# Patient Record
Sex: MALE | Race: WHITE | NOT HISPANIC OR LATINO | Employment: OTHER | URBAN - NONMETROPOLITAN AREA
[De-identification: names, ages, dates, MRNs, and addresses within clinical notes are randomized per-mention and may not be internally consistent; named-entity substitution may affect disease eponyms.]

---

## 2017-01-04 DIAGNOSIS — R69 DIAGNOSIS UNKNOWN: Primary | ICD-10-CM

## 2017-01-23 DIAGNOSIS — I10 ESSENTIAL HYPERTENSION WITH GOAL BLOOD PRESSURE LESS THAN 140/90: Primary | ICD-10-CM

## 2017-01-25 RX ORDER — LISINOPRIL AND HYDROCHLOROTHIAZIDE 20; 25 MG/1; MG/1
TABLET ORAL
Qty: 90 TABLET | Refills: 2 | Status: SHIPPED | OUTPATIENT
Start: 2017-01-25 | End: 2017-09-11

## 2017-01-25 NOTE — TELEPHONE ENCOUNTER
LISINOPRIL/HCTZ TABS 20/25MG  lisinopril-hydrochlorothiazide (PRINZIDE/ZESTORETIC) 20-25 MG per tablet  Last Written Prescription Date: 10/21/2016  Last Fill Quantity: 90, # refills: 0  Last Office Visit with [facility], [facility] or [facility] prescribing provider: 09/15/2016       POTASSIUM   Date Value Ref Range Status   09/15/2016 3.7 3.4 - 5.3 mmol/L Final     CREATININE   Date Value Ref Range Status   09/15/2016 0.97 0.66 - 1.25 mg/dL Final     BP Readings from Last 3 Encounters:   09/21/16 110/72   09/15/16 118/72   12/08/15 120/71

## 2017-03-26 DIAGNOSIS — F32.A DEPRESSION: ICD-10-CM

## 2017-03-27 RX ORDER — DULOXETIN HYDROCHLORIDE 60 MG/1
CAPSULE, DELAYED RELEASE ORAL
Qty: 90 CAPSULE | Refills: 0 | Status: SHIPPED | OUTPATIENT
Start: 2017-03-27 | End: 2017-06-01

## 2017-04-27 DIAGNOSIS — F31.11 BIPOLAR I DISORDER, MOST RECENT EPISODE (OR CURRENT) MANIC, MILD (H): ICD-10-CM

## 2017-04-28 RX ORDER — LITHIUM CARBONATE 300 MG/1
CAPSULE ORAL
Qty: 180 CAPSULE | Refills: 0 | Status: SHIPPED | OUTPATIENT
Start: 2017-04-28 | End: 2017-07-03

## 2017-06-01 DIAGNOSIS — F32.A DEPRESSION: ICD-10-CM

## 2017-06-01 DIAGNOSIS — F43.21 ADJUSTMENT DISORDER WITH DEPRESSED MOOD: Primary | ICD-10-CM

## 2017-06-05 RX ORDER — DULOXETIN HYDROCHLORIDE 60 MG/1
CAPSULE, DELAYED RELEASE ORAL
Qty: 90 CAPSULE | Refills: 0 | Status: SHIPPED | OUTPATIENT
Start: 2017-06-05 | End: 2017-09-03

## 2017-06-05 NOTE — TELEPHONE ENCOUNTER
Last visit 9/15/16.  Last PHQ 9/15/16 score of 2.  Due for depression follow up visit.  Cymbalta last signed 3/27/17 #90, 0 R.  Medication pended.  Please advise.  Thanks

## 2017-07-03 DIAGNOSIS — F31.11 BIPOLAR I DISORDER, MOST RECENT EPISODE (OR CURRENT) MANIC, MILD (H): ICD-10-CM

## 2017-07-05 RX ORDER — LITHIUM CARBONATE 300 MG/1
CAPSULE ORAL
Qty: 180 CAPSULE | Refills: 0 | Status: SHIPPED | OUTPATIENT
Start: 2017-07-05 | End: 2017-09-11

## 2017-07-17 ENCOUNTER — TELEPHONE (OUTPATIENT)
Dept: FAMILY MEDICINE | Facility: OTHER | Age: 67
End: 2017-07-17

## 2017-07-17 NOTE — TELEPHONE ENCOUNTER
4:40 PM    Reason for Call: OVERBOOK    Patient is having the following symptoms: persistant cough for 1 months.    The patient is requesting an appointment for sooner than next available with Dr Segovia.    Was an appointment offered for this call? Yes    Preferred method for responding to this message: Telephone Call [phone]    If we cannot reach you directly, may we leave a detailed response at the number you provided? Yes    Can this message wait until your PCP/provider returns, if unavailable today? YES    Priyanka Graham

## 2017-07-19 ENCOUNTER — OFFICE VISIT (OUTPATIENT)
Dept: FAMILY MEDICINE | Facility: OTHER | Age: 67
End: 2017-07-19
Attending: FAMILY MEDICINE
Payer: MEDICARE

## 2017-07-19 VITALS
WEIGHT: 239 LBS | DIASTOLIC BLOOD PRESSURE: 78 MMHG | BODY MASS INDEX: 38.41 KG/M2 | HEART RATE: 64 BPM | HEIGHT: 66 IN | SYSTOLIC BLOOD PRESSURE: 118 MMHG | TEMPERATURE: 97.2 F

## 2017-07-19 DIAGNOSIS — R05.9 COUGH: ICD-10-CM

## 2017-07-19 DIAGNOSIS — F30.9 BIPOLAR I DISORDER, SINGLE MANIC EPISODE (H): Primary | ICD-10-CM

## 2017-07-19 DIAGNOSIS — J98.01 ACUTE BRONCHOSPASM: ICD-10-CM

## 2017-07-19 DIAGNOSIS — R60.9 FLUID RETENTION: ICD-10-CM

## 2017-07-19 DIAGNOSIS — R79.89 ELEVATED LFTS: ICD-10-CM

## 2017-07-19 DIAGNOSIS — R63.5 WEIGHT GAIN: ICD-10-CM

## 2017-07-19 LAB
ALBUMIN SERPL-MCNC: 3.6 G/DL (ref 3.4–5)
ALP SERPL-CCNC: 62 U/L (ref 40–150)
ALT SERPL W P-5'-P-CCNC: 175 U/L (ref 0–70)
ANION GAP SERPL CALCULATED.3IONS-SCNC: 7 MMOL/L (ref 3–14)
AST SERPL W P-5'-P-CCNC: 57 U/L (ref 0–45)
BASOPHILS # BLD AUTO: 0 10E9/L (ref 0–0.2)
BASOPHILS NFR BLD AUTO: 0.6 %
BILIRUB SERPL-MCNC: 0.5 MG/DL (ref 0.2–1.3)
BUN SERPL-MCNC: 23 MG/DL (ref 7–30)
CALCIUM SERPL-MCNC: 9.2 MG/DL (ref 8.5–10.1)
CHLORIDE SERPL-SCNC: 104 MMOL/L (ref 94–109)
CO2 SERPL-SCNC: 29 MMOL/L (ref 20–32)
CREAT SERPL-MCNC: 1.05 MG/DL (ref 0.66–1.25)
DIFFERENTIAL METHOD BLD: NORMAL
EOSINOPHIL # BLD AUTO: 0.4 10E9/L (ref 0–0.7)
EOSINOPHIL NFR BLD AUTO: 5.6 %
ERYTHROCYTE [DISTWIDTH] IN BLOOD BY AUTOMATED COUNT: 13.6 % (ref 10–15)
GFR SERPL CREATININE-BSD FRML MDRD: 70 ML/MIN/1.7M2
GLUCOSE SERPL-MCNC: 109 MG/DL (ref 70–99)
HCT VFR BLD AUTO: 45.8 % (ref 40–53)
HGB BLD-MCNC: 15.3 G/DL (ref 13.3–17.7)
IMM GRANULOCYTES # BLD: 0 10E9/L (ref 0–0.4)
IMM GRANULOCYTES NFR BLD: 0.3 %
LYMPHOCYTES # BLD AUTO: 1.9 10E9/L (ref 0.8–5.3)
LYMPHOCYTES NFR BLD AUTO: 27.5 %
MCH RBC QN AUTO: 31.6 PG (ref 26.5–33)
MCHC RBC AUTO-ENTMCNC: 33.4 G/DL (ref 31.5–36.5)
MCV RBC AUTO: 95 FL (ref 78–100)
MONOCYTES # BLD AUTO: 0.7 10E9/L (ref 0–1.3)
MONOCYTES NFR BLD AUTO: 9.5 %
NEUTROPHILS # BLD AUTO: 3.9 10E9/L (ref 1.6–8.3)
NEUTROPHILS NFR BLD AUTO: 56.5 %
NRBC # BLD AUTO: 0 10*3/UL
NRBC BLD AUTO-RTO: 0 /100
NT-PROBNP SERPL-MCNC: 11 PG/ML (ref 0–125)
PLATELET # BLD AUTO: 242 10E9/L (ref 150–450)
POTASSIUM SERPL-SCNC: 4.4 MMOL/L (ref 3.4–5.3)
PROT SERPL-MCNC: 7.5 G/DL (ref 6.8–8.8)
RBC # BLD AUTO: 4.84 10E12/L (ref 4.4–5.9)
SODIUM SERPL-SCNC: 140 MMOL/L (ref 133–144)
TSH SERPL DL<=0.05 MIU/L-ACNC: 2.58 MU/L (ref 0.4–4)
WBC # BLD AUTO: 6.8 10E9/L (ref 4–11)

## 2017-07-19 PROCEDURE — 99214 OFFICE O/P EST MOD 30 MIN: CPT | Performed by: FAMILY MEDICINE

## 2017-07-19 PROCEDURE — 71020 ZZHC CHEST TWO VIEWS, FRONT/LAT: CPT | Mod: TC

## 2017-07-19 PROCEDURE — 85025 COMPLETE CBC W/AUTO DIFF WBC: CPT | Mod: ZL | Performed by: FAMILY MEDICINE

## 2017-07-19 PROCEDURE — 86704 HEP B CORE ANTIBODY TOTAL: CPT | Mod: ZL | Performed by: FAMILY MEDICINE

## 2017-07-19 PROCEDURE — 83880 ASSAY OF NATRIURETIC PEPTIDE: CPT | Mod: ZL | Performed by: FAMILY MEDICINE

## 2017-07-19 PROCEDURE — 86709 HEPATITIS A IGM ANTIBODY: CPT | Mod: ZL | Performed by: FAMILY MEDICINE

## 2017-07-19 PROCEDURE — 99212 OFFICE O/P EST SF 10 MIN: CPT

## 2017-07-19 PROCEDURE — 80053 COMPREHEN METABOLIC PANEL: CPT | Mod: ZL | Performed by: FAMILY MEDICINE

## 2017-07-19 PROCEDURE — 84443 ASSAY THYROID STIM HORMONE: CPT | Mod: ZL | Performed by: FAMILY MEDICINE

## 2017-07-19 PROCEDURE — G0472 HEP C SCREEN HIGH RISK/OTHER: HCPCS | Mod: ZL | Performed by: FAMILY MEDICINE

## 2017-07-19 PROCEDURE — 36415 COLL VENOUS BLD VENIPUNCTURE: CPT | Mod: ZL | Performed by: FAMILY MEDICINE

## 2017-07-19 RX ORDER — METHYLPREDNISOLONE 4 MG
TABLET, DOSE PACK ORAL
Qty: 21 TABLET | Refills: 0 | Status: SHIPPED | OUTPATIENT
Start: 2017-07-19 | End: 2017-09-11

## 2017-07-19 ASSESSMENT — PAIN SCALES - GENERAL: PAINLEVEL: NO PAIN (0)

## 2017-07-19 ASSESSMENT — ANXIETY QUESTIONNAIRES
3. WORRYING TOO MUCH ABOUT DIFFERENT THINGS: NOT AT ALL
1. FEELING NERVOUS, ANXIOUS, OR ON EDGE: NOT AT ALL
5. BEING SO RESTLESS THAT IT IS HARD TO SIT STILL: NOT AT ALL
GAD7 TOTAL SCORE: 0
7. FEELING AFRAID AS IF SOMETHING AWFUL MIGHT HAPPEN: NOT AT ALL
IF YOU CHECKED OFF ANY PROBLEMS ON THIS QUESTIONNAIRE, HOW DIFFICULT HAVE THESE PROBLEMS MADE IT FOR YOU TO DO YOUR WORK, TAKE CARE OF THINGS AT HOME, OR GET ALONG WITH OTHER PEOPLE: NOT DIFFICULT AT ALL
2. NOT BEING ABLE TO STOP OR CONTROL WORRYING: NOT AT ALL
6. BECOMING EASILY ANNOYED OR IRRITABLE: NOT AT ALL
4. TROUBLE RELAXING: NOT AT ALL

## 2017-07-19 NOTE — PATIENT INSTRUCTIONS
Will call with labs and US results    Call me in 2 weeks if cough not resolved    Watch diet - need to loose wt.

## 2017-07-19 NOTE — MR AVS SNAPSHOT
After Visit Summary   7/19/2017    Umer Archuleta    MRN: [id number]           Patient Information     Date Of Birth          1950        Visit Information        Provider Department      7/19/2017 8:45 AM Glenn Segovia MD [facility] [place]        Today's Diagnoses     Bipolar I disorder, single manic episode (H)    -  1    Weight gain        Fluid retention        Cough        Acute bronchospasm         Elevated LFTs          Care Instructions    Will call with labs and US results    Call me in 2 weeks if cough not resolved    Watch diet - need to loose wt.            Follow-ups after your visit        Your next 10 appointments already scheduled     Sep 11, 2017 10:00 AM CDT   (Arrive by 9:45 AM)   PHYSICAL with Glenn Segovia MD   [facility] [place] ([facility] )    [address]  [place] [address]   [phone]              Who to contact     If you have questions or need follow up information about today's clinic visit or your schedule please contact [facility] directly at [phone].  Normal or non-critical lab and imaging results will be communicated to you by RealDhart, letter or phone within 4 business days after the clinic has received the results. If you do not hear from us within 7 days, please contact the clinic through Yappt or phone. If you have a critical or abnormal lab result, we will notify you by phone as soon as possible.  Submit refill requests through LinkCloud or call your pharmacy and they will forward the refill request to us. Please allow 3 business days for your refill to be completed.          Additional Information About Your Visit        RealDharAppSurfer Information     LinkCloud gives you secure access to your electronic health record. If you see a primary care provider, you can also send messages to your care team and make appointments. If you have questions, please call your primary care clinic.  If you do not have a  "primary care provider, please call [phone] and they will assist you.        Care EveryWhere ID     This is your Care EveryWhere ID. This could be used by other organizations to access your [place] medical records  [id number]        Your Vitals Were     Pulse Temperature Height BMI (Body Mass Index)          64 97.2  F (36.2  C) 5' 5.5\" (1.664 m) 39.17 kg/m2         Blood Pressure from Last 3 Encounters:   07/19/17 118/78   09/21/16 110/72   09/15/16 118/72    Weight from Last 3 Encounters:   07/19/17 239 lb (108.4 kg)   09/21/16 228 lb 3.2 oz (103.5 kg)   09/15/16 224 lb (101.6 kg)              We Performed the Following     CBC with platelets differential     Comprehensive metabolic panel     Hepatitis A antibody IgM     Hepatitis B core antibody     Hepatitis C antibody     N terminal pro BNP outpatient     TSH     US Abdomen Limited     XR Chest 2 Views          Today's Medication Changes          These changes are accurate as of: 7/19/17 10:25 AM.  If you have any questions, ask your nurse or doctor.               Start taking these medicines.        Dose/Directions    methylPREDNISolone 4 MG tablet   Commonly known as:  MEDROL DOSEPAK   Used for:  Cough   Started by:  Glenn Segovia MD        Follow package instructions   Quantity:  21 tablet   Refills:  0            Where to get your medicines      These medications were sent to [facility] [place] - [address]  [address] [place] [address]     Phone:  [phone]     methylPREDNISolone 4 MG tablet                Primary Care Provider Office Phone # Fax #    Glenn Segovia [phone]       [facility] [address] [place] [address]        Equal Access to Services     [facility] : Hadii olvin Norton, waaxda luqadaha, qaybta kaalmada kenneth, russel may. So [facility] [phone].    ATENCIÓN: Si habla español, tiene a avelar disposición servicios " shauna de asistencia lingüística. Moira gleason [phone].    We comply with applicable federal civil rights laws and [place] laws. We do not discriminate on the basis of race, color, national origin, age, disability sex, sexual orientation or gender identity.            Thank you!     Thank you for choosing [facility]  for your care. Our goal is always to provide you with excellent care. Hearing back from our patients is one way we can continue to improve our services. Please take a few minutes to complete the written survey that you may receive in the mail after your visit with us. Thank you!             Your Updated Medication List - Protect others around you: Learn how to safely use, store and throw away your medicines at www.disposemymeds.org.          This list is accurate as of: 7/19/17 10:25 AM.  Always use your most recent med list.                   Brand Name Dispense Instructions for use Diagnosis    aspirin EC 81 MG EC tablet      Take 81 mg by mouth daily        DULoxetine 60 MG EC capsule    CYMBALTA    90 capsule    TAKE 1 CAPSULE DAILY    Adjustment disorder with depressed mood       lisinopril-hydrochlorothiazide 20-25 MG per tablet    PRINZIDE/ZESTORETIC    90 tablet    TAKE 1 TABLET DAILY    Essential hypertension with goal blood pressure less than 140/90       lithium 300 MG capsule     180 capsule    TAKE 2 CAPSULES (600 MG) DAILY (NEED FOLLOW UP)    Bipolar I disorder, most recent episode (or current) manic, mild (H)       methylPREDNISolone 4 MG tablet    MEDROL DOSEPAK    21 tablet    Follow package instructions    Cough       omeprazole 20 MG CR capsule    priLOSEC    90 capsule    TAKE 1 CAPSULE DAILY BEFORE A MEAL    Diagnosis unknown       sildenafil 100 MG cap/tab    VIAGRA    18 tablet    Take 1 tablet (100 mg) by mouth daily as needed for erectile dysfunction    Erectile dysfunction       vitamin D3 5000 UNIT/ML Liqd      Take 1 drop by mouth    Bipolar I disorder,  single manic episode, unspecified, Depression, major, recurrent (H)

## 2017-07-19 NOTE — NURSING NOTE
"Chief Complaint   Patient presents with     Chronic Cough       Initial /78  Pulse 64  Temp 97.2  F (36.2  C)  Ht 5' 5.5\" (1.664 m)  Wt 239 lb (108.4 kg)  BMI 39.17 kg/m2 Estimated body mass index is 39.17 kg/(m^2) as calculated from the following:    Height as of this encounter: 5' 5.5\" (1.664 m).    Weight as of this encounter: 239 lb (108.4 kg).  Medication Reconciliation: complete     Estuardo Graham      "

## 2017-07-19 NOTE — PROGRESS NOTES
"  SUBJECTIVE:                                                    Umer Archuleta is a 66 year old male who presents to clinic today for the following health issues:      cough      Duration: chronic for years, but worse in last few weeks    Description (location/character/radiation): deep loud cough    Intensity:  moderate    Accompanying signs and symptoms: none    History (similar episodes/previous evaluation): None    Precipitating or alleviating factors: None    Therapies tried and outcome: zyrtec    Harsh cough and s/s of bronchospasms    See VIJI              Depression and Anxiety Follow-Up    Status since last visit: No change    Other associated symptoms:None    Complicating factors:     Significant life event: No     Current substance abuse: None    PHQ-9 SCORE 12/8/2015 9/15/2016 7/19/2017   Total Score - - -   Total Score 18 2 4     KACIE-7 SCORE 7/19/2017   Total Score 0         No flowsheet data found.    PHQ-9  English  PHQ-9   Any Language  GAD7        Problem list and histories reviewed & adjusted, as indicated.  Additional history: as documented        Reviewed and updated as needed this visit by clinical staffTobacco  Allergies  Meds  Med Hx  Surg Hx  Fam Hx  Soc Hx      Reviewed and updated as needed this visit by Provider         ROS:  C: NEGATIVE for fever, chills, change in weight  E/M: NEGATIVE for ear, mouth and throat problems  CV: NEGATIVE for chest pain, palpitations    C/o some wt gain /fluid retention at times  No new meds or change in meds  Increase appetite and poor eating habits  No new allergens  Taking antihist.     Rare etoh use  No tylenol use  No h/o high lipids       OBJECTIVE:                                                    /78  Pulse 64  Temp 97.2  F (36.2  C)  Ht 5' 5.5\" (1.664 m)  Wt 239 lb (108.4 kg)  BMI 39.17 kg/m2  Body mass index is 39.17 kg/(m^2).   GENERAL: healthy, alert, well nourished, well hydrated, no distress- harsh dry cough  HENT: ear " canals- normal; TMs- normal; Nose- normal; Mouth- no ulcers, no lesions  NECK: no tenderness, no adenopathy, no asymmetry, no masses, no stiffness; thyroid- normal to palpation  RESP: lungs clear to auscultation - no rales, no rhonchi, no wheezes  CV: regular rates and rhythm, normal S1 S2, no S3 or S4 and no murmur, no click or rub -  Psych- doing well. noraml affect but some eating habit issues       Results for orders placed or performed in visit on 07/19/17 (from the past 24 hour(s))   CBC with platelets differential   Result Value Ref Range    WBC 6.8 4.0 - 11.0 10e9/L    RBC Count 4.84 4.4 - 5.9 10e12/L    Hemoglobin 15.3 13.3 - 17.7 g/dL    Hematocrit 45.8 40.0 - 53.0 %    MCV 95 78 - 100 fl    MCH 31.6 26.5 - 33.0 pg    MCHC 33.4 31.5 - 36.5 g/dL    RDW 13.6 10.0 - 15.0 %    Platelet Count 242 150 - 450 10e9/L    Diff Method Automated Method     % Neutrophils 56.5 %    % Lymphocytes 27.5 %    % Monocytes 9.5 %    % Eosinophils 5.6 %    % Basophils 0.6 %    % Immature Granulocytes 0.3 %    Nucleated RBCs 0 0 /100    Absolute Neutrophil 3.9 1.6 - 8.3 10e9/L    Absolute Lymphocytes 1.9 0.8 - 5.3 10e9/L    Absolute Monocytes 0.7 0.0 - 1.3 10e9/L    Absolute Eosinophils 0.4 0.0 - 0.7 10e9/L    Absolute Basophils 0.0 0.0 - 0.2 10e9/L    Abs Immature Granulocytes 0.0 0 - 0.4 10e9/L    Absolute Nucleated RBC 0.0    Comprehensive metabolic panel   Result Value Ref Range    Sodium 140 133 - 144 mmol/L    Potassium 4.4 3.4 - 5.3 mmol/L    Chloride 104 94 - 109 mmol/L    Carbon Dioxide 29 20 - 32 mmol/L    Anion Gap 7 3 - 14 mmol/L    Glucose 109 (H) 70 - 99 mg/dL    Urea Nitrogen 23 7 - 30 mg/dL    Creatinine 1.05 0.66 - 1.25 mg/dL    GFR Estimate 70 >60 mL/min/1.7m2    GFR Estimate If Black 85 >60 mL/min/1.7m2    Calcium 9.2 8.5 - 10.1 mg/dL    Bilirubin Total 0.5 0.2 - 1.3 mg/dL    Albumin 3.6 3.4 - 5.0 g/dL    Protein Total 7.5 6.8 - 8.8 g/dL    Alkaline Phosphatase 62 40 - 150 U/L     (H) 0 - 70 U/L    AST  57 (H) 0 - 45 U/L   TSH   Result Value Ref Range    TSH 2.58 0.40 - 4.00 mU/L     CXR ok/stable   ASSESSMENT/PLAN:                                                    (F30.9) Bipolar I disorder, single manic episode (H)  (primary encounter diagnosis)  Comment: overall stable - some heavier eating and night eating. Will need to watch.   Plan: CBC with platelets differential, Comprehensive         metabolic panel, TSH        Continue current medications and behavioral changes.     (R63.5) Weight gain  Comment: Discussed. May be reason for elevated LFT- has h/o fatty liver  Plan: CBC with platelets differential, Comprehensive         metabolic panel, TSH, XR Chest 2 Views, N         terminal pro BNP outpatient        Discussed behavioral changes to improve obesity including increase diet, decreasing carbs along with other changes in diet and consider seeing dietician or enroll in a watch watcher's type program.       (E87.70) Fluid retention  Comment: salt and fluid intake related- discussed. Labs ok - few labs pending  Plan: CBC with platelets differential, Comprehensive         metabolic panel, TSH, XR Chest 2 Views, N         terminal pro BNP outpatient            (R05) Cough  Comment: etiology unclear.  ??post viral Not GERD  ? Fluid retention related - doubt.  ACEI - doubt but possible   Plan: N terminal pro BNP outpatient,         methylPREDNISolone (MEDROL DOSEPAK) 4 MG tablet        Try steroids pack and if fails try advair type inhaler vs trial of off ACEI    (J98.01) Acute bronchospasm   Comment: as above BNP also pending   Plan: N terminal pro BNP outpatient        US done due to this and below. ?? diaphragm irritation     (R79.89) Elevated LFTs  Comment: No ETOH- probable fatty liver - discussed.   Check below labs and US .   Plan: Hepatitis C antibody, Hepatitis B core         antibody, Hepatitis A antibody IgM, US Abdomen         Limited            Will see for Px in sept.        See Patient  Instructions    Glenn Segovia MD  [facility]

## 2017-07-20 LAB
HAV IGM SERPL QL IA: NORMAL
HBV CORE AB SERPL QL IA: NONREACTIVE
HCV AB SERPL QL IA: NORMAL

## 2017-07-20 ASSESSMENT — PATIENT HEALTH QUESTIONNAIRE - PHQ9: SUM OF ALL RESPONSES TO PHQ QUESTIONS 1-9: 4

## 2017-07-20 ASSESSMENT — ANXIETY QUESTIONNAIRES: GAD7 TOTAL SCORE: 0

## 2017-07-24 ENCOUNTER — HOSPITAL ENCOUNTER (OUTPATIENT)
Dept: ULTRASOUND IMAGING | Facility: HOSPITAL | Age: 67
Discharge: HOME | End: 2017-07-24
Attending: FAMILY MEDICINE | Admitting: FAMILY MEDICINE
Payer: MEDICARE

## 2017-07-24 PROCEDURE — 76705 ECHO EXAM OF ABDOMEN: CPT | Mod: TC

## 2017-07-27 ENCOUNTER — TELEPHONE (OUTPATIENT)
Dept: FAMILY MEDICINE | Facility: OTHER | Age: 67
End: 2017-07-27

## 2017-07-27 NOTE — TELEPHONE ENCOUNTER
8:44 AM    Reason for Call: Phone Call    Description: PT called to receive the results from an Ultra sound performed on Monday 07/24/2017.  Nurse please advise.    Was an appointment offered for this call? No    Preferred method for responding to this message: Telephone Call: [phone]    If we cannot reach you directly, may we leave a detailed response at the number you provided? Yes    Can this message wait until your PCP/provider returns, if available today? Not applicable, PCP is in today     Lorraine Bach

## 2017-09-03 DIAGNOSIS — F43.21 ADJUSTMENT DISORDER WITH DEPRESSED MOOD: ICD-10-CM

## 2017-09-05 NOTE — TELEPHONE ENCOUNTER
Cymbalta    Last Written Prescription Date: 6/5/17  Last Fill Quantity: 90, # refills: 0  Last Office Visit with [facility], [facility] or  Health prescribing provider: 7/19/17   Next 5 appointments (look out 90 days)     Sep 11, 2017 10:00 AM CDT   (Arrive by 9:45 AM)   PHYSICAL with Glenn Segovia MD   [facility] [place] ([facility] )    [address] [place]  [place] [address]   [phone]                   BP Readings from Last 3 Encounters:   07/19/17 118/78   09/21/16 110/72   09/15/16 118/72     Pulse: (for Fetzima)  Creatinine   Date Value Ref Range Status   07/19/2017 1.05 0.66 - 1.25 mg/dL Final   ]    Last PHQ-9 score on record=   PHQ-9 SCORE 7/19/2017   Total Score -   Total Score 4

## 2017-09-06 RX ORDER — DULOXETIN HYDROCHLORIDE 60 MG/1
CAPSULE, DELAYED RELEASE ORAL
Qty: 90 CAPSULE | Refills: 3 | Status: SHIPPED | OUTPATIENT
Start: 2017-09-06 | End: 2018-09-02

## 2017-09-06 NOTE — TELEPHONE ENCOUNTER
Cymbalta  7/19/17 labs high:  AST 57    Last office note stated see patient in Sept.  Pharmacy requesting 3 month supply.  Medication pended.  Please advise.  Thank you.

## 2017-09-11 ENCOUNTER — OFFICE VISIT (OUTPATIENT)
Dept: FAMILY MEDICINE | Facility: OTHER | Age: 67
End: 2017-09-11
Attending: FAMILY MEDICINE
Payer: MEDICARE

## 2017-09-11 VITALS
BODY MASS INDEX: 35.84 KG/M2 | OXYGEN SATURATION: 94 % | HEIGHT: 69 IN | SYSTOLIC BLOOD PRESSURE: 108 MMHG | WEIGHT: 242 LBS | TEMPERATURE: 97.2 F | HEART RATE: 67 BPM | DIASTOLIC BLOOD PRESSURE: 72 MMHG

## 2017-09-11 DIAGNOSIS — F31.11 BIPOLAR I DISORDER, MOST RECENT EPISODE (OR CURRENT) MANIC, MILD (H): ICD-10-CM

## 2017-09-11 DIAGNOSIS — F33.42 RECURRENT MAJOR DEPRESSIVE DISORDER, IN FULL REMISSION (H): ICD-10-CM

## 2017-09-11 DIAGNOSIS — F30.9 BIPOLAR I DISORDER, SINGLE MANIC EPISODE (H): ICD-10-CM

## 2017-09-11 DIAGNOSIS — I10 ESSENTIAL HYPERTENSION WITH GOAL BLOOD PRESSURE LESS THAN 140/90: ICD-10-CM

## 2017-09-11 DIAGNOSIS — Z00.00 ROUTINE GENERAL MEDICAL EXAMINATION AT A HEALTH CARE FACILITY: Primary | ICD-10-CM

## 2017-09-11 DIAGNOSIS — E66.01 MORBID OBESITY DUE TO EXCESS CALORIES (H): ICD-10-CM

## 2017-09-11 DIAGNOSIS — R35.1 NOCTURIA: ICD-10-CM

## 2017-09-11 DIAGNOSIS — R63.5 WEIGHT GAIN: ICD-10-CM

## 2017-09-11 LAB
LITHIUM SERPL-SCNC: 0.5 MMOL/L (ref 0.6–1.2)
PSA SERPL-MCNC: 0.64 UG/L (ref 0–4)

## 2017-09-11 PROCEDURE — 99213 OFFICE O/P EST LOW 20 MIN: CPT

## 2017-09-11 PROCEDURE — 80178 ASSAY OF LITHIUM: CPT | Mod: ZL | Performed by: FAMILY MEDICINE

## 2017-09-11 PROCEDURE — 84153 ASSAY OF PSA TOTAL: CPT | Mod: ZL | Performed by: FAMILY MEDICINE

## 2017-09-11 PROCEDURE — 99397 PER PM REEVAL EST PAT 65+ YR: CPT | Performed by: FAMILY MEDICINE

## 2017-09-11 PROCEDURE — 36415 COLL VENOUS BLD VENIPUNCTURE: CPT | Mod: ZL | Performed by: FAMILY MEDICINE

## 2017-09-11 RX ORDER — LISINOPRIL AND HYDROCHLOROTHIAZIDE 20; 25 MG/1; MG/1
1 TABLET ORAL DAILY
Qty: 90 TABLET | Refills: 3 | Status: SHIPPED | OUTPATIENT
Start: 2017-09-11 | End: 2018-09-09

## 2017-09-11 RX ORDER — LITHIUM CARBONATE 300 MG/1
CAPSULE ORAL
Qty: 180 CAPSULE | Refills: 3 | Status: SHIPPED | OUTPATIENT
Start: 2017-09-11 | End: 2018-09-24

## 2017-09-11 ASSESSMENT — ANXIETY QUESTIONNAIRES
1. FEELING NERVOUS, ANXIOUS, OR ON EDGE: NOT AT ALL
GAD7 TOTAL SCORE: 0
3. WORRYING TOO MUCH ABOUT DIFFERENT THINGS: NOT AT ALL
5. BEING SO RESTLESS THAT IT IS HARD TO SIT STILL: NOT AT ALL
6. BECOMING EASILY ANNOYED OR IRRITABLE: NOT AT ALL
7. FEELING AFRAID AS IF SOMETHING AWFUL MIGHT HAPPEN: NOT AT ALL
4. TROUBLE RELAXING: NOT AT ALL
2. NOT BEING ABLE TO STOP OR CONTROL WORRYING: NOT AT ALL

## 2017-09-11 ASSESSMENT — PATIENT HEALTH QUESTIONNAIRE - PHQ9: SUM OF ALL RESPONSES TO PHQ QUESTIONS 1-9: 5

## 2017-09-11 ASSESSMENT — PAIN SCALES - GENERAL: PAINLEVEL: NO PAIN (0)

## 2017-09-11 NOTE — PROGRESS NOTES
SUBJECTIVE:   CC: Umer Archuleta is an 67 year old male who presents for preventative health visit.     Healthy Habits:    Do you get at least three servings of calcium containing foods daily (dairy, green leafy vegetables, etc.)? No    Amount of exercise or daily activities, outside of work: 5-6 day(s) per week - walk dogs, but not very far    Problems taking medications regularly No    Medication side effects: No    Have you had an eye exam in the past two years? yes    Do you see a dentist twice per year? no    Do you have sleep apnea, excessive snoring or daytime drowsiness?Sleep apnea - mask at night          Hypertension Follow-up      Outpatient blood pressures are not being checked.    Low Salt Diet: no added salt    Depression Followup    Status since last visit: Stable  On meds     See PHQ-9 for current symptoms.  Other associated symptoms: None    Complicating factors:   Significant life event:  No   Current substance abuse:  None  Anxiety or Panic symptoms:  No      Wt is still going up and eating poorly and up part of the night eating and on computer.       Not sleep well 5/7 nights - eats and on ipad.  On CPAP  No pain or anything waking him      Lots on the ipad during day  Not much exercise    No change in meds       PHQ-9  English  PHQ-9   Any Language          Today's PHQ-2 Score:   PHQ-2 ( 1999 [employer]) 8/14/2013 3/21/2013   Q1: Little interest or pleasure in doing things 0 1   Q2: Feeling down, depressed or hopeless 0 0   PHQ-2 Score 0 1     PHQ-9 SCORE 9/15/2016 7/19/2017 9/11/2017   Total Score - - -   Total Score 2 4 5     KACIE-7 SCORE 7/19/2017 9/11/2017   Total Score 0 0         Abuse: Current or Past(Physical, Sexual or Emotional)- No  Do you feel safe in your environment - Yes    Social History   Substance Use Topics     Smoking status: Former Smoker     Packs/day: 0.50     Years: 10.00     Types: Cigarettes     Smokeless tobacco: Never Used      Comment: Tried to Quit (YES)     Alcohol  "use 0.0 oz/week      Comment: rarely     The patient does not drink >3 drinks per day nor >7 drinks per week.    Last PSA:   PSA   Date Value Ref Range Status   09/15/2016 0.86 0 - 4 ug/L Final     Comment:     Assay Method:  Chemiluminescence using Siemens Vista analyzer       Reviewed orders with patient. Reviewed health maintenance and updated orders accordingly - Yes      Reviewed and updated as needed this visit by clinical staff  Tobacco  Allergies  Meds  Problems  Med Hx  Surg Hx  Fam Hx  Soc Hx          Reviewed and updated as needed this visit by Provider        Past Medical History:   Diagnosis Date     Bipolar II Disorder 10/23/2012     Depression, major, recurrent (H)      Depression, major, recurrent (H)      ED (erectile dysfunction)      Fine Tremor 10/23/2012     H/o manic episode 10/23/2012     Hypertension 10/23/2012     Sleep disorder 11/23/2012     Tobacco abuse, in remission       Past Surgical History:   Procedure Laterality Date     COLONOSCOPY  8/28/2013    repeat in 2023//Procedure: COLONOSCOPY;  COLONOSCOPY;  Surgeon: Agustin Llamas MD;  Location: [facility]     TONSILLECTOMY         ROS:  C: NEGATIVE for fever, chills, change in weight  I: NEGATIVE for worrisome rashes, moles or lesions  E: NEGATIVE for vision changes or irritation  ENT: NEGATIVE for ear, mouth and throat problems  R: NEGATIVE for significant cough or SOB  CV: NEGATIVE for chest pain, palpitations or peripheral edema  GI: NEGATIVE for nausea, abdominal pain, heartburn, or change in bowel habits   male: negative for dysuria, hematuria, decreased urinary stream, erectile dysfunction, urethral discharge  M: NEGATIVE for significant arthralgias or myalgia  N: NEGATIVE for weakness, dizziness or paresthesias  P: NEGATIVE for changes in mood or affect    OBJECTIVE:   /72 (BP Location: Left arm, Patient Position: Chair, Cuff Size: Adult Regular)  Pulse 67  Temp 97.2  F (36.2  C) (Tympanic)  Ht 5' 8.5\" (1.74 m)  " Wt 242 lb (109.8 kg)  SpO2 94%  BMI 36.26 kg/m2  EXAM:  GENERAL: healthy, alert and no distress  EYES: Eyes grossly normal to inspection, PERRL and conjunctivae and sclerae normal  HENT: ear canals and TM's normal, nose and mouth without ulcers or lesions  NECK: no adenopathy, no asymmetry, masses, or scars and thyroid normal to palpation  RESP: lungs clear to auscultation - no rales, rhonchi or wheezes  CV: regular rate and rhythm, normal S1 S2, no S3 or S4, no murmur, click or rub, no peripheral edema and peripheral pulses strong  ABDOMEN: soft, nontender, no hepatosplenomegaly, no masses and bowel sounds normal small sliding umbilical hernia    (male): normal male genitalia without lesions or urethral discharge,  Small right hernia - very small   MS: no gross musculoskeletal defects noted, no edema  SKIN: no suspicious lesions or rashes  NEURO: Normal strength and tone, mentation intact and speech normal  PSYCH: mentation appears normal, affect normal/bright  Results for orders placed or performed in visit on 09/11/17 (from the past 24 hour(s))   PSA tumor marker   Result Value Ref Range    PSA 0.64 0 - 4 ug/L       ASSESSMENT/PLAN:   1. Routine general medical examination at a health care facility  Doing fair -needs to be more active and needs better habits-- long discussion on this     2. Bipolar I disorder, single manic episode (H)  Stable   - Lithium level; Future  - Lithium level    3. Recurrent major depressive disorder, in full remission (H)  Stable   - Lithium level; Future  - Lithium level    4. Weight gain  Long discussion on all bad habits.  Pt is to work on this     5. Essential hypertension with goal blood pressure less than 140/90  Stable on meds   - lisinopril-hydrochlorothiazide (PRINZIDE/ZESTORETIC) 20-25 MG per tablet; Take 1 tablet by mouth daily  Dispense: 90 tablet; Refill: 3    6. Nocturia  Mild - will watch   - PSA tumor marker; Future  - PSA tumor marker    7. Morbid obesity due to  "excess calories (H)  Long discussion     8. Bipolar I disorder, most recent episode (or current) manic, mild (H)  As above - no s/s of recurrence but does have some different thinking at times.   - lithium 300 MG capsule; TAKE 2 CAPSULES (600 MG) DAILY (NEED FOLLOW UP)  Dispense: 180 capsule; Refill: 3    COUNSELING:  Reviewed preventive health counseling, as reflected in patient instructions       Regular exercise       Healthy diet/nutrition       Prostate cancer screening           reports that he has quit smoking. His smoking use included Cigarettes. He has a 5.00 pack-year smoking history. He has never used smokeless tobacco.      Estimated body mass index is 36.26 kg/(m^2) as calculated from the following:    Height as of this encounter: 5' 8.5\" (1.74 m).    Weight as of this encounter: 242 lb (109.8 kg).   Weight management plan: Patient was referred to their PCP to discuss a diet and exercise plan.    Counseling Resources:  ATP IV Guidelines  Pooled Cohorts Equation Calculator  FRAX Risk Assessment  ICSI Preventive Guidelines  Dietary Guidelines for Americans, 2010  USDA's MyPlate  ASA Prophylaxis  Lung CA Screening    Glenn Segovia MD  [facility] [place]  "

## 2017-09-11 NOTE — NURSING NOTE
"Chief Complaint   Patient presents with     Physical       Initial /72 (BP Location: Left arm, Patient Position: Chair, Cuff Size: Adult Regular)  Pulse 67  Temp 97.2  F (36.2  C) (Tympanic)  Ht 5' 8.5\" (1.74 m)  Wt 242 lb (109.8 kg)  SpO2 94%  BMI 36.26 kg/m2 Estimated body mass index is 36.26 kg/(m^2) as calculated from the following:    Height as of this encounter: 5' 8.5\" (1.74 m).    Weight as of this encounter: 242 lb (109.8 kg).  Medication Reconciliation: complete     Maribell Vail   "

## 2017-09-11 NOTE — MR AVS SNAPSHOT
After Visit Summary   9/11/2017    Umer Archuleta    MRN: [id number]           Patient Information     Date Of Birth          1950        Visit Information        Provider Department      9/11/2017 10:00 AM Glenn Segovia MD [facility]        Today's Diagnoses     Routine general medical examination at a health care facility    -  1    Bipolar I disorder, single manic episode (H)        Recurrent major depressive disorder, in full remission (H)        Weight gain        Essential hypertension with goal blood pressure less than 140/90        Nocturia        Morbid obesity due to excess calories (H)        Bipolar I disorder, most recent episode (or current) manic, mild (H)          Care Instructions      Preventive Health Recommendations:   Male Ages 65 and over    Yearly exam:             See your health care provider every year in order to  o   Review health changes.   o   Discuss preventive care.    o   Review your medicines if your doctor has prescribed any.    Talk with your health care provider about whether you should have a test to screen for prostate cancer (PSA).    Every 3 years, have a diabetes test (fasting glucose). If you are at risk for diabetes, you should have this test more often.    Every 5 years, have a cholesterol test. Have this test more often if you are at risk for high cholesterol or heart disease.     Every 10 years, have a colonoscopy. Or, have a yearly FIT test (stool test). These exams will check for colon cancer.    Talk to with your health care provider about screening for Abdominal Aortic Aneurysm if you have a family history of AAA or have a history of smoking.    Shots:     Get a flu shot each year.     Get a tetanus shot every 10 years.     Talk to your doctor about your pneumonia vaccines. There are now two you should receive - Pneumovax (PPSV 23) and Prevnar (PCV 13).     Talk to your doctor about a shingles vaccine.     Talk to your doctor  about the hepatitis B vaccine.  Nutrition:     Eat at least 5 servings of fruits and vegetables each day.     Eat whole-grain bread, whole-wheat pasta and brown rice instead of white grains and rice.     Talk to your provider about Calcium and Vitamin D.   Lifestyle    Exercise for at least 150 minutes a week (30 minutes a day, 5 days a week). This will help you control your weight and prevent disease.     Limit alcohol to one drink per day.     No smoking.     Wear sunscreen to prevent skin cancer.     See your dentist every six months for an exam and cleaning.   See your eye doctor every 1 to 2 years to screen for conditions such as glaucoma, macular degeneration, cataracts, etc   Your Heart is at Risk    Plaque and blood clots in the coronary arteries reduce blood flow to the heart:  When a coronary artery narrows, less blood and oxygen flow to the heart muscle. The decreased blood flow can cause symptoms of angina. This is often felt as temporary pain or pressure in or near the chest. This can also feel like pain in the jaw, neck, and shoulder.  When a coronary artery narrows too much, very little blood and oxygen reach the heart muscle beyond the site of narrowing. If a clot forms, blood flow in the artery may stop. This can result in a heart attack. If the muscle goes without oxygen for too long, that part of the heart muscle dies.  Your whole body is at risk  Plaque buildup can lead to problems throughout the body. Common sites of artery problems include:  Brain. Arteries in the brain or leading to the brain can become blocked. When this happens, blood flow to that part of the brain is reduced and that part of the brain can t get the oxygen it needs. That portion of the brain is damaged. This is a stroke.  Kidneys. If an artery that carries blood to the kidneys is narrowed, the kidneys have a hard time filtering blood. This can lead to kidney damage.  Aorta. This is the body s main artery. It connects  directly to the heart. If this artery is damaged, the affected section can weaken and balloon out. This is called an aortic aneurysm.  Legs. If arteries in the legs are clogged with plaque, cramping, or aching in the buttocks, thighs, or calves can occur when walking. This is called claudication.  Date Last Reviewed: 6/1/2016 2000-2017 [employer]. [address] [place] [address], [place] [address]. All rights reserved. This information is not intended as a substitute for professional medical care. Always follow your healthcare professional's instructions.        Weight Management: Take It Off and Keep It Off    It s easy to be motivated when you first start. The key is to stay motivated all along the way and to have realistic and achievable goals. There are things you can do to keep yourself on the path to success.  Don t focus on daily weight gains and losses. Instead, weigh yourself no more than once a week at the same time of day. Weighing yourself each day will probably only frustrate you.    Stay motivated  Here are suggestions to keep you motivated:   Remind yourself of your goals. Post them near the refrigerator or desk where you work.  Make daily entries in your diary or journal about your activity and eating. A visual reminder of success, like a gold star, can help keep you going.  Every week, take time to look back on how much you ve accomplished, and the changes you may have made.  Try taking a nutrition class. It can help you learn new shopping, cooking, and eating skills, and also meet new people. You might try a low-fat cooking or yoga class.  Don t be hard on yourself or give up if you slip. Be patient. Learn from your mistakes and adjust your plan if you need to. Then get right back to it.  Be realistic about your goals. Talk with a dietitian or your healthcare provider about what goals are reasonable for you.   Believe that you can do it  How you think about yourself is just as important as  what you do. If you don t think you can succeed, chances are you won t. Believe that you can stick to your plan and meet your goals:  If you don t meet a goal, don t use it as an excuse to give up on your whole plan. Adjust your goal and try again.  Try to understand your own attitude about food.  Are you subject to emotional eating?  Learn how to accept compliments. Even if you get embarrassed, just say  thank you.   Make a list of the things that others like about you and that you like about yourself. Add something new from time to time. Keep this list to look at when you need a lift.  Resources  The President s [place] on Fitness, Sports & Nutritionwww.fitness.gov  Academy of Nutrition and Dieteticswww.eatright.org  Healthfinderwww.healthfinder.gov  Date Last Reviewed: 2/4/2016 2000-2017 [employer]. [address] [place] [address]. All rights reserved. This information is not intended as a substitute for professional medical care. Always follow your healthcare professional's instructions.        Weight Management: Overcoming Your Barriers    You may have many reasons why you re not ready to lose weight. You may not feel you have the time or the skills. You may be afraid of losing weight and gaining it back again. Well, you can lose weight. And you can keep the weight off, if you make changes slowly and stick with them. Remember that you may never find the perfect time to lose weight. Decide that the right time to be healthier is now.  Common barriers  Barrier 1: I don t want to deny myself.  Barrier Buster: You don t have to! Moderation is the key:  Watch portion sizes and know when you're eating more than one serving.  Plan to ask for a doggy bag when you eat out.  Have just one.  Choose lower-fat and lower-calorie versions of your favorites.  Use a small plate instead of a normal-sized plate.   Barrier 2: I lost weight before but I gained it right back.  Barrier Buster: Make this  time different:  List what worked and didn t work last time and what you can try this time.  Choose changes that you are willing to stick with.  Work exercise into your weight-loss plan.  Be realistic about what is possible. Your plan has to fit into your life in a balanced way that works for you.   Barrier 3: I don t have the time to be active.  Barrier Buster: It takes just a few minutes a day!  Be active with a pet or the kids.  Block off activity time in your schedule.  Borrow some time that you usually spend watching TV.  You are too important not to take time to exercise--it is your life!   Feel good about yourself  Do you eat more because you feel bad about yourself, then feel even worse as you gain weight? This is a  vicious cycle.  Breaking this cycle is not easy. You may need group support or counseling. Always remember that you are a valuable person, no matter what size or shape you are.  Do you have a health problem? If so, don t use it as an excuse for not losing weight. Ask your healthcare provider or dietitian about methods to lose weight that are safe for you. For example, even if you have severe arthritis, it may be easier for you to exercise in a pool. Get advice from a .    Date Last Reviewed: 2/2/2016 2000-2017 [employer]. [address] [place] [address], [place] [address]. All rights reserved. This information is not intended as a substitute for professional medical care. Always follow your healthcare professional's instructions.        Weight Management: Healthy Eating  Food is your body s fuel. You can t live without it. The key is to give your body enough nutrients and energy without eating too much. Reading food labels can help you make healthy choices. Also, learn new eating habits to manage your weight.     All the values on the label are based on one serving. The serving size is the average portion. Remember to multiply the values on the label by the number of  servings you eat.   Eat less fat  A gram of fat has almost 2.5 times the calories of a gram of protein or carbohydrates. Try to balance your food choices so that only 20% to 35% of your calories comes from total fat. This means an average of 2  to 3  grams of fat for each 100 calories you eat.  Eat more fiber  High-fiber foods are digested more slowly than low-fiber foods, so you feel full longer. Try to get at least 25 grams of fiber each day for a 2000 calorie diet. Foods high in fiber include:  Vegetables and fruits  Whole-grain or bran breads, pastas, and cereals  Legumes (beans) and peas  As you begin to eat more fiber, be sure to drink plenty of water to keep your digestive system working smoothly.  Tips  Do's and don'ts include:   Don t skip meals. This often leads to overeating later on. It s best to spread your eating throughout the day.  Eat a variety of foods, not just a few favorites.  If you find yourself eating when you re not hungry, ask yourself why. Many of us eat when we re bored, stressed, or just to be polite. Listen to your body. If you re not hungry, get busy doing something else instead of eating.  Eat slower, shooting for 20 to 30 minutes for each meal. It takes 20 minutes for your stomach to tell your brain that it s full. Slow eaters tend to eat less and are still satisfied, while fast eaters may tend to be overeaters.   Pay attention to what you eat. Don t read or watch TV during your meal.  Date Last Reviewed: 1/31/2016 2000-2017 [employer]. [address] [place] [address], [place] [address]. All rights reserved. This information is not intended as a substitute for professional medical care. Always follow your healthcare professional's instructions.        Weight Management: Fact and Fiction    Knowing the truth about losing weight can help you separate what works from what doesn t. Don t be taken in by expensive weight-loss fads like pills, herbs, and special foods that promise  unbelievable results. There s no magic way to lose weight. If you have questions about weight loss, ask your healthcare provider.  Fiction:  The faster I lose weight, the better.   Fact: Rapid weight loss is usually due to loss of water or muscle mass. What you re trying to get rid of is extra fat. Aim to lose a 1/2 pound to 2 pounds a week. Then you re more likely to lose fat rather than water or muscle.  Fiction:  Skipping meals will help me lose weight.   Fact: When you skip meals, you don t give your body the energy it needs to work. Hunger makes you more likely to overeat later on. It s best to spread your meals throughout the day. Eat at least three meals a day.  Fiction:  I can t start exercising until I lose weight.   Fact: The sooner you start exercising the better. Exercise helps burn more calories, tone your muscles, and keep your appetite in check. People who continue to exercise after they lose weight are more likely to keep the weight off.  Fiction:  The fewer calories I eat, the better.   Fact: This seems like it should be true, but it s not. When you eat too few calories, your body acts as if it s on a desert island. It thinks food is scarce, so it slows down your metabolism (how fast you burn calories) to save energy. By eating too few calories, you make it harder to lose weight.  Fiction:  Once I lose weight, I can go back to living the way I did before.   Fact: Going back to your old eating habits and giving up exercise is a sure way to regain any weight you ve lost. The lifestyle changes that help you lose extra weight can also help keep it off. This is why you need to make realistic changes you can stick with.  Fiction:  Low-fat and fat-free mean low-calorie.   Fact: All foods, even fat-free ones, have calories. Eat too many calories and you ll gain weight. It s OK to treat yourself to a fat-free cookie or two. Just don t eat the whole box! A dietitian will help you figure this out, and will  "likely recommend that you eat three meals a day, with protein with each meal.   Date Last Reviewed: 2/4/2016 2000-2017 [employer]. [address] [place] [address], [place] [address]. All rights reserved. This information is not intended as a substitute for professional medical care. Always follow your healthcare professional's instructions.                  Follow-ups after your visit        Who to contact     If you have questions or need follow up information about today's clinic visit or your schedule please contact [facility] directly at [phone].  Normal or non-critical lab and imaging results will be communicated to you by I and love and youhart, letter or phone within 4 business days after the clinic has received the results. If you do not hear from us within 7 days, please contact the clinic through [employer] or phone. If you have a critical or abnormal lab result, we will notify you by phone as soon as possible.  Submit refill requests through [employer] or call your pharmacy and they will forward the refill request to us. Please allow 3 business days for your refill to be completed.          Additional Information About Your Visit        MyChart Information     [employer] gives you secure access to your electronic health record. If you see a primary care provider, you can also send messages to your care team and make appointments. If you have questions, please call your primary care clinic.  If you do not have a primary care provider, please call [phone] and they will assist you.        Care EveryWhere ID     This is your Care EveryWhere ID. This could be used by other organizations to access your [place] medical records  [id number]        Your Vitals Were     Pulse Temperature Height Pulse Oximetry BMI (Body Mass Index)       67 97.2  F (36.2  C) (Tympanic) 5' 8.5\" (1.74 m) 94% 36.26 kg/m2        Blood Pressure from Last 3 Encounters:   09/11/17 108/72   07/19/17 118/78   09/21/16 110/72    " Weight from Last 3 Encounters:   09/11/17 242 lb (109.8 kg)   07/19/17 239 lb (108.4 kg)   09/21/16 228 lb 3.2 oz (103.5 kg)              We Performed the Following     Lithium level     OFFICE/OUTPT VISIT,EST,LEVL III     PSA tumor marker          Today's Medication Changes          These changes are accurate as of: 9/11/17 12:26 PM.  If you have any questions, ask your nurse or doctor.               These medicines have changed or have updated prescriptions.        Dose/Directions    lisinopril-hydrochlorothiazide 20-25 MG per tablet   Commonly known as:  PRINZIDE/ZESTORETIC   This may have changed:  See the new instructions.   Used for:  Essential hypertension with goal blood pressure less than 140/90   Changed by:  Glenn Segovia MD        Dose:  1 tablet   Take 1 tablet by mouth daily   Quantity:  90 tablet   Refills:  3       lithium 300 MG capsule   This may have changed:  See the new instructions.   Used for:  Bipolar I disorder, most recent episode (or current) manic, mild (H)   Changed by:  Glenn Segovia MD        TAKE 2 CAPSULES (600 MG) DAILY (NEED FOLLOW UP)   Quantity:  180 capsule   Refills:  3         Stop taking these medicines if you haven't already. Please contact your care team if you have questions.     methylPREDNISolone 4 MG tablet   Commonly known as:  MEDROL DOSEPAK   Stopped by:  Glenn Segovia MD                Where to get your medicines      These medications were sent to [facility] - [address] [address]     Phone:  [phone]     lisinopril-hydrochlorothiazide 20-25 MG per tablet    lithium 300 MG capsule                Primary Care Provider Office Phone # Fax #    Glenn Segovia [phone]       [facility] [address] [facility] [address]        Equal Access to Services     FAY HOLMAN AH: nahomy Acevedo, ty cooper,  russel bloodbarry ceja'aan ah. So [facility] [phone].    ATENCIÓN: Si jamella bessy, tiene a avelar disposición servicios gratuitos de asistencia lingüística. Moira gleason [phone].    We comply with applicable federal civil rights laws and [place] laws. We do not discriminate on the basis of race, color, national origin, age, disability sex, sexual orientation or gender identity.            Thank you!     Thank you for choosing [facility]3] [facility]  [facility] your [facility]. Our goal is always to provide you with excellent care. Hearing back from our patients is one way we can continue to improve our services. Please take a few minutes to complete the written survey that you may receive in the mail after your visit with us. Thank you!             Your Updated Medication List - Protect others around you: Learn how to safely use, store and throw away your medicines at www.disposemymeds.org.          This list is accurate as of: 9/11/17 12:26 PM.  Always use your most recent med list.                   Brand Name Dispense Instructions for use Diagnosis    aspirin EC 81 MG EC tablet      Take 81 mg by mouth daily        DULoxetine 60 MG EC capsule    CYMBALTA    90 capsule    TAKE 1 CAPSULE DAILY    Adjustment disorder with depressed mood       lisinopril-hydrochlorothiazide 20-25 MG per tablet    PRINZIDE/ZESTORETIC    90 tablet    Take 1 tablet by mouth daily    Essential hypertension with goal blood pressure less than 140/90       lithium 300 MG capsule     180 capsule    TAKE 2 CAPSULES (600 MG) DAILY (NEED FOLLOW UP)    Bipolar I disorder, most recent episode (or current) manic, mild (H)       omeprazole 20 MG CR capsule    priLOSEC    90 capsule    TAKE 1 CAPSULE DAILY BEFORE A MEAL    Diagnosis unknown       sildenafil 100 MG cap/tab    VIAGRA    18 tablet    Take 1 tablet (100 mg) by mouth daily as needed for erectile dysfunction    Erectile dysfunction       vitamin D3 5000 UNIT/ML Liqd      Take 1 drop by  mouth    Bipolar I disorder, single manic episode, unspecified, Depression, major, recurrent (H)

## 2017-09-12 ASSESSMENT — ANXIETY QUESTIONNAIRES: GAD7 TOTAL SCORE: 0

## 2017-09-15 DIAGNOSIS — R69 DIAGNOSIS UNKNOWN: ICD-10-CM

## 2017-09-17 NOTE — PATIENT INSTRUCTIONS
Preventive Health Recommendations:   Male Ages 65 and over    Yearly exam:             See your health care provider every year in order to  o   Review health changes.   o   Discuss preventive care.    o   Review your medicines if your doctor has prescribed any.    Talk with your health care provider about whether you should have a test to screen for prostate cancer (PSA).    Every 3 years, have a diabetes test (fasting glucose). If you are at risk for diabetes, you should have this test more often.    Every 5 years, have a cholesterol test. Have this test more often if you are at risk for high cholesterol or heart disease.     Every 10 years, have a colonoscopy. Or, have a yearly FIT test (stool test). These exams will check for colon cancer.    Talk to with your health care provider about screening for Abdominal Aortic Aneurysm if you have a family history of AAA or have a history of smoking.    Shots:     Get a flu shot each year.     Get a tetanus shot every 10 years.     Talk to your doctor about your pneumonia vaccines. There are now two you should receive - Pneumovax (PPSV 23) and Prevnar (PCV 13).     Talk to your doctor about a shingles vaccine.     Talk to your doctor about the hepatitis B vaccine.  Nutrition:     Eat at least 5 servings of fruits and vegetables each day.     Eat whole-grain bread, whole-wheat pasta and brown rice instead of white grains and rice.     Talk to your provider about Calcium and Vitamin D.   Lifestyle    Exercise for at least 150 minutes a week (30 minutes a day, 5 days a week). This will help you control your weight and prevent disease.     Limit alcohol to one drink per day.     No smoking.     Wear sunscreen to prevent skin cancer.     See your dentist every six months for an exam and cleaning.   See your eye doctor every 1 to 2 years to screen for conditions such as glaucoma, macular degeneration, cataracts, etc   Your Heart is at Risk    Plaque and blood clots in the  coronary arteries reduce blood flow to the heart:  When a coronary artery narrows, less blood and oxygen flow to the heart muscle. The decreased blood flow can cause symptoms of angina. This is often felt as temporary pain or pressure in or near the chest. This can also feel like pain in the jaw, neck, and shoulder.  When a coronary artery narrows too much, very little blood and oxygen reach the heart muscle beyond the site of narrowing. If a clot forms, blood flow in the artery may stop. This can result in a heart attack. If the muscle goes without oxygen for too long, that part of the heart muscle dies.  Your whole body is at risk  Plaque buildup can lead to problems throughout the body. Common sites of artery problems include:  Brain. Arteries in the brain or leading to the brain can become blocked. When this happens, blood flow to that part of the brain is reduced and that part of the brain can t get the oxygen it needs. That portion of the brain is damaged. This is a stroke.  Kidneys. If an artery that carries blood to the kidneys is narrowed, the kidneys have a hard time filtering blood. This can lead to kidney damage.  Aorta. This is the body s main artery. It connects directly to the heart. If this artery is damaged, the affected section can weaken and balloon out. This is called an aortic aneurysm.  Legs. If arteries in the legs are clogged with plaque, cramping, or aching in the buttocks, thighs, or calves can occur when walking. This is called claudication.  Date Last Reviewed: 6/1/2016 2000-2017 [employer]. [address] [place] [address], [place] [address]. All rights reserved. This information is not intended as a substitute for professional medical care. Always follow your healthcare professional's instructions.        Weight Management: Take It Off and Keep It Off    It s easy to be motivated when you first start. The key is to stay motivated all along the way and to have realistic and  achievable goals. There are things you can do to keep yourself on the path to success.  Don t focus on daily weight gains and losses. Instead, weigh yourself no more than once a week at the same time of day. Weighing yourself each day will probably only frustrate you.    Stay motivated  Here are suggestions to keep you motivated:   Remind yourself of your goals. Post them near the refrigerator or desk where you work.  Make daily entries in your diary or journal about your activity and eating. A visual reminder of success, like a gold star, can help keep you going.  Every week, take time to look back on how much you ve accomplished, and the changes you may have made.  Try taking a nutrition class. It can help you learn new shopping, cooking, and eating skills, and also meet new people. You might try a low-fat cooking or yoga class.  Don t be hard on yourself or give up if you slip. Be patient. Learn from your mistakes and adjust your plan if you need to. Then get right back to it.  Be realistic about your goals. Talk with a dietitian or your healthcare provider about what goals are reasonable for you.   Believe that you can do it  How you think about yourself is just as important as what you do. If you don t think you can succeed, chances are you won t. Believe that you can stick to your plan and meet your goals:  If you don t meet a goal, don t use it as an excuse to give up on your whole plan. Adjust your goal and try again.  Try to understand your own attitude about food.  Are you subject to emotional eating?  Learn how to accept compliments. Even if you get embarrassed, just say  thank you.   Make a list of the things that others like about you and that you like about yourself. Add something new from time to time. Keep this list to look at when you need a lift.  Resources  The [employer] s [place] on Fitness, Sports & Nutritionwww.fitness.gov  Academy of Nutrition and  Dieteticswww.eatright.org  Healthfinderwww.healthfinder.gov  Date Last Reviewed: 2/4/2016 2000-2017 [employer]. [address] [place] [address], [place] [address]. All rights reserved. This information is not intended as a substitute for professional medical care. Always follow your healthcare professional's instructions.        Weight Management: Overcoming Your Barriers    You may have many reasons why you re not ready to lose weight. You may not feel you have the time or the skills. You may be afraid of losing weight and gaining it back again. Well, you can lose weight. And you can keep the weight off, if you make changes slowly and stick with them. Remember that you may never find the perfect time to lose weight. Decide that the right time to be healthier is now.  Common barriers  Barrier 1: I don t want to deny myself.  Barrier Buster: You don t have to! Moderation is the key:  Watch portion sizes and know when you're eating more than one serving.  Plan to ask for a doggy bag when you eat out.  Have just one.  Choose lower-fat and lower-calorie versions of your favorites.  Use a small plate instead of a normal-sized plate.   Barrier 2: I lost weight before but I gained it right back.  Barrier Buster: Make this time different:  List what worked and didn t work last time and what you can try this time.  Choose changes that you are willing to stick with.  Work exercise into your weight-loss plan.  Be realistic about what is possible. Your plan has to fit into your life in a balanced way that works for you.   Barrier 3: I don t have the time to be active.  Barrier Buster: It takes just a few minutes a day!  Be active with a pet or the kids.  Block off activity time in your schedule.  Borrow some time that you usually spend watching TV.  You are too important not to take time to exercise--it is your life!   Feel good about yourself  Do you eat more because you feel bad about yourself, then feel even worse  as you gain weight? This is a  vicious cycle.  Breaking this cycle is not easy. You may need group support or counseling. Always remember that you are a valuable person, no matter what size or shape you are.  Do you have a health problem? If so, don t use it as an excuse for not losing weight. Ask your healthcare provider or dietitian about methods to lose weight that are safe for you. For example, even if you have severe arthritis, it may be easier for you to exercise in a pool. Get advice from a .    Date Last Reviewed: 2/2/2016 2000-2017 [employer]. [address] [place] [address], [place] [address]. All rights reserved. This information is not intended as a substitute for professional medical care. Always follow your healthcare professional's instructions.        Weight Management: Healthy Eating  Food is your body s fuel. You can t live without it. The key is to give your body enough nutrients and energy without eating too much. Reading food labels can help you make healthy choices. Also, learn new eating habits to manage your weight.     All the values on the label are based on one serving. The serving size is the average portion. Remember to multiply the values on the label by the number of servings you eat.   Eat less fat  A gram of fat has almost 2.5 times the calories of a gram of protein or carbohydrates. Try to balance your food choices so that only 20% to 35% of your calories comes from total fat. This means an average of 2  to 3  grams of fat for each 100 calories you eat.  Eat more fiber  High-fiber foods are digested more slowly than low-fiber foods, so you feel full longer. Try to get at least 25 grams of fiber each day for a 2000 calorie diet. Foods high in fiber include:  Vegetables and fruits  Whole-grain or bran breads, pastas, and cereals  Legumes (beans) and peas  As you begin to eat more fiber, be sure to drink plenty of water to keep your digestive system working  smoothly.  Tips  Do's and don'ts include:   Don t skip meals. This often leads to overeating later on. It s best to spread your eating throughout the day.  Eat a variety of foods, not just a few favorites.  If you find yourself eating when you re not hungry, ask yourself why. Many of us eat when we re bored, stressed, or just to be polite. Listen to your body. If you re not hungry, get busy doing something else instead of eating.  Eat slower, shooting for 20 to 30 minutes for each meal. It takes 20 minutes for your stomach to tell your brain that it s full. Slow eaters tend to eat less and are still satisfied, while fast eaters may tend to be overeaters.   Pay attention to what you eat. Don t read or watch TV during your meal.  Date Last Reviewed: 1/31/2016 2000-2017 [employer]. [address] [place] [address]. All rights reserved. This information is not intended as a substitute for professional medical care. Always follow your healthcare professional's instructions.        Weight Management: Fact and Fiction    Knowing the truth about losing weight can help you separate what works from what doesn t. Don t be taken in by expensive weight-loss fads like pills, herbs, and special foods that promise unbelievable results. There s no magic way to lose weight. If you have questions about weight loss, ask your healthcare provider.  Fiction:  The faster I lose weight, the better.   Fact: Rapid weight loss is usually due to loss of water or muscle mass. What you re trying to get rid of is extra fat. Aim to lose a 1/2 pound to 2 pounds a week. Then you re more likely to lose fat rather than water or muscle.  Fiction:  Skipping meals will help me lose weight.   Fact: When you skip meals, you don t give your body the energy it needs to work. Hunger makes you more likely to overeat later on. It s best to spread your meals throughout the day. Eat at least three meals a day.  Fiction:  I can t start  exercising until I lose weight.   Fact: The sooner you start exercising the better. Exercise helps burn more calories, tone your muscles, and keep your appetite in check. People who continue to exercise after they lose weight are more likely to keep the weight off.  Fiction:  The fewer calories I eat, the better.   Fact: This seems like it should be true, but it s not. When you eat too few calories, your body acts as if it s on a desert island. It thinks food is scarce, so it slows down your metabolism (how fast you burn calories) to save energy. By eating too few calories, you make it harder to lose weight.  Fiction:  Once I lose weight, I can go back to living the way I did before.   Fact: Going back to your old eating habits and giving up exercise is a sure way to regain any weight you ve lost. The lifestyle changes that help you lose extra weight can also help keep it off. This is why you need to make realistic changes you can stick with.  Fiction:  Low-fat and fat-free mean low-calorie.   Fact: All foods, even fat-free ones, have calories. Eat too many calories and you ll gain weight. It s OK to treat yourself to a fat-free cookie or two. Just don t eat the whole box! A dietitian will help you figure this out, and will likely recommend that you eat three meals a day, with protein with each meal.   Date Last Reviewed: 2/4/2016 2000-2017 [employer]. [address] [place] [address], [place] [address]. All rights reserved. This information is not intended as a substitute for professional medical care. Always follow your healthcare professional's instructions.           1.94

## 2017-10-08 ENCOUNTER — HISTORY (OUTPATIENT)
Dept: EMERGENCY MEDICINE | Facility: OTHER | Age: 67
End: 2017-10-08

## 2018-03-12 ENCOUNTER — DOCUMENTATION ONLY (OUTPATIENT)
Dept: FAMILY MEDICINE | Facility: OTHER | Age: 68
End: 2018-03-12

## 2018-03-12 RX ORDER — DULOXETIN HYDROCHLORIDE 30 MG/1
30 CAPSULE, DELAYED RELEASE ORAL DAILY
COMMUNITY
End: 2018-11-26

## 2018-09-09 DIAGNOSIS — I10 ESSENTIAL HYPERTENSION WITH GOAL BLOOD PRESSURE LESS THAN 140/90: ICD-10-CM

## 2018-09-10 RX ORDER — LISINOPRIL AND HYDROCHLOROTHIAZIDE 20; 25 MG/1; MG/1
TABLET ORAL
Qty: 90 TABLET | Refills: 0 | Status: SHIPPED | OUTPATIENT
Start: 2018-09-10 | End: 2018-11-26

## 2018-09-24 DIAGNOSIS — R69 DIAGNOSIS UNKNOWN: ICD-10-CM

## 2018-09-24 DIAGNOSIS — F31.11 BIPOLAR I DISORDER, MOST RECENT EPISODE (OR CURRENT) MANIC, MILD (H): ICD-10-CM

## 2018-09-24 NOTE — LETTER
September 26, 2018      Umer Archuleta  [address] [facility] [address]        Dear Umer,     APPOINTMENT REMINDER:   Our records indicates that it is time for you to be seen for your yearly follow-up: blood pressure check, medication review, and lab work.      Your current medication request will be approved for one refill but you will need to be seen before any additional refills can be approved. Taking care of your health is important to us, and ongoing visits with your provider are vital to your care.    We look forward to seeing you in the near future.  You may call our office at [phone] to schedule a visit.     Please disregard this notice if you have already made an appointment.        Sincerely,  Glenn Segovia MD

## 2018-09-26 RX ORDER — LITHIUM CARBONATE 300 MG/1
CAPSULE ORAL
Qty: 60 CAPSULE | Refills: 0 | Status: SHIPPED | OUTPATIENT
Start: 2018-09-26 | End: 2018-11-26

## 2018-09-26 NOTE — TELEPHONE ENCOUNTER
omeprazole      Last Written Prescription Date:  9/15/17  Last Fill Quantity: 90,   # refills: 3  Last Office Visit: 9/11/17  Future Office visit:       Routing refill request to provider for review/approval because:  pcp out will route to Dr. Ramirez to advise, due for office visit - letter sent to pt today    Lithium 300 mg      Last Written Prescription Date:  9/11/17  Last Fill Quantity: 180,   # refills: 3  Last Office Visit: 9/11/17  Future Office visit:

## 2018-11-19 NOTE — PROGRESS NOTES
"  SUBJECTIVE:   Umer Archuleta is a 68 year old male who presents for Preventive Visit.  {PVP to remind patient that this is not necessarily a physical exam; physical exam may or may not be done:398261::\"click delete button to remove this line now\"}  {PVP to inform patient that additional E&M charge may apply, if additional problems addressed:326924::\"click delete button to remove this line now\"}  Are you in the first 12 months of your Medicare Part B coverage?  {No Yes:753314::\"No\"}    Physical Health:    In general, how would you rate your overall physical health? { :725641}    Outside of work, how many days during the week do you exercise? { :053676}    Outside of work, approximately how many minutes a day do you exercise?{ :089404}    If you drink alcohol do you typically have >3 drinks per day or >7 drinks per week? {ETOH :430286}    Do you usually eat at least 4 servings of fruit and vegetables a day, include whole grains & fiber and avoid regularly eating high fat or \"junk\" foods? { :001190::\"Yes\"}    Do you have any problems taking medications regularly?  { :918129::\"No\"}    Do you have any side effects from medications? { :026379}    Needs assistance for the following daily activities: { :298196}    Which of the following safety concerns are present in your home?:  { :944583::\"none identified\"}     Hearing impairment: {NO/YES:584387}    In the past 6 months, have you been bothered by leaking of urine? {YESNOBLANK:917941}    Mental Health:    In general, how would you rate your overall mental or emotional health? { :291990}  PHQ-2 Score:      Additional concerns to address?  {If YES, notify patient they may be responsible for a copay, coinsurance or deductible if the visit today includes services such as checking on a sore throat, having an x-ray or lab test, or treating and evaluating a new or existing condition :942774::\"No\"}    Fall risk:      {Document Fall Risk in the Assessments Section of the " "Navigator:349040}    {If any of the above assessments are answered yes, consider ordering appropriate referrals (Optional):979361::\"click delete button to remove this line now\"}    {AWV Cognitive Screenin}    {Outside tests to abstract? :263304}    Hypertension Follow-up      Outpatient blood pressures {ISCHECKIN}    Low Salt Diet: { :387658::\"no added salt\"}    {Depression and Anxiety Followup:930508}    Reviewed and updated as needed this visit by clinical staff         Reviewed and updated as needed this visit by Provider        Social History   Substance Use Topics     Smoking status: Former Smoker     Smokeless tobacco: Never Used     Alcohol use Not on file                             Do you feel safe in your environment - {YES/NO/NA:110760}    Do you have a Health Care Directive?: {HEALTHCARE DIRECTIVE STATUS:016204}    Current providers sharing in care for this patient include:   Patient Care Team:  Glenn Segovia MD as PCP - General    The following health maintenance items are reviewed in Epic and correct as of today:  Health Maintenance   Topic Date Due     DEPRESSION ACTION PLAN Q1 YR  1968     PERMANENT COMMENTS  04/15/2013     AORTIC ANEURYSM SCREENING (SYSTEM ASSIGNED)  2015     PHQ-9 Q6 MONTHS  2018     PNEUMOCOCCAL (2 of 2 - PPSV23) 2018     INFLUENZA VACCINE (1) 2018     FALL RISK ASSESSMENT  2018     LIPID SCREEN Q5 YR MALE (SYSTEM ASSIGNED)  09/15/2021     ADVANCE DIRECTIVE PLANNING Q5 YRS  2021     TETANUS IMMUNIZATION (SYSTEM ASSIGNED)  2023     COLON CANCER SCREEN (SYSTEM ASSIGNED)  2023     HEPATITIS C SCREENING  Completed     {Chronicprobdata (Optional):210033}    {Decision Support (Optional):073229}    ROS:  {ROS COMP:993775}    OBJECTIVE:   There were no vitals taken for this visit. Estimated body mass index is 36.26 kg/(m^2) as calculated from the following:    Height as of 17: 5' 8.5\" (1.74 m).    Weight as of " "17: 242 lb (109.8 kg).  EXAM:   {Exam :501083}    {Diagnostic Test Results (Optional):378281::\"Diagnostic Test Results:\",\"none \"}    ASSESSMENT / PLAN:   {Diag Picklist:382924}    End of Life Planning:  Patient currently has an advanced directive: { :300265}    COUNSELING:  {Medicare Counselin}    BP Readings from Last 1 Encounters:   17 108/72     Estimated body mass index is 36.26 kg/(m^2) as calculated from the following:    Height as of 17: 5' 8.5\" (1.74 m).    Weight as of 17: 242 lb (109.8 kg).    {BP Counseling- Complete if BP >= 120/80  (Optional):045889}  {Weight Management Plan (ACO) Complete if BMI is abnormal-  Ages 18-64  BMI >24.9.  Age 65+ with BMI <23 or >30 (Optional):401330}     reports that he has quit smoking. He has never used smokeless tobacco.  {Tobacco Cessation -- Complete if patient is a smoker (Optional):470504}    Appropriate preventive services were discussed with this patient, including applicable screening as appropriate for cardiovascular disease, diabetes, osteopenia/osteoporosis, and glaucoma.  As appropriate for age/gender, discussed screening for colorectal cancer, prostate cancer, breast cancer, and cervical cancer. Checklist reviewing preventive services available has been given to the patient.    Reviewed patients plan of care and provided an AVS. The {CarePlan:483635} for Umer meets the Care Plan requirement. This Care Plan has been established and reviewed with the {PATIENT, FAMILY MEMBER, CAREGIVER:989862}.    Counseling Resources:  ATP IV Guidelines  Pooled Cohorts Equation Calculator  Breast Cancer Risk Calculator  FRAX Risk Assessment  ICSI Preventive Guidelines  Dietary Guidelines for Americans, 2010  USDA's MyPlate  ASA Prophylaxis  Lung CA Screening    Glenn Segovia MD  [facility] [place]  "

## 2018-11-19 NOTE — PATIENT INSTRUCTIONS
Preventive Health Recommendations:     See your health care provider every year to    Review health changes.     Discuss preventive care.      Review your medicines if your doctor has prescribed any.      Talk with your health care provider about whether you should have a test to screen for prostate cancer (PSA).    Every 3 years, have a diabetes test (fasting glucose). If you are at risk for diabetes, you should have this test more often.    Every 5 years, have a cholesterol test. Have this test more often if you are at risk for high cholesterol or heart disease.     Every 10 years, have a colonoscopy. Or, have a yearly FIT test (stool test). These exams will check for colon cancer.    Talk to with your health care provider about screening for Abdominal Aortic Aneurysm if you have a family history of AAA or have a history of smoking.    Shots:     Get a flu shot each year.     Get a tetanus shot every 10 years.     Talk to your doctor about your pneumonia vaccines. There are now two you should receive - Pneumovax (PPSV 23) and Prevnar (PCV 13).     Talk to your pharmacist about a shingles vaccine.     Talk to your doctor about the hepatitis B vaccine.  Nutrition:     Eat at least 5 servings of fruits and vegetables each day.     Eat whole-grain bread, whole-wheat pasta and brown rice instead of white grains and rice.     Get adequate Calcium and Vitamin D.   Lifestyle    Exercise for at least 150 minutes a week (30 minutes a day, 5 days a week). This will help you control your weight and prevent disease.     Limit alcohol to one drink per day.     No smoking.     Wear sunscreen to prevent skin cancer.    See your dentist every six months for an exam and cleaning.    See your eye doctor every 1 to 2 years to screen for conditions such as glaucoma, macular degeneration, cataracts, etc.    Personalized Prevention Plan  You are due for the preventive services outlined below.  Your care team is available to assist you  in scheduling these services.  If you have already completed any of these items, please share that information with your care team to update in your medical record.  Health Maintenance Due   Topic Date Due     Depression Action Plan Review - yearly  08/25/1968     PERMANENT COMMENTS  04/15/2013     AORTIC ANEURYSM SCREENING (SYSTEM ASSIGNED)  08/25/2015     Depression Assessment - every 6 months  03/11/2018     Pneumococcal Vaccine (2 of 2 - PPSV23) 08/14/2018     Flu Vaccine (1) 09/01/2018     FALL RISK ASSESSMENT  09/11/2018           Ref Range & Units 10:17 AM   1yr ago   2yr ago        TSH 0.40 - 4.00 mU/L 2.29 2.58 2.79     Resulting Agency  [facility] [place]          Specimen Collected: 11/26/18 10:17 AM Last Resulted: 11/26/18 10:48 AM                                 Lipid Profile (Chol, Trig, HDL, LDL calc)   Status:  Final result   Visible to patient:  No (Not Released) Dx:  Medicare annual wellness visit, initi... Order: [id number]             Ref Range & Units 10:17 AM   2yr ago   5yr ago        Cholesterol <200 mg/dL 162 174 168R, CM      Triglycerides <150 mg/dL 103 93CM 72R, CM     Comments: Fasting specimen      HDL Cholesterol >39 mg/dL 44 46 37 (L)R      LDL Cholesterol Calculated <100 mg/dL 97 109 (H)CM 117R, CM     Comments: Desirable:       <100 mg/dl      Non HDL Cholesterol <130 mg/dL 118 128      Resulting Agency  [facility] [place]          Specimen Collected: 11/26/18 10:17 AM Last Resulted: 11/26/18 10:48 AM                CM=Additional comments  R=Reference range differs from displayed range                     Comprehensive metabolic panel   Status:  Final result   Visible to patient:  No (Not Released) Dx:  Medicare annual wellness visit, initi... Order: [id number]             Ref Range & Units 10:17 AM   1yr ago   2yr ago        Sodium 133 - 144 mmol/L 142 140 139      Potassium 3.4 - 5.3 mmol/L 4.0 4.4 3.7      Chloride 94 - 109 mmol/L 109 104 103      Carbon Dioxide 20 - 32  mmol/L 30 29 31      Anion Gap 3 - 14 mmol/L 3 7 5      Glucose 70 - 99 mg/dL 106 (H) 109 (H) (H)     Comments: Fasting specimen      Urea Nitrogen 7 - 30 mg/dL 23 23 15      Creatinine 0.66 - 1.25 mg/dL 1.02 1.05 0.97      GFR Estimate >60 mL/min/1.7m2 73 70CM 77CM     Comments: Non  GFR Calc      GFR Estimate If Black >60 mL/min/1.7m2 88 85CM >90   GFR ...     Comments:  GFR Calc      Calcium 8.5 - 10.1 mg/dL 9.2 9.2 8.7      Bilirubin Total 0.2 - 1.3 mg/dL 0.3 0.5 0.6      Albumin 3.4 - 5.0 g/dL 3.9 3.6 3.7      Protein Total 6.8 - 8.8 g/dL 7.6 7.5 7.6      Alkaline Phosphatase 40 - 150 U/L 58 62 65      ALT 0 - 70 U/L 100 (H) 175 (H) 80 (H)      AST 0 - 45 U/L 31 57 (H) 32     Resulting Agency  [facility] [place]          Specimen Collected: 11/26/18 10:17 AM Last Resulted: 11/26/18 10:48 AM                CM=Additional comments                      PSA Diagnostic   Status:  Final result   Visible to patient:  No (Not Released) Dx:  Medicare annual wellness visit, initi... Order: [id number]             Ref Range & Units 10:17 AM   1yr ago   2yr ago        PSA 0 - 4 ug/L 0.77 0.64CM 0.86CM     Comments: Assay Method:  Chemiluminescence using Siemens Vista analyzer     Resulting [facility] [place]          Specimen Collected: 11/26/18 10:17 AM Last Resulted: 11/26/18 10:48 AM                CM=Additional comments                      CBC with platelets and differential   Status:  Final result   Visible to patient:  No (Not Released) Dx:  Medicare annual wellness visit, initi... Order: [id number]             Ref Range & Units 10:17 AM   1yr ago   2yr ago        WBC 4.0 - 11.0 10e9/L 7.9 6.8 8.6      RBC Count 4.4 - 5.9 10e12/L 5.02 4.84 4.92      Hemoglobin 13.3 - 17.7 g/dL 15.5 15.3 15.4      Hematocrit 40.0 - 53.0 % 46.6 45.8 46.5      MCV 78 - 100 fl 93 95 95      MCH 26.5 - 33.0 pg 30.9 31.6 31.3      MCHC 31.5 - 36.5 g/dL 33.3 33.4 33.1       RDW 10.0 - 15.0 % 13.3 13.6 13.7      Platelet Count 150 - 450 10e9/L 268 242 292      Diff Method  Automated Method Automated Method Automated Method      % Neutrophils % 62.0 56.5 66.6      % Lymphocytes % 24.5 27.5 20.1      % Monocytes % 7.6 9.5 8.2      % Eosinophils % 4.7 5.6 4.3      % Basophils % 0.8 0.6 0.5      % Immature Granulocytes % 0.4 0.3 0.3      Nucleated RBCs 0 /100 0 0 0      Absolute Neutrophil 1.6 - 8.3 10e9/L 4.9 3.9 5.7      Absolute Lymphocytes 0.8 - 5.3 10e9/L 1.9 1.9 1.7      Absolute Monocytes 0.0 - 1.3 10e9/L 0.6 0.7 0.7      Absolute Eosinophils 0.0 - 0.7 10e9/L 0.4 0.4 0.4      Absolute Basophils 0.0 - 0.2 10e9/L 0.1 0.0 0.0      Abs Immature Granulocytes 0 - 0.4 10e9/L 0.0 0.0 0.0      Absolute Nucleated RBC  0.0 0.0 0.0     [facility] [place]          Specimen Collected: 11/26/18 10:17 AM Last Resulted: 11/26/18 10:23 AM                                 Status of Other Orders        Order    Lab Status Result Date Provider Status       Lithium level In process 11/26/2018 Ordered       HC FLU VACCINE, INCREASED ANTIGEN, PRESV FREE No Result  Ordered       Pneumococcal vaccine 23 valent PPSV23 (Pneumovax) [[id number]] No Result  Ordered       ADMIN MEDICARE: Pneumococcal Vaccine () No Result  Ordered       Vaccine Administration, Initial [[id number]] No Result  Ordered

## 2018-11-21 ASSESSMENT — ACTIVITIES OF DAILY LIVING (ADL): CURRENT_FUNCTION: NO ASSISTANCE NEEDED

## 2018-11-26 ENCOUNTER — OFFICE VISIT (OUTPATIENT)
Dept: FAMILY MEDICINE | Facility: OTHER | Age: 68
End: 2018-11-26
Attending: FAMILY MEDICINE
Payer: MEDICARE

## 2018-11-26 VITALS
WEIGHT: 227 LBS | TEMPERATURE: 97.8 F | SYSTOLIC BLOOD PRESSURE: 106 MMHG | HEIGHT: 67 IN | OXYGEN SATURATION: 98 % | BODY MASS INDEX: 35.63 KG/M2 | HEART RATE: 66 BPM | DIASTOLIC BLOOD PRESSURE: 66 MMHG

## 2018-11-26 DIAGNOSIS — Z23 NEED FOR PROPHYLACTIC VACCINATION AND INOCULATION AGAINST INFLUENZA: ICD-10-CM

## 2018-11-26 DIAGNOSIS — K76.0 FATTY LIVER DISEASE, NONALCOHOLIC: ICD-10-CM

## 2018-11-26 DIAGNOSIS — I10 ESSENTIAL HYPERTENSION WITH GOAL BLOOD PRESSURE LESS THAN 140/90: ICD-10-CM

## 2018-11-26 DIAGNOSIS — Z00.00 MEDICARE ANNUAL WELLNESS VISIT, INITIAL: Primary | ICD-10-CM

## 2018-11-26 DIAGNOSIS — F43.21 ADJUSTMENT DISORDER WITH DEPRESSED MOOD: ICD-10-CM

## 2018-11-26 DIAGNOSIS — F31.11 BIPOLAR I DISORDER, MOST RECENT EPISODE (OR CURRENT) MANIC, MILD (H): ICD-10-CM

## 2018-11-26 DIAGNOSIS — F33.40 RECURRENT MAJOR DEPRESSIVE DISORDER, IN REMISSION (H): ICD-10-CM

## 2018-11-26 DIAGNOSIS — R35.1 NOCTURIA: ICD-10-CM

## 2018-11-26 DIAGNOSIS — E66.01 MORBID OBESITY DUE TO EXCESS CALORIES (H): ICD-10-CM

## 2018-11-26 LAB
ALBUMIN SERPL-MCNC: 3.9 G/DL (ref 3.4–5)
ALP SERPL-CCNC: 58 U/L (ref 40–150)
ALT SERPL W P-5'-P-CCNC: 100 U/L (ref 0–70)
ANION GAP SERPL CALCULATED.3IONS-SCNC: 3 MMOL/L (ref 3–14)
AST SERPL W P-5'-P-CCNC: 31 U/L (ref 0–45)
BASOPHILS # BLD AUTO: 0.1 10E9/L (ref 0–0.2)
BASOPHILS NFR BLD AUTO: 0.8 %
BILIRUB SERPL-MCNC: 0.3 MG/DL (ref 0.2–1.3)
BUN SERPL-MCNC: 23 MG/DL (ref 7–30)
CALCIUM SERPL-MCNC: 9.2 MG/DL (ref 8.5–10.1)
CHLORIDE SERPL-SCNC: 109 MMOL/L (ref 94–109)
CHOLEST SERPL-MCNC: 162 MG/DL
CO2 SERPL-SCNC: 30 MMOL/L (ref 20–32)
CREAT SERPL-MCNC: 1.02 MG/DL (ref 0.66–1.25)
DIFFERENTIAL METHOD BLD: NORMAL
EOSINOPHIL # BLD AUTO: 0.4 10E9/L (ref 0–0.7)
EOSINOPHIL NFR BLD AUTO: 4.7 %
ERYTHROCYTE [DISTWIDTH] IN BLOOD BY AUTOMATED COUNT: 13.3 % (ref 10–15)
GFR SERPL CREATININE-BSD FRML MDRD: 73 ML/MIN/1.7M2
GLUCOSE SERPL-MCNC: 106 MG/DL (ref 70–99)
HCT VFR BLD AUTO: 46.6 % (ref 40–53)
HDLC SERPL-MCNC: 44 MG/DL
HGB BLD-MCNC: 15.5 G/DL (ref 13.3–17.7)
IMM GRANULOCYTES # BLD: 0 10E9/L (ref 0–0.4)
IMM GRANULOCYTES NFR BLD: 0.4 %
LDLC SERPL CALC-MCNC: 97 MG/DL
LITHIUM SERPL-SCNC: 0.41 MMOL/L (ref 0.6–1.2)
LYMPHOCYTES # BLD AUTO: 1.9 10E9/L (ref 0.8–5.3)
LYMPHOCYTES NFR BLD AUTO: 24.5 %
MCH RBC QN AUTO: 30.9 PG (ref 26.5–33)
MCHC RBC AUTO-ENTMCNC: 33.3 G/DL (ref 31.5–36.5)
MCV RBC AUTO: 93 FL (ref 78–100)
MONOCYTES # BLD AUTO: 0.6 10E9/L (ref 0–1.3)
MONOCYTES NFR BLD AUTO: 7.6 %
NEUTROPHILS # BLD AUTO: 4.9 10E9/L (ref 1.6–8.3)
NEUTROPHILS NFR BLD AUTO: 62 %
NONHDLC SERPL-MCNC: 118 MG/DL
NRBC # BLD AUTO: 0 10*3/UL
NRBC BLD AUTO-RTO: 0 /100
PLATELET # BLD AUTO: 268 10E9/L (ref 150–450)
POTASSIUM SERPL-SCNC: 4 MMOL/L (ref 3.4–5.3)
PROT SERPL-MCNC: 7.6 G/DL (ref 6.8–8.8)
PSA SERPL-MCNC: 0.77 UG/L (ref 0–4)
RBC # BLD AUTO: 5.02 10E12/L (ref 4.4–5.9)
SODIUM SERPL-SCNC: 142 MMOL/L (ref 133–144)
TRIGL SERPL-MCNC: 103 MG/DL
TSH SERPL DL<=0.005 MIU/L-ACNC: 2.29 MU/L (ref 0.4–4)
WBC # BLD AUTO: 7.9 10E9/L (ref 4–11)

## 2018-11-26 PROCEDURE — 85025 COMPLETE CBC W/AUTO DIFF WBC: CPT | Mod: ZL | Performed by: FAMILY MEDICINE

## 2018-11-26 PROCEDURE — G0009 ADMIN PNEUMOCOCCAL VACCINE: HCPCS | Performed by: FAMILY MEDICINE

## 2018-11-26 PROCEDURE — 99397 PER PM REEVAL EST PAT 65+ YR: CPT | Performed by: FAMILY MEDICINE

## 2018-11-26 PROCEDURE — 84153 ASSAY OF PSA TOTAL: CPT | Mod: ZL | Performed by: FAMILY MEDICINE

## 2018-11-26 PROCEDURE — 84443 ASSAY THYROID STIM HORMONE: CPT | Mod: ZL | Performed by: FAMILY MEDICINE

## 2018-11-26 PROCEDURE — 80053 COMPREHEN METABOLIC PANEL: CPT | Mod: ZL | Performed by: FAMILY MEDICINE

## 2018-11-26 PROCEDURE — 90471 IMMUNIZATION ADMIN: CPT | Performed by: FAMILY MEDICINE

## 2018-11-26 PROCEDURE — 90732 PPSV23 VACC 2 YRS+ SUBQ/IM: CPT | Performed by: FAMILY MEDICINE

## 2018-11-26 PROCEDURE — 80178 ASSAY OF LITHIUM: CPT | Mod: ZL | Performed by: FAMILY MEDICINE

## 2018-11-26 PROCEDURE — 80061 LIPID PANEL: CPT | Mod: ZL | Performed by: FAMILY MEDICINE

## 2018-11-26 PROCEDURE — 36415 COLL VENOUS BLD VENIPUNCTURE: CPT | Mod: ZL | Performed by: FAMILY MEDICINE

## 2018-11-26 PROCEDURE — 90662 IIV NO PRSV INCREASED AG IM: CPT | Performed by: FAMILY MEDICINE

## 2018-11-26 RX ORDER — LISINOPRIL AND HYDROCHLOROTHIAZIDE 20; 25 MG/1; MG/1
1 TABLET ORAL DAILY
Qty: 90 TABLET | Refills: 3 | Status: SHIPPED | OUTPATIENT
Start: 2018-11-26 | End: 2019-11-24

## 2018-11-26 RX ORDER — LITHIUM CARBONATE 300 MG/1
CAPSULE ORAL
Qty: 180 CAPSULE | Refills: 3 | Status: SHIPPED | OUTPATIENT
Start: 2018-11-26 | End: 2019-11-24

## 2018-11-26 RX ORDER — DULOXETIN HYDROCHLORIDE 60 MG/1
60 CAPSULE, DELAYED RELEASE ORAL DAILY
Qty: 90 CAPSULE | Refills: 3 | Status: SHIPPED | OUTPATIENT
Start: 2018-11-26 | End: 2019-11-24

## 2018-11-26 ASSESSMENT — ANXIETY QUESTIONNAIRES
7. FEELING AFRAID AS IF SOMETHING AWFUL MIGHT HAPPEN: NOT AT ALL
3. WORRYING TOO MUCH ABOUT DIFFERENT THINGS: NOT AT ALL
1. FEELING NERVOUS, ANXIOUS, OR ON EDGE: NOT AT ALL
4. TROUBLE RELAXING: NOT AT ALL
IF YOU CHECKED OFF ANY PROBLEMS ON THIS QUESTIONNAIRE, HOW DIFFICULT HAVE THESE PROBLEMS MADE IT FOR YOU TO DO YOUR WORK, TAKE CARE OF THINGS AT HOME, OR GET ALONG WITH OTHER PEOPLE: NOT DIFFICULT AT ALL
6. BECOMING EASILY ANNOYED OR IRRITABLE: SEVERAL DAYS
5. BEING SO RESTLESS THAT IT IS HARD TO SIT STILL: NOT AT ALL
2. NOT BEING ABLE TO STOP OR CONTROL WORRYING: NOT AT ALL
GAD7 TOTAL SCORE: 1

## 2018-11-26 ASSESSMENT — PATIENT HEALTH QUESTIONNAIRE - PHQ9: SUM OF ALL RESPONSES TO PHQ QUESTIONS 1-9: 1

## 2018-11-26 ASSESSMENT — PAIN SCALES - GENERAL: PAINLEVEL: NO PAIN (0)

## 2018-11-26 ASSESSMENT — ACTIVITIES OF DAILY LIVING (ADL): CURRENT_FUNCTION: NO ASSISTANCE NEEDED

## 2018-11-26 NOTE — PROGRESS NOTES
"SUBJECTIVE:   Umer Archuleta is a 68 year old male who presents for Preventive Visit.      Are you in the first 12 months of your Medicare coverage?  No    Annual Wellness Visit     In general, how would you rate your overall health?  Good    Frequency of exercise:  None    Do you usually eat at least 4 servings of fruit and vegetables a day, include whole grains    & fiber and avoid regularly eating high fat or \"junk\" foods?  No    Taking medications regularly:  Yes    Medication side effects:  None    Ability to successfully perform activities of daily living:  No assistance needed    Home Safety:  No safety concerns identified    Hearing Impairment:  Difficulty following a conversation in a noisy restaurant or crowded room and need to ask people to speak up or repeat themselves    In the past 6 months, have you been bothered by leaking of urine?  No    In general, how would you rate your overall mental or emotional health?  Good    PHQ-2 Total Score: 0    Additional concerns today:  No        Fall risk:     click delete button to remove this line now  COGNITIVE SCREEN  1) Repeat 3 items (Leader, Season, Table)    2) Clock draw: NORMAL  3) 3 item recall: Recalls 3 objects  Results: 3 items recalled: COGNITIVE IMPAIRMENT LESS LIKELY    Mini-CogTM Copyright S Meliza. Licensed by the author for use in [facility]; reprinted with permission ([email or web address]). All rights reserved.      PHQ-9 SCORE 7/19/2017 9/11/2017 11/26/2018   Total Score - - -   Total Score 4 5 1     KACIE-7 SCORE 7/19/2017 9/11/2017 11/26/2018   Total Score 0 0 1         Reviewed and updated as needed this visit by clinical staff  Tobacco  Allergies  Meds  Med Hx  Surg Hx  Fam Hx  Soc Hx        Reviewed and updated as needed this visit by Provider        Social History   Substance Use Topics     Smoking status: Former Smoker     Smokeless tobacco: Never Used     Alcohol use Not on file       Alcohol Use 11/21/2018   If you drink " alcohol do you typically have greater than 3 drinks per day OR greater than 7 drinks per week? No           Hypertension Follow-up      Outpatient blood pressures are not being checked.    Low Salt Diet: low salt    Depression Followup    Status since last visit: Stable     See PHQ-9 for current symptoms.  Other associated symptoms: None    Complicating factors:   Significant life event:  No   Current substance abuse:  None  Anxiety or Panic symptoms:  No    PHQ 9/15/2016 7/19/2017 9/11/2017   PHQ-9 Total Score 2 4 5   Q9: Suicide Ideation Not at all Not at all Not at all       PHQ-9  English  PHQ-9   Any Language  Suicide Assessment Five-step Evaluation and Treatment (SAFE-T)    Do you feel safe in your environment - Yes    Do you have a Health Care Directive?: No: Advance care planning was reviewed with patient; patient declined at this time.    Current providers sharing in care for this patient include:   Patient Care Team:  Glenn Segovia MD as PCP - General    The following health maintenance items are reviewed in Epic and correct as of today:  Health Maintenance   Topic Date Due     DEPRESSION ACTION PLAN Q1 YR  08/25/1968     PERMANENT COMMENTS  04/15/2013     AORTIC ANEURYSM SCREENING (SYSTEM ASSIGNED)  08/25/2015     PHQ-9 Q6 MONTHS  03/11/2018     PNEUMOCOCCAL (2 of 2 - PPSV23) 08/14/2018     INFLUENZA VACCINE (1) 09/01/2018     FALL RISK ASSESSMENT  09/11/2018     LIPID SCREEN Q5 YR MALE (SYSTEM ASSIGNED)  09/15/2021     ADVANCE DIRECTIVE PLANNING Q5 YRS  09/21/2021     TETANUS IMMUNIZATION (SYSTEM ASSIGNED)  08/14/2023     COLON CANCER SCREEN (SYSTEM ASSIGNED)  08/29/2023     HEPATITIS C SCREENING  Completed     Labs reviewed in EPIC        Review of Systems  Constitutional, HEENT, cardiovascular, pulmonary, GI, , musculoskeletal, neuro, skin, endocrine and psych systems are negative, except as otherwise noted.  WIFE CONCERNS OF NOT ACTIVE AND SITS AROUND A LOT.   OBJECTIVE:   /66  Pulse 66   "Temp 97.8  F (36.6  C)  Ht 5' 7.25\" (1.708 m)  Wt 227 lb (103 kg)  SpO2 98%  BMI 35.29 kg/m2 Estimated body mass index is 35.29 kg/(m^2) as calculated from the following:    Height as of this encounter: 5' 7.25\" (1.708 m).    Weight as of this encounter: 227 lb (103 kg).  Physical Exam  GENERAL: healthy, alert and no distress  EYES: Eyes grossly normal to inspection, PERRL and conjunctivae and sclerae normal  HENT: ear canals and TM's normal, nose and mouth without ulcers or lesions  NECK: no adenopathy, no asymmetry, masses, or scars and thyroid normal to palpation  RESP: lungs clear to auscultation - no rales, rhonchi or wheezes  CV: regular rate and rhythm, normal S1 S2, no S3 or S4, no murmur, click or rub, no peripheral edema and peripheral pulses strong  ABDOMEN: soft, nontender, no hepatosplenomegaly, no masses and bowel sounds normal   (male): normal male genitalia without lesions or urethral discharge, no hernia  RECTAL: normal sphincter tone, no rectal masses, prostate normal size, smooth, nontender without nodules or masses  MS: no gross musculoskeletal defects noted, no edema  SKIN: no suspicious lesions or rashes  NEURO: Normal strength and tone, mentation intact and speech normal  PSYCH: mentation appears normal, affect normal/bright    Results for orders placed or performed in visit on 11/26/18   Lipid Profile (Chol, Trig, HDL, LDL calc)   Result Value Ref Range    Cholesterol 162 <200 mg/dL    Triglycerides 103 <150 mg/dL    HDL Cholesterol 44 >39 mg/dL    LDL Cholesterol Calculated 97 <100 mg/dL    Non HDL Cholesterol 118 <130 mg/dL   Comprehensive metabolic panel   Result Value Ref Range    Sodium 142 133 - 144 mmol/L    Potassium 4.0 3.4 - 5.3 mmol/L    Chloride 109 94 - 109 mmol/L    Carbon Dioxide 30 20 - 32 mmol/L    Anion Gap 3 3 - 14 mmol/L    Glucose 106 (H) 70 - 99 mg/dL    Urea Nitrogen 23 7 - 30 mg/dL    Creatinine 1.02 0.66 - 1.25 mg/dL    GFR Estimate 73 >60 mL/min/1.7m2    GFR " Estimate If Black 88 >60 mL/min/1.7m2    Calcium 9.2 8.5 - 10.1 mg/dL    Bilirubin Total 0.3 0.2 - 1.3 mg/dL    Albumin 3.9 3.4 - 5.0 g/dL    Protein Total 7.6 6.8 - 8.8 g/dL    Alkaline Phosphatase 58 40 - 150 U/L     (H) 0 - 70 U/L    AST 31 0 - 45 U/L   CBC with platelets and differential   Result Value Ref Range    WBC 7.9 4.0 - 11.0 10e9/L    RBC Count 5.02 4.4 - 5.9 10e12/L    Hemoglobin 15.5 13.3 - 17.7 g/dL    Hematocrit 46.6 40.0 - 53.0 %    MCV 93 78 - 100 fl    MCH 30.9 26.5 - 33.0 pg    MCHC 33.3 31.5 - 36.5 g/dL    RDW 13.3 10.0 - 15.0 %    Platelet Count 268 150 - 450 10e9/L    Diff Method Automated Method     % Neutrophils 62.0 %    % Lymphocytes 24.5 %    % Monocytes 7.6 %    % Eosinophils 4.7 %    % Basophils 0.8 %    % Immature Granulocytes 0.4 %    Nucleated RBCs 0 0 /100    Absolute Neutrophil 4.9 1.6 - 8.3 10e9/L    Absolute Lymphocytes 1.9 0.8 - 5.3 10e9/L    Absolute Monocytes 0.6 0.0 - 1.3 10e9/L    Absolute Eosinophils 0.4 0.0 - 0.7 10e9/L    Absolute Basophils 0.1 0.0 - 0.2 10e9/L    Abs Immature Granulocytes 0.0 0 - 0.4 10e9/L    Absolute Nucleated RBC 0.0    PSA Diagnostic   Result Value Ref Range    PSA 0.77 0 - 4 ug/L   TSH   Result Value Ref Range    TSH 2.29 0.40 - 4.00 mU/L         ASSESSMENT / PLAN:   1. Medicare annual wellness visit, initial  Overall doing ok BUT sedetary lifestyle- discussed needs to exercise body and brain See in a year   - Lipid Profile (Chol, Trig, HDL, LDL calc); Future  - Comprehensive metabolic panel; Future  - CBC with platelets and differential; Future  - Lithium level; Future  - PSA Diagnostic; Future  - Lipid Profile (Chol, Trig, HDL, LDL calc)  - Comprehensive metabolic panel  - CBC with platelets and differential  - Lithium level  - PSA Diagnostic    2. Nocturia  Mild - psa   - PSA Diagnostic; Future  - PSA Diagnostic    3. Recurrent major depressive disorder, in remission (H)  Stable - needs long term meds. Continue current medications and  behavioral changes.   - Comprehensive metabolic panel; Future  - CBC with platelets and differential; Future  - Lithium level; Future  - TSH; Future  - Comprehensive metabolic panel  - CBC with platelets and differential  - Lithium level  - TSH    4. Essential hypertension with goal blood pressure less than 140/90  Stable - Continue current medications and behavioral changes.   - Comprehensive metabolic panel; Future  - CBC with platelets and differential; Future  - Comprehensive metabolic panel  - CBC with platelets and differential  - lisinopril-hydrochlorothiazide (PRINZIDE/ZESTORETIC) 20-25 MG per tablet; Take 1 tablet by mouth daily  Dispense: 90 tablet; Refill: 3    5. Morbid obesity due to excess calories (H)  Wt down but still too high - needs to move more.   - Lipid Profile (Chol, Trig, HDL, LDL calc); Future  - Comprehensive metabolic panel; Future  - CBC with platelets and differential; Future  - Lipid Profile (Chol, Trig, HDL, LDL calc)  - Comprehensive metabolic panel  - CBC with platelets and differential    6. Fatty liver disease, nonalcoholic  Better.   - Lipid Profile (Chol, Trig, HDL, LDL calc); Future  - Lipid Profile (Chol, Trig, HDL, LDL calc)    7. Need for prophylactic vaccination and inoculation against influenza  Shots given   -  FLU VACCINE, INCREASED ANTIGEN, PRESV FREE  - Pneumococcal vaccine 23 valent PPSV23  (Pneumovax) [[id number]]  - ADMIN MEDICARE: Pneumococcal Vaccine ()  - Vaccine Administration, Initial [[id number]]    8. Adjustment disorder with depressed mood  Stable - needs long term meds   - DULoxetine (CYMBALTA) 60 MG capsule; Take 1 capsule (60 mg) by mouth daily  Dispense: 90 capsule; Refill: 3    9. Bipolar I disorder, most recent episode (or current) manic, mild (H)  Stable on lithium   - lithium 300 MG capsule; TAKE 2 CAPSULES DAILY (NEED FOLLOW UP)  Dispense: 180 capsule; Refill: 3    10. Morbid obesity (H)  As above. Discussed behavioral changes to improve obesity  "including increase diet, decreasing carbs along with other changes in diet and consider seeing dietician or enroll in a watch watcher's type program.         End of Life Planning:  Patient currently has an advanced directive: No.  I have verified the patient's ablity to prepare an advanced directive/make health care decisions.  Literature was provided to assist patient in preparing an advanced directive.    COUNSELING:  Reviewed preventive health counseling, as reflected in patient instructions       Regular exercise       Healthy diet/nutrition       Colon cancer screening       Prostate cancer screening    BP Readings from Last 1 Encounters:   11/26/18 106/66     Estimated body mass index is 35.29 kg/(m^2) as calculated from the following:    Height as of this encounter: 5' 7.25\" (1.708 m).    Weight as of this encounter: 227 lb (103 kg).      Weight management plan: Discussed healthy diet and exercise guidelines and patient will follow up in 12 months in clinic to re-evaluate.     reports that he has quit smoking. He has never used smokeless tobacco.      Appropriate preventive services were discussed with this patient, including applicable screening as appropriate for cardiovascular disease, diabetes, osteopenia/osteoporosis, and glaucoma.  As appropriate for age/gender, discussed screening for colorectal cancer, prostate cancer, breast cancer, and cervical cancer. Checklist reviewing preventive services available has been given to the patient.    Reviewed patients plan of care and provided an AVS. The Basic Care Plan (routine screening as documented in Health Maintenance) for Umer meets the Care Plan requirement. This Care Plan has been established and reviewed with the Patient.    Counseling Resources:  ATP IV Guidelines  Pooled Cohorts Equation Calculator  Breast Cancer Risk Calculator  FRAX Risk Assessment  ICSI Preventive Guidelines  Dietary Guidelines for Americans, 2010  USDA's MyPlate  ASA " Prophylaxis  Lung CA Screening    Glenn Segovia MD  [facility] [place]    Injectable Influenza Immunization Documentation    1.  Is the person to be vaccinated sick today?   No    2. Does the person to be vaccinated have an allergy to a component   of the vaccine?   No  Egg Allergy Algorithm Link    3. Has the person to be vaccinated ever had a serious reaction   to influenza vaccine in the past?   No    4. Has the person to be vaccinated ever had Guillain-Barré syndrome?   No    Form completed by Estuardo Graham  Prior to injection verified patient identity using patient's name and date of birth.

## 2018-11-26 NOTE — MR AVS SNAPSHOT
After Visit Summary   11/26/2018    Umer Archuleta    MRN: [id number]           Patient Information     Date Of Birth          1950        Visit Information        Provider Department      11/26/2018 10:00 AM Glenn Segovia MD [facility]        Today's Diagnoses     Medicare annual wellness visit, initial    -  1    Nocturia        Recurrent major depressive disorder, in remission (H)        Essential hypertension with goal blood pressure less than 140/90        Morbid obesity due to excess calories (H)        Fatty liver disease, nonalcoholic        Need for prophylactic vaccination and inoculation against influenza        Adjustment disorder with depressed mood        Bipolar I disorder, most recent episode (or current) manic, mild (H)          Care Instructions      Preventive Health Recommendations:     See your health care provider every year to    Review health changes.     Discuss preventive care.      Review your medicines if your doctor has prescribed any.      Talk with your health care provider about whether you should have a test to screen for prostate cancer (PSA).    Every 3 years, have a diabetes test (fasting glucose). If you are at risk for diabetes, you should have this test more often.    Every 5 years, have a cholesterol test. Have this test more often if you are at risk for high cholesterol or heart disease.     Every 10 years, have a colonoscopy. Or, have a yearly FIT test (stool test). These exams will check for colon cancer.    Talk to with your health care provider about screening for Abdominal Aortic Aneurysm if you have a family history of AAA or have a history of smoking.    Shots:     Get a flu shot each year.     Get a tetanus shot every 10 years.     Talk to your doctor about your pneumonia vaccines. There are now two you should receive - Pneumovax (PPSV 23) and Prevnar (PCV 13).     Talk to your pharmacist about a shingles vaccine.      Talk to your doctor about the hepatitis B vaccine.  Nutrition:     Eat at least 5 servings of fruits and vegetables each day.     Eat whole-grain bread, whole-wheat pasta and brown rice instead of white grains and rice.     Get adequate Calcium and Vitamin D.   Lifestyle    Exercise for at least 150 minutes a week (30 minutes a day, 5 days a week). This will help you control your weight and prevent disease.     Limit alcohol to one drink per day.     No smoking.     Wear sunscreen to prevent skin cancer.    See your dentist every six months for an exam and cleaning.    See your eye doctor every 1 to 2 years to screen for conditions such as glaucoma, macular degeneration, cataracts, etc.    Personalized Prevention Plan  You are due for the preventive services outlined below.  Your care team is available to assist you in scheduling these services.  If you have already completed any of these items, please share that information with your care team to update in your medical record.  Health Maintenance Due   Topic Date Due     Depression Action Plan Review - yearly  08/25/1968     PERMANENT COMMENTS  04/15/2013     AORTIC ANEURYSM SCREENING (SYSTEM ASSIGNED)  08/25/2015     Depression Assessment - every 6 months  03/11/2018     Pneumococcal Vaccine (2 of 2 - PPSV23) 08/14/2018     Flu Vaccine (1) 09/01/2018     FALL RISK ASSESSMENT  09/11/2018           Ref Range & Units 10:17 AM   1yr ago   2yr ago        TSH 0.40 - 4.00 mU/L 2.29 2.58 2.79     Resulting Agency  [facility]          Specimen Collected: 11/26/18 10:17 AM Last Resulted: 11/26/18 10:48 AM                                 Lipid Profile (Chol, Trig, HDL, LDL calc)   Status:  Final result   Visible to patient:  No (Not Released) Dx:  Medicare annual wellness visit, initi... Order: [id number]             Ref Range & Units 10:17 AM   2yr ago   5yr ago        Cholesterol <200 mg/dL 162 174 168R, CM      Triglycerides <150 mg/dL 103 93CM  72R, CM     Comments: Fasting specimen      HDL Cholesterol >39 mg/dL 44 46 37 (L)R      LDL Cholesterol Calculated <100 mg/dL 97 109 (H)CM 117R, CM     Comments: Desirable:       <100 mg/dl      Non HDL Cholesterol <130 mg/dL 118 128      Resulting Agency  [facility]          Specimen Collected: 11/26/18 10:17 AM Last Resulted: 11/26/18 10:48 AM                CM=Additional comments  R=Reference range differs from displayed range                     Comprehensive metabolic panel   Status:  Final result   Visible to patient:  No (Not Released) Dx:  Medicare annual wellness visit, initi... Order: [id number]             Ref Range & Units 10:17 AM   1yr ago   2yr ago        Sodium 133 - 144 mmol/L 142 140 139      Potassium 3.4 - 5.3 mmol/L 4.0 4.4 3.7      Chloride 94 - 109 mmol/L 109 104 103      Carbon Dioxide 20 - 32 mmol/L 30 29 31      Anion Gap 3 - 14 mmol/L 3 7 5      Glucose 70 - 99 mg/dL 106 (H) 109 (H) (H)     Comments: Fasting specimen      Urea Nitrogen 7 - 30 mg/dL 23 23 15      Creatinine 0.66 - 1.25 mg/dL 1.02 1.05 0.97      GFR Estimate >60 mL/min/1.7m2 73 70CM 77CM     Comments: Non  GFR Calc      GFR Estimate If Black >60 mL/min/1.7m2 88 85CM >90   GFR ...     Comments:  GFR Calc      Calcium 8.5 - 10.1 mg/dL 9.2 9.2 8.7      Bilirubin Total 0.2 - 1.3 mg/dL 0.3 0.5 0.6      Albumin 3.4 - 5.0 g/dL 3.9 3.6 3.7      Protein Total 6.8 - 8.8 g/dL 7.6 7.5 7.6      Alkaline Phosphatase 40 - 150 U/L 58 62 65      ALT 0 - 70 U/L 100 (H) 175 (H) 80 (H)      AST 0 - 45 U/L 31 57 (H) 32     Resulting Agency  [facility]          Specimen Collected: 11/26/18 10:17 AM Last Resulted: 11/26/18 10:48 AM                CM=Additional comments                      PSA Diagnostic   Status:  Final result   Visible to patient:  No (Not Released) Dx:  Medicare annual wellness visit, initi... Order: [id number]             Ref Range & Units 10:17 AM    1yr ago   2yr ago        PSA 0 - 4 ug/L 0.77 0.64CM 0.86CM     Comments: Assay Method:  Chemiluminescence using Siemens Vista analyzer     [facility]          Specimen Collected: 11/26/18 10:17 AM Last Resulted: 11/26/18 10:48 AM                CM=Additional comments                      CBC with platelets and differential   Status:  Final result   Visible to patient:  No (Not Released) Dx:  Medicare annual wellness visit, initi... Order: [id number]             Ref Range & Units 10:17 AM   1yr ago   2yr ago        WBC 4.0 - 11.0 10e9/L 7.9 6.8 8.6      RBC Count 4.4 - 5.9 10e12/L 5.02 4.84 4.92      Hemoglobin 13.3 - 17.7 g/dL 15.5 15.3 15.4      Hematocrit 40.0 - 53.0 % 46.6 45.8 46.5      MCV 78 - 100 fl 93 95 95      MCH 26.5 - 33.0 pg 30.9 31.6 31.3      MCHC 31.5 - 36.5 g/dL 33.3 33.4 33.1      RDW 10.0 - 15.0 % 13.3 13.6 13.7      Platelet Count 150 - 450 10e9/L 268 242 292      Diff Method  Automated Method Automated Method Automated Method      % Neutrophils % 62.0 56.5 66.6      % Lymphocytes % 24.5 27.5 20.1      % Monocytes % 7.6 9.5 8.2      % Eosinophils % 4.7 5.6 4.3      % Basophils % 0.8 0.6 0.5      % Immature Granulocytes % 0.4 0.3 0.3      Nucleated RBCs 0 /100 0 0 0      Absolute Neutrophil 1.6 - 8.3 10e9/L 4.9 3.9 5.7      Absolute Lymphocytes 0.8 - 5.3 10e9/L 1.9 1.9 1.7      Absolute Monocytes 0.0 - 1.3 10e9/L 0.6 0.7 0.7      Absolute Eosinophils 0.0 - 0.7 10e9/L 0.4 0.4 0.4      Absolute Basophils 0.0 - 0.2 10e9/L 0.1 0.0 0.0      Abs Immature Granulocytes 0 - 0.4 10e9/L 0.0 0.0 0.0      Absolute Nucleated RBC  0.0 0.0 0.0     [facility]          Specimen Collected: 11/26/18 10:17 AM Last Resulted: 11/26/18 10:23 AM                                 Status of Other Orders        Order    Lab Status Result Date Provider Status       Lithium level In process 11/26/2018 Ordered       HC FLU VACCINE, INCREASED ANTIGEN, PRESV FREE No Result  Ordered       Pneumococcal  "vaccine 23 valent PPSV23 (Pneumovax) [[id number]] No Result  Ordered       ADMIN MEDICARE: Pneumococcal Vaccine () No Result  Ordered       Vaccine Administration, Initial [[id number]] No Result  Ordered                Follow-ups after your visit        Who to contact     If you have questions or need follow up information about today's clinic visit or your schedule please contact [facility] directly at [phone].  Normal or non-critical lab and imaging results will be communicated to you by [employer], letter or phone within 4 business days after the clinic has received the results. If you do not hear from us within 7 days, please contact the clinic through [employer] or phone. If you have a critical or abnormal lab result, we will notify you by phone as soon as possible.  Submit refill requests through [employer] or call your pharmacy and they will forward the refill request to us. Please allow 3 business days for your refill to be completed.          Additional Information About Your Visit        [email or web address] Information     [employer] gives you secure access to your electronic health record. If you see a primary care provider, you can also send messages to your care team and make appointments. If you have questions, please call your primary care clinic.  If you do not have a primary care provider, please call [phone] and they will assist you.        Care EveryWhere ID     This is your Care EveryWhere ID. This could be used by other organizations to access your [place] medical records  [id number]        Your Vitals Were     Pulse Temperature Height Pulse Oximetry BMI (Body Mass Index)       66 97.8  F (36.6  C) 5' 7.25\" (1.708 m) 98% 35.29 kg/m2        Blood Pressure from Last 3 Encounters:   11/26/18 106/66   09/11/17 108/72   07/19/17 118/78    Weight from Last 3 Encounters:   11/26/18 227 lb (103 kg)   09/11/17 242 lb (109.8 kg)   07/19/17 239 lb (108.4 kg)              We Performed the Following  "    ADMIN MEDICARE: Pneumococcal Vaccine ()     CBC with platelets and differential     Comprehensive metabolic panel     HC FLU VACCINE, INCREASED ANTIGEN, PRESV FREE     Lipid Profile (Chol, Trig, HDL, LDL calc)     Lithium level     OFFICE/OUTPT VISIT,EST,LEVL III     Pneumococcal vaccine 23 valent PPSV23  (Pneumovax) [[id number]]     PSA Diagnostic     TSH     Vaccine Administration, Initial [[id number]]          Today's Medication Changes          These changes are accurate as of 11/26/18 11:02 AM.  If you have any questions, ask your nurse or doctor.               These medicines have changed or have updated prescriptions.        Dose/Directions    DULoxetine 60 MG capsule   Commonly known as:  CYMBALTA   This may have changed:    - See the new instructions.  - Another medication with the same name was removed. Continue taking this medication, and follow the directions you see here.   Used for:  Adjustment disorder with depressed mood   Changed by:  Glenn Segovia MD        Dose:  60 mg   Take 1 capsule (60 mg) by mouth daily   Quantity:  90 capsule   Refills:  3       lisinopril-hydrochlorothiazide 20-25 MG per tablet   Commonly known as:  PRINZIDE/ZESTORETIC   This may have changed:  See the new instructions.   Used for:  Essential hypertension with goal blood pressure less than 140/90   Changed by:  Glenn Segovia MD        Dose:  1 tablet   Take 1 tablet by mouth daily   Quantity:  90 tablet   Refills:  3            Where to get your medicines      These medications were sent to [facility]  [place] [address] [facility]  [address] [address]     Phone:  [phone]     DULoxetine 60 MG capsule    lisinopril-hydrochlorothiazide 20-25 MG per tablet    lithium 300 MG capsule    omeprazole 20 MG CR capsule                Primary Care Provider Office Phone # Fax #    Glenn Segovia [phone]       [address] [place], [place] [id number]         Equal Access to Services     [facility] : Hadii aad ku hadjoseklever [place], warobertda luvumabelha, qalayhawk ramoslobitorussel lewis. So [facility] [phone].    ATENCIÓN: Si habla español, tiene a avelar disposición servicios gratuitos de asistencia lingüística. Lianaame al [phone].    We comply with applicable federal civil rights laws and [place] laws. We do not discriminate on the basis of race, color, national origin, age, disability, sex, sexual orientation, or gender identity.            Thank you!     Thank you for choosing [facility]  for your care. Our goal is always to provide you with excellent care. Hearing back from our patients is one way we can continue to improve our services. Please take a few minutes to complete the written survey that you may receive in the mail after your visit with us. Thank you!             Your Updated Medication List - Protect others around you: Learn how to safely use, store and throw away your medicines at www.disposemymeds.org.          This list is accurate as of 11/26/18 11:02 AM.  Always use your most recent med list.                   Brand Name Dispense Instructions for use Diagnosis    aspirin 81 MG EC tablet      Take 81 mg by mouth daily        DULoxetine 60 MG capsule    CYMBALTA    90 capsule    Take 1 capsule (60 mg) by mouth daily    Adjustment disorder with depressed mood       lisinopril-hydrochlorothiazide 20-25 MG per tablet    PRINZIDE/ZESTORETIC    90 tablet    Take 1 tablet by mouth daily    Essential hypertension with goal blood pressure less than 140/90       lithium 300 MG capsule     180 capsule    TAKE 2 CAPSULES DAILY (NEED FOLLOW UP)    Bipolar I disorder, most recent episode (or current) manic, mild (H)       omeprazole 20 MG CR capsule    priLOSEC    90 capsule    TAKE 1 CAPSULE DAILY BEFORE A MEAL        sildenafil 100 MG tablet    VIAGRA    18 tablet    Take 1 tablet (100 mg) by mouth daily as  needed for erectile dysfunction    Erectile dysfunction       vitamin D3 5000 UNIT/ML Liqd      Take 1 drop by mouth    Bipolar I disorder, single manic episode, unspecified, Depression, major, recurrent (H)

## 2018-11-26 NOTE — LETTER
My Depression Action Plan  Name: Umer Archuleta   Date of Birth 1950  Date: 11/26/2018    My doctor: Glenn Segovia   My clinic: [facility]  [address]  [place] [address]  [phone]          GREEN    ZONE   Good Control    What it looks like:     Things are going generally well. You have normal up s and down s. You may even feel depressed from time to time, but bad moods usually last less than a day.   What you need to do:  1. Continue to care for yourself (see self care plan)  2. Check your depression survival kit and update it as needed  3. Follow your physician s recommendations including any medication.  4. Do not stop taking medication unless you consult with your physician first.           YELLOW         ZONE Getting Worse    What it looks like:     Depression is starting to interfere with your life.     It may be hard to get out of bed; you may be starting to isolate yourself from others.    Symptoms of depression are starting to last most all day and this has happened for several days.     You may have suicidal thoughts but they are not constant.   What you need to do:     1. Call your care team, your response to treatment will improve if you keep your care team informed of your progress. Yellow periods are signs an adjustment may need to be made.     2. Continue your self-care, even if you have to fake it!    3. Talk to someone in your support network    4. Open up your depression survival kit           RED    ZONE Medical Alert - Get Help    What it looks like:     Depression is seriously interfering with your life.     You may experience these or other symptoms: You can t get out of bed most days, can t work or engage in other necessary activities, you have trouble taking care of basic hygiene, or basic responsibilities, thoughts of suicide or death that will not go away, self-injurious behavior.     What you need to do:  1. Call your care team and  request a same-day appointment. If they are not available (weekends or after hours) call your local crisis line, emergency room or 911.            Depression Self Care Plan / Survival Kit    Self-Care for Depression  Here s the deal. Your body and mind are really not as separate as most people think.  What you do and think affects how you feel and how you feel influences what you do and think. This means if you do things that people who feel good do, it will help you feel better.  Sometimes this is all it takes.  There is also a place for medication and therapy depending on how severe your depression is, so be sure to consult with your medical provider and/ or Behavioral Health Consultant if your symptoms are worsening or not improving.     In order to better manage my stress, I will:    Exercise  Get some form of exercise, every day. This will help reduce pain and release endorphins, the  feel good  chemicals in your brain. This is almost as good as taking antidepressants!  This is not the same as joining a gym and then never going! (they count on that by the way ) It can be as simple as just going for a walk or doing some gardening, anything that will get you moving.      Hygiene   Maintain good hygiene (Get out of bed in the morning, Make your bed, Brush your teeth, Take a shower, and Get dressed like you were going to work, even if you are unemployed).  If your clothes don't fit try to get ones that do.    Diet  I will strive to eat foods that are good for me, drink plenty of water, and avoid excessive sugar, caffeine, alcohol, and other mood-altering substances.  Some foods that are helpful in depression are: complex carbohydrates, B vitamins, flaxseed, fish or fish oil, fresh fruits and vegetables.    Psychotherapy  I agree to participate in Individual Therapy (if recommended).    Medication  If prescribed medications, I agree to take them.  Missing doses can result in serious side effects.  I understand that  drinking alcohol, or other illicit drug use, may cause potential side effects.  I will not stop my medication abruptly without first discussing it with my provider.    Staying Connected With Others  I will stay in touch with my friends, family members, and my primary care provider/team.    Use your imagination  Be creative.  We all have a creative side; it doesn t matter if it s oil painting, sand castles, or mud pies! This will also kick up the endorphins.    Witness Beauty  (AKA stop and smell the roses) Take a look outside, even in mid-winter. Notice colors, textures. Watch the squirrels and birds.     Service to others  Be of service to others.  There is always someone else in need.  By helping others we can  get out of ourselves  and remember the really important things.  This also provides opportunities for practicing all the other parts of the program.    Humor  Laugh and be silly!  Adjust your TV habits for less news and crime-drama and more comedy.    Control your stress  Try breathing deep, massage therapy, biofeedback, and meditation. Find time to relax each day.     My support system    Clinic Contact:  Phone number:    Contact 1:  Phone number:    Contact 2:  Phone number:    [facility]/:  Phone number:    Therapist:  Phone number:    Local crisis center:    Phone number:    Other community support:  Phone number:

## 2018-11-26 NOTE — NURSING NOTE
"Chief Complaint   Patient presents with     Medicare Visit       Initial /66  Pulse 66  Temp 97.8  F (36.6  C)  Ht 5' 7.25\" (1.708 m)  Wt 227 lb (103 kg)  SpO2 98%  BMI 35.29 kg/m2 Estimated body mass index is 35.29 kg/(m^2) as calculated from the following:    Height as of this encounter: 5' 7.25\" (1.708 m).    Weight as of this encounter: 227 lb (103 kg).  Medication Reconciliation: complete    Estuardo Graham LPN  "

## 2018-11-27 ASSESSMENT — ANXIETY QUESTIONNAIRES: GAD7 TOTAL SCORE: 1

## 2019-06-05 ENCOUNTER — MEDICAL CORRESPONDENCE (OUTPATIENT)
Dept: HEALTH INFORMATION MANAGEMENT | Facility: CLINIC | Age: 69
End: 2019-06-05

## 2019-11-04 ENCOUNTER — HEALTH MAINTENANCE LETTER (OUTPATIENT)
Age: 69
End: 2019-11-04

## 2020-02-16 ENCOUNTER — HEALTH MAINTENANCE LETTER (OUTPATIENT)
Age: 70
End: 2020-02-16

## 2020-02-24 DIAGNOSIS — F31.11 BIPOLAR I DISORDER, MOST RECENT EPISODE (OR CURRENT) MANIC, MILD (H): ICD-10-CM

## 2020-02-25 NOTE — TELEPHONE ENCOUNTER
Lithium      Last Written Prescription Date:  11/26/19  Last Fill Quantity: 180,   # refills: 0  Last Office Visit: 11/26/19  Future Office visit:       Routing refill request to provider for review/approval because:

## 2020-02-26 RX ORDER — LITHIUM CARBONATE 300 MG/1
CAPSULE ORAL
Qty: 180 CAPSULE | Refills: 3 | Status: SHIPPED | OUTPATIENT
Start: 2020-02-26 | End: 2021-02-18

## 2020-04-29 DIAGNOSIS — F43.21 ADJUSTMENT DISORDER WITH DEPRESSED MOOD: ICD-10-CM

## 2020-04-29 RX ORDER — DULOXETIN HYDROCHLORIDE 60 MG/1
CAPSULE, DELAYED RELEASE ORAL
Qty: 90 CAPSULE | Refills: 0 | Status: SHIPPED | OUTPATIENT
Start: 2020-04-29 | End: 2020-07-17

## 2020-04-29 NOTE — TELEPHONE ENCOUNTER
duloxetine      Last Written Prescription Date:  11/26/2019  Last Fill Quantity: 30,   # refills: 0  Last Office Visit: 11/26/2018  Future Office visit:       Routing refill request to provider for review/approval because:   Blood pressure under 140/90 in past 12 months Protocol Details    PHQ-9 score of less than 5 in past 6 months     Recent (6 mo) or future (30 days) visit within the authorizing provider's specialty

## 2020-07-17 DIAGNOSIS — N52.8 OTHER MALE ERECTILE DYSFUNCTION: ICD-10-CM

## 2020-07-17 DIAGNOSIS — F43.21 ADJUSTMENT DISORDER WITH DEPRESSED MOOD: ICD-10-CM

## 2020-07-17 DIAGNOSIS — I10 ESSENTIAL HYPERTENSION WITH GOAL BLOOD PRESSURE LESS THAN 140/90: ICD-10-CM

## 2020-07-17 DIAGNOSIS — N52.9 ERECTILE DYSFUNCTION, UNSPECIFIED ERECTILE DYSFUNCTION TYPE: Primary | ICD-10-CM

## 2020-07-17 DIAGNOSIS — N52.9 ERECTILE DYSFUNCTION: ICD-10-CM

## 2020-07-17 RX ORDER — DULOXETIN HYDROCHLORIDE 60 MG/1
CAPSULE, DELAYED RELEASE ORAL
Qty: 90 CAPSULE | Refills: 0 | Status: SHIPPED | OUTPATIENT
Start: 2020-07-17 | End: 2020-10-15

## 2020-07-17 RX ORDER — SILDENAFIL 100 MG/1
100 TABLET, FILM COATED ORAL DAILY PRN
Qty: 10 TABLET | Refills: 0 | Status: SHIPPED | OUTPATIENT
Start: 2020-07-17

## 2020-07-17 RX ORDER — LISINOPRIL AND HYDROCHLOROTHIAZIDE 20; 25 MG/1; MG/1
1 TABLET ORAL DAILY
Qty: 30 TABLET | Refills: 0 | Status: SHIPPED | OUTPATIENT
Start: 2020-07-17 | End: 2021-03-04

## 2020-07-17 RX ORDER — LISINOPRIL AND HYDROCHLOROTHIAZIDE 20; 25 MG/1; MG/1
1 TABLET ORAL DAILY
Qty: 90 TABLET | Refills: 0 | Status: SHIPPED | OUTPATIENT
Start: 2020-07-17 | End: 2020-10-15

## 2020-10-14 DIAGNOSIS — I10 ESSENTIAL HYPERTENSION WITH GOAL BLOOD PRESSURE LESS THAN 140/90: ICD-10-CM

## 2020-10-14 DIAGNOSIS — F43.21 ADJUSTMENT DISORDER WITH DEPRESSED MOOD: ICD-10-CM

## 2020-10-15 RX ORDER — LISINOPRIL AND HYDROCHLOROTHIAZIDE 20; 25 MG/1; MG/1
TABLET ORAL
Qty: 90 TABLET | Refills: 0 | Status: SHIPPED | OUTPATIENT
Start: 2020-10-15 | End: 2021-01-13

## 2020-10-15 RX ORDER — DULOXETIN HYDROCHLORIDE 60 MG/1
CAPSULE, DELAYED RELEASE ORAL
Qty: 90 CAPSULE | Refills: 0 | Status: SHIPPED | OUTPATIENT
Start: 2020-10-15 | End: 2021-01-13

## 2020-10-15 NOTE — TELEPHONE ENCOUNTER
Cymbalta       Last Written Prescription Date:  7/17/2020  Last Fill Quantity: 90,   # refills: 0    Lisinopril       Last Written Prescription Date:  7/17/2020  Last Fill Quantity: 30,   # refills: 0    Prilosec       Last Written Prescription Date:  7/17/2020  Last Fill Quantity: 30,   # refills: 0  Last Office Visit: 11/26/2018  Future Office visit:

## 2020-11-22 ENCOUNTER — HEALTH MAINTENANCE LETTER (OUTPATIENT)
Age: 70
End: 2020-11-22

## 2021-01-12 DIAGNOSIS — F43.21 ADJUSTMENT DISORDER WITH DEPRESSED MOOD: ICD-10-CM

## 2021-01-12 DIAGNOSIS — I10 ESSENTIAL HYPERTENSION WITH GOAL BLOOD PRESSURE LESS THAN 140/90: ICD-10-CM

## 2021-01-13 RX ORDER — LISINOPRIL AND HYDROCHLOROTHIAZIDE 20; 25 MG/1; MG/1
TABLET ORAL
Qty: 90 TABLET | Refills: 0 | Status: SHIPPED | OUTPATIENT
Start: 2021-01-13 | End: 2021-03-04

## 2021-01-13 RX ORDER — DULOXETIN HYDROCHLORIDE 60 MG/1
CAPSULE, DELAYED RELEASE ORAL
Qty: 90 CAPSULE | Refills: 3 | Status: SHIPPED | OUTPATIENT
Start: 2021-01-13 | End: 2022-01-10

## 2021-01-13 NOTE — TELEPHONE ENCOUNTER
DULoxetine (CYMBALTA) 60 MG capsule      Last Written Prescription Date:  10/15/20  Last Fill Quantity: 90,   # refills: 0  Last Office Visit: 11/26/18  Future Office visit:       omeprazole (PRILOSEC) 20 MG DR capsule      Last Written Prescription Date:  10/15/20  Last Fill Quantity: 90,   # refills: 0  Last Office Visit: 11/26/18  Future Office visit:       lisinopril-hydrochlorothiazide (ZESTORETIC) 20-25 MG tablet    Last Written Prescription Date:  10/15/20  Last Fill Quantity: 90,   # refills: 0  Last Office Visit: 11/26/18  Future Office visit:       Routing refill request to provider for review/approval

## 2021-02-17 DIAGNOSIS — F31.11 BIPOLAR I DISORDER, MOST RECENT EPISODE (OR CURRENT) MANIC, MILD (H): ICD-10-CM

## 2021-02-18 RX ORDER — LITHIUM CARBONATE 300 MG/1
CAPSULE ORAL
Qty: 180 CAPSULE | Refills: 3 | Status: SHIPPED | OUTPATIENT
Start: 2021-02-18 | End: 2021-03-04

## 2021-02-18 NOTE — TELEPHONE ENCOUNTER
LITHIUM CARBONATE CAPS 300MG        Last Written Prescription Date:  2/26/20  Last Fill Quantity: 180,   # refills: 3  Last Office Visit: 11/26/18  Future Office visit:    Next 5 appointments (look out 90 days)    Mar 04, 2021  9:30 AM  (Arrive by 9:15 AM)  PHYSICAL with Glenn Segovia MD  [facility] ([facility] [address]  [place] [address]  [phone]           Routing refill request to provider for review/approval because:    No protocol provided

## 2021-03-01 NOTE — PATIENT INSTRUCTIONS
Preventive Health Recommendations:     See your health care provider every year to    Review health changes.     Discuss preventive care.      Review your medicines if your doctor has prescribed any.      Talk with your health care provider about whether you should have a test to screen for prostate cancer (PSA).    Every 3 years, have a diabetes test (fasting glucose). If you are at risk for diabetes, you should have this test more often.    Every 5 years, have a cholesterol test. Have this test more often if you are at risk for high cholesterol or heart disease.     Every 10 years, have a colonoscopy. Or, have a yearly FIT test (stool test). These exams will check for colon cancer.    Talk to with your health care provider about screening for Abdominal Aortic Aneurysm if you have a family history of AAA or have a history of smoking.    Shots:     Get a flu shot each year.     Get a tetanus shot every 10 years.     Talk to your doctor about your pneumonia vaccines. There are now two you should receive - Pneumovax (PPSV 23) and Prevnar (PCV 13).     Talk to your pharmacist about a shingles vaccine.     Talk to your doctor about the hepatitis B vaccine.  Nutrition:     Eat at least 5 servings of fruits and vegetables each day.     Eat whole-grain bread, whole-wheat pasta and brown rice instead of white grains and rice.     Get adequate Calcium and Vitamin D.   Lifestyle    Exercise for at least 150 minutes a week (30 minutes a day, 5 days a week). This will help you control your weight and prevent disease.     Limit alcohol to one drink per day.     No smoking.     Wear sunscreen to prevent skin cancer.    See your dentist every six months for an exam and cleaning.    See your eye doctor every 1 to 2 years to screen for conditions such as glaucoma, macular degeneration, cataracts, etc.    Personalized Prevention Plan  You are due for the preventive services outlined below.  Your care team is available to assist you  in scheduling these services.  If you have already completed any of these items, please share that information with your care team to update in your medical record.  Health Maintenance Due   Topic Date Due     Zoster (Shingles) Vaccine (1 of 2) 08/25/2000     AORTIC ANEURYSM SCREENING (SYSTEM ASSIGNED)  08/25/2015     Annual Wellness Visit  11/26/2019     FALL RISK ASSESSMENT  11/26/2019     Patient Education     Prediabetes  You have been diagnosed with prediabetes. This means that the level of sugar (glucose) in your blood is too high. If you have prediabetes, you are at risk for developing type 2 diabetes. Type 2 diabetes is diagnosed when the level of glucose in the blood reaches a certain high level. With prediabetes, it hasn t reached this point yet. But it's higher than normal. It is vital to make lifestyle changes to lower your blood sugar, improve your health, and prevent diabetes.       Why worry about prediabetes?  Prediabetes is a condition where the body s cells have trouble using glucose in the blood for energy. As a result, too much glucose stays in the blood. This can affect how your heart and blood vessels work. Without changes in diet and lifestyle, the problem can get worse. Once you have type 2 diabetes, it's ongoing (chronic). It needs to be managed for the rest of your life. Diabetes can harm the body and your health by damaging organs, such as your eyes and kidneys. It makes you more likely to have heart disease. And it can damage nerves and blood vessels.   Who is a risk for prediabetes?  The exact cause of prediabetes is not clear. But certain risk factors make a person more likely to have it. These include:     A family history of type 2 diabetes    Being overweight    Being older than age 45    Have high blood pressure or high cholesterol     Having had gestational diabetes    Not being physically active    Being ,  American, , , ,  or   Diagnosing prediabetes  Prediabetes may have no symptoms. Or you may have some of the symptoms of diabetes (see below). The diagnosis is made with a blood test. You may have 1 or more of these blood tests:      Fasting glucose test. Blood is taken and tested after you have fasted (not eaten) for at least 8 hours. A normal test result is 99 milligrams per deciliter (mg/dL) or lower. Prediabetes is 100 mg/dL to 125 mg/dL. Diabetes is 126 mg/dL or higher.    Glucose tolerance test. Your blood sugar is measured before and after you drink a very sugary liquid. A normal test result is 139 milligrams per deciliter (mg/dL) or lower. Prediabetes is 140 mg/dL to 199 mg/dL. Diabetes is 200 mg/dL or higher.    Hemoglobin A1c (HbA1c). Your HbA1c is normal if it is below 5.7%. Prediabetes is 5.7% to 6.4%. Diabetes is 6.5% or higher.   Treating prediabetes  The best way to treat prediabetes is to lose at least 5% to 7% of your current weight and be more physically active by getting at least 150 minutes a week of physical activity (at least 30 minutes daily.) When sitting for long periods of time, get up for short sessions of light activity every 30 minutes. These changes help the body s cells use blood sugar better. Even a small amount of weight loss can help. Work with your healthcare provider to make a plan to eat well and be more active. Keep in mind that small changes can add up. Other changes in your lifestyle (or even taking certain medicines, such as metformin) may make you less likely to develop diabetes. Your provider can talk with you about these. Stopping smoking will decrease your risk of developing diabetes. Don't use e-cigarettes or vaping products. But you may gain some weight if you are not careful.   Ask your healthcare provider for a referral to a lifestyle intervention program. This program will help you get to and stay at a 7% weight loss and increase physical activity.   Follow-up  If not  treated, prediabetes can turn into diabetes. This is a serious health condition. Take steps to stop this from happening. Follow the treatment plan you have been given. You may have your blood glucose tested again in about 12 to 18 months.   Diabetes symptoms   Let your healthcare provider know if you have any of the following:    Always feel very tired    Feel very thirsty or hungry much of the time    Have to urinate often    Lose weight for no reason    Feel numbness or tingling in your fingers or toes    Have cuts or bruises that don t seem to heal    Have blurry vision  [employer] last reviewed this educational content on 6/1/2019 2000-2020 [employer]. All rights reserved. This information is not intended as a substitute for professional medical care. Always follow your healthcare professional's instructions.           Patient Education     Weight Management: Overcoming Your Barriers    You may have many reasons why you re not ready to lose weight. You may not feel you have the time or the skills. You may be afraid of losing weight and gaining it back again. Well, you can lose weight. And you can keep the weight off, if you make changes slowly and stick with them. Remember that you may never find the perfect time to lose weight. Decide that the right time to be healthier is now.   Common barriers  Barrier 1: I don t want to deny myself.  Barrier Buster: You don t have to! Moderation is the key:    Watch portion sizes and know when you're eating more than one serving.    Plan to ask for a doggy bag when you eat out.    Have just one.    Read the labels on foods to know what foods may be hiding calories or salt.    Choose lower-fat and lower-calorie versions of your favorites.    Use a small plate instead of a normal-sized plate.   Barrier 2: I lost weight before but I gained it right back.  Barrier Buster: Make this time different:    List what worked and didn t work last time and what you can try  this time.    Choose changes that you are willing to stick with.    Work exercise into your weight-loss plan.    Be realistic about what is possible. Your plan has to fit into your life in a balanced way that works for you.   Barrier 3: I don t have the time to be active.  Barrier Buster: It takes just a few minutes a day!    Be active with a pet or the kids.    Block off activity time in your schedule.    Borrow some time that you usually spend watching TV.    You are too important not to take time to exercise--it is your life!   Feel good about yourself  Do you eat more because you feel bad about yourself, then feel even worse as you gain weight? This is a  vicious cycle.  Breaking this cycle is not easy. You may need group support or counseling. Always remember that you are a valuable person, no matter what size or shape you are.   Do you have a health problem? If so, don t use it as an excuse for not losing weight. Ask your healthcare provider or dietitian about methods to lose weight that are safe for you. For example, even if you have severe arthritis, it may be easier for you to exercise in a pool. Get advice from a .    [employer] last reviewed this educational content on 4/1/2018 2000-2020 [employer]. All rights reserved. This information is not intended as a substitute for professional medical care. Always follow your healthcare professional's instructions.           Patient Education     Weight Management: Take It Off and Keep It Off    It s easy to be motivated when you first start. The key is to stay motivated all along the way and to have realistic and achievable goals. There are things you can do to keep yourself on the path to success.   Don t focus on daily weight gains and losses. Instead, weigh yourself no more than once a week at the same time of day. Weighing yourself each day will probably only frustrate you. Find a friend who also needs to lose some weight  and you can encourage each other.   Stay motivated  Here are suggestions to keep you motivated:     Remind yourself of your goals. Post them near the refrigerator or desk where you work.    Make daily entries in your diary or journal about your activity and eating. A visual reminder of success, like a gold star, can help keep you going.    Every week, take time to look back on how much you ve accomplished, and the changes you may have made.    Try taking a nutrition class. It can help you learn new shopping, cooking, and eating skills, and also meet new people. You might try a low-fat cooking or yoga class.    Don t be hard on yourself or give up if you slip. Be patient. Learn from your mistakes and adjust your plan if you need to. Then get right back to it.    Be realistic about your goals. Talk with a dietitian or your healthcare provider about what goals are reasonable for you.   Believe that you can do it  How you think about yourself is just as important as what you do. If you don t think you can succeed, chances are you won t. Believe that you can stick to your plan and meet your goals:     If you don t meet a goal, don t use it as an excuse to give up on your whole plan. Adjust your goal and try again.    Try to understand your own attitude about food.  Are you subject to emotional eating?    Learn how to accept compliments. Even if you get embarrassed, just say  thank you.     Make a list of the things that others like about you and that you like about yourself. Add something new from time to time. Keep this list to look at when you need a lift.  Resources    The [employer] [place] on Fitness, Sports & Nutritionwww.fitness.gov    Academy of Nutrition and Dieteticswww.eatright.org    Healthfinderwww.healthfinder.gov    [employer] last reviewed this educational content on 4/1/2018 2000-2020 [employer]. All rights reserved. This information is not intended as a substitute for professional  medical care. Always follow your healthcare professional's instructions.           Patient Education     Weight Management: Fact and Fiction    Knowing the truth about losing weight can help you separate what works from what doesn t. Don t be taken in by expensive weight-loss fads like pills, herbs, and special foods that promise unbelievable results. There s no magic way to lose weight. If you have questions about weight loss, ask your healthcare provider.  Fiction:  The faster I lose weight, the better.   Fact: Rapid weight loss is usually due to loss of water or muscle mass. What you re trying to get rid of is extra fat. Aim to lose a 1/2 pound to 2 pounds (0.25 kg to 0.9 kg).a week. Then you re more likely to lose fat rather than water or muscle.  Fiction:  Skipping meals will help me lose weight.   Fact: When you skip meals, you don t give your body the energy it needs to work. Hunger makes you more likely to overeat later on. It s best to spread your meals throughout the day. Eat at least three meals a day.  Fiction:  I can t start exercising until I lose weight.   Fact: The sooner you start exercising the better. Exercise helps burn more calories, tone your muscles, and keep your appetite in check. People who continue to exercise after they lose weight are more likely to keep the weight off.  Fiction:  The fewer calories I eat, the better.   Fact: This seems like it should be true, but it s not. When you eat too few calories, your body acts as if it s on a desert island. It thinks food is scarce, so it slows down your metabolism (how fast you burn calories) to save energy. By eating too few calories, you make it harder to lose weight.  Fiction:  Once I lose weight, I can go back to living the way I did before.   Fact: Going back to your old eating habits and giving up exercise is a sure way to regain any weight you ve lost. The lifestyle changes that help you lose extra weight can also help keep it off. This  is why you need to make realistic changes you can stick with.  Fiction:  Low-fat and fat-free mean low-calorie.   Fact: All foods, even fat-free ones, have calories. Eat too many calories and you ll gain weight. It s OK to treat yourself to a fat-free cookie or two. Just don t eat the whole box! A dietitian will help you figure this out, and will likely recommend that you eat three meals a day, with protein with each meal. Learn to read nutrition labels to see what you are really eating.  [employer] last reviewed this educational content on 4/1/2018 2000-2020 [employer]. All rights reserved. This information is not intended as a substitute for professional medical care. Always follow your healthcare professional's instructions.

## 2021-03-01 NOTE — PROGRESS NOTES
"SUBJECTIVE:   Umer Archuleta is a 70 year old male who presents for Preventive Visit.    Pt is here for comprehensive follow up on below issues     Patient has been advised of split billing requirements and indicates understanding: Yes  Are you in the first 12 months of your Medicare Part B coverage?  No    Physical Health:    In general, how would you rate your overall physical health? fair    Outside of work, how many days during the week do you exercise? 4-5 days/week    Outside of work, approximately how many minutes a day do you exercise?15-30 minutes    If you drink alcohol do you typically have >3 drinks per day or >7 drinks per week? No    Do you usually eat at least 4 servings of fruit and vegetables a day, include whole grains & fiber and avoid regularly eating high fat or \"junk\" foods? NO    Do you have any problems taking medications regularly?  No    Do you have any side effects from medications? none    Needs assistance for the following daily activities: no assistance needed    Which of the following safety concerns are present in your home?  none identified     Hearing impairment: No    In the past 6 months, have you been bothered by leaking of urine? no    Mental Health:    In general, how would you rate your overall mental or emotional health? good  PHQ-2 Score:      Do you feel safe in your environment? Yes    Have you ever done Advance Care Planning? (For example, a Health Directive, POLST, or a discussion with a medical provider or your loved ones about your wishes): No, advance care planning information given to patient to review.  Patient declined advance care planning discussion at this time.    Additional concerns to address?  No    Fall risk:  Fallen 2 or more times in the past year?: No  Any fall with injury in the past year?: No    Cognitive Screenin) Repeat 3 items (Leader, Season, Table)    2) Clock draw: NORMAL  3) 3 item recall: Recalls 2 objects   Results: NORMAL clock, 1-2 " items recalled: COGNITIVE IMPAIRMENT LESS LIKELY    Mini-CogTM Copyright AKHIL Haider. Licensed by the author for use in [facility]; reprinted with permission (cory@[facility]). All rights reserved.      Do you have sleep apnea, excessive snoring or daytime drowsiness?: yes        Hypertension Follow-up      Do you check your blood pressure regularly outside of the clinic? Yes     Are you following a low salt diet? Yes    Are your blood pressures ever more than 140 on the top number (systolic) OR more   than 90 on the bottom number (diastolic), for example 140/90? No    Depression and Anxiety Follow-Up    How are you doing with your depression since your last visit? No change    How are you doing with your anxiety since your last visit?  No change    Are you having other symptoms that might be associated with depression or anxiety? No    Have you had a significant life event? No     Do you have any concerns with your use of alcohol or other drugs? No    Social History     Tobacco Use     Smoking status: Former Smoker     Smokeless tobacco: Never Used   Substance Use Topics     Alcohol use: Not on file     Drug use: Not on file     PHQ 7/19/2017 9/11/2017 11/26/2018   PHQ-9 Total Score 4 5 1   Q9: Thoughts of better off dead/self-harm past 2 weeks Not at all Not at all Not at all     KACIE-7 SCORE 7/19/2017 9/11/2017 11/26/2018   Total Score 0 0 1     Last PHQ-9 3/4/2021   1.  Little interest or pleasure in doing things 0   2.  Feeling down, depressed, or hopeless 0   3.  Trouble falling or staying asleep, or sleeping too much 0   4.  Feeling tired or having little energy 0   5.  Poor appetite or overeating 0   6.  Feeling bad about yourself 0   7.  Trouble concentrating 0   8.  Moving slowly or restless 0   Q9: Thoughts of better off dead/self-harm past 2 weeks 0   PHQ-9 Total Score 0   Difficulty at work, home, or with people -     KACIE-7  3/4/2021   1. Feeling nervous, anxious, or on edge 0   2. Not being able  to stop or control worrying 0   3. Worrying too much about different things 0   4. Trouble relaxing 0   5. Being so restless that it is hard to sit still 0   6. Becoming easily annoyed or irritable 0   7. Feeling afraid, as if something awful might happen 0   KACIE-7 Total Score 0   If you checked any problems, how difficult have they made it for you to do your work, take care of things at home, or get along with other people? -       Suicide Assessment Five-step Evaluation and Treatment (SAFE-T)      Reviewed and updated as needed this visit by clinical staff                 Reviewed and updated as needed this visit by Provider                Social History     Tobacco Use     Smoking status: Former Smoker     Smokeless tobacco: Never Used   Substance Use Topics     Alcohol use: Not on file                           Current providers sharing in care for this patient include:   Patient Care Team:  Glenn Segovia MD as PCP - General  Glenn Segovia MD as Assigned PCP    The following health maintenance items are reviewed in Epic and correct as of today:  Health Maintenance   Topic Date Due     ZOSTER IMMUNIZATION (1 of 2) 08/25/2000     AORTIC ANEURYSM SCREENING (SYSTEM ASSIGNED)  08/25/2015     MEDICARE ANNUAL WELLNESS VISIT  11/26/2019     FALL RISK ASSESSMENT  11/26/2019     ADVANCE CARE PLANNING  09/21/2021     DTAP/TDAP/TD IMMUNIZATION (2 - Td) 08/14/2023     COLORECTAL CANCER SCREENING  08/29/2023     LIPID  11/26/2023     HEPATITIS C SCREENING  Completed     INFLUENZA VACCINE  Completed     Pneumococcal Vaccine: 65+ Years  Completed     Pneumococcal Vaccine: Pediatrics (0 to 5 Years) and At-Risk Patients (6 to 64 Years)  Aged Out     IPV IMMUNIZATION  Aged Out     MENINGITIS IMMUNIZATION  Aged Out     HEPATITIS B IMMUNIZATION  Aged Out     Labs reviewed in EPIC      ROS:  Constitutional, HEENT, cardiovascular, pulmonary, GI, , musculoskeletal, neuro, skin, endocrine and psych systems are negative, except  "as otherwise noted.    OBJECTIVE:   /82   Pulse 63   Temp 98.7  F (37.1  C)   Ht 1.727 m (5' 8\")   Wt 108 kg (238 lb)   SpO2 97%   BMI 36.19 kg/m   Estimated body mass index is 35.29 kg/m  as calculated from the following:    Height as of 11/26/18: 1.708 m (5' 7.25\").    Weight as of 11/26/18: 103 kg (227 lb).  EXAM:   GENERAL: healthy, alert and no distress  EYES: Eyes grossly normal to inspection, PERRL and conjunctivae and sclerae normal  HENT: ear canals and TM's normal, nose and mouth without ulcers or lesions  NECK: no adenopathy, no asymmetry, masses, or scars and thyroid normal to palpation  RESP: lungs clear to auscultation - no rales, rhonchi or wheezes  CV: regular rate and rhythm, normal S1 S2, no S3 or S4, no murmur, click or rub, no peripheral edema and peripheral pulses strong  ABDOMEN: soft, nontender, no hepatosplenomegaly, no masses and bowel sounds normal- sliding moderate umbilical hernia    (male): normal male genitalia without lesions or urethral discharge, no hernia-- weaker on right side - not full hernia yet   MS: no gross musculoskeletal defects noted, no edema  SKIN: no suspicious lesions or rashes  NEURO: Normal strength and tone, mentation intact and speech normal  PSYCH: mentation appears normal, affect normal/bright  LYMPH: no cervical, supraclavicular, axillary, or inguinal adenopathy    Results for orders placed or performed in visit on 03/04/21   Lithium level     Status: Abnormal   Result Value Ref Range    Lithium Level 0.37 (L) 0.60 - 1.20 mmol/L   TSH     Status: None   Result Value Ref Range    TSH 2.50 0.40 - 4.00 mU/L   PSA Diagnostic     Status: None   Result Value Ref Range    PSA 0.87 0 - 4 ug/L   Lipid Profile     Status: Abnormal   Result Value Ref Range    Cholesterol 179 <200 mg/dL    Triglycerides 90 <150 mg/dL    HDL Cholesterol 44 >39 mg/dL    LDL Cholesterol Calculated 117 (H) <100 mg/dL    Non HDL Cholesterol 135 (H) <130 mg/dL   Comprehensive " metabolic panel     Status: Abnormal   Result Value Ref Range    Sodium 139 133 - 144 mmol/L    Potassium 4.0 3.4 - 5.3 mmol/L    Chloride 108 94 - 109 mmol/L    Carbon Dioxide 30 20 - 32 mmol/L    Anion Gap 1 (L) 3 - 14 mmol/L    Glucose 120 (H) 70 - 99 mg/dL    Urea Nitrogen 16 7 - 30 mg/dL    Creatinine 1.18 0.66 - 1.25 mg/dL    GFR Estimate 62 >60 mL/min/[1.73_m2]    GFR Estimate If Black 72 >60 mL/min/[1.73_m2]    Calcium 8.9 8.5 - 10.1 mg/dL    Bilirubin Total 0.5 0.2 - 1.3 mg/dL    Albumin 3.7 3.4 - 5.0 g/dL    Protein Total 7.5 6.8 - 8.8 g/dL    Alkaline Phosphatase 65 40 - 150 U/L     (H) 0 - 70 U/L    AST 38 0 - 45 U/L   CBC with platelets and differential     Status: None   Result Value Ref Range    WBC 6.7 4.0 - 11.0 10e9/L    RBC Count 4.52 4.4 - 5.9 10e12/L    Hemoglobin 14.1 13.3 - 17.7 g/dL    Hematocrit 42.2 40.0 - 53.0 %    MCV 93 78 - 100 fl    MCH 31.2 26.5 - 33.0 pg    MCHC 33.4 31.5 - 36.5 g/dL    RDW 14.1 10.0 - 15.0 %    Platelet Count 289 150 - 450 10e9/L    Diff Method Automated Method     % Neutrophils 59.9 %    % Lymphocytes 23.5 %    % Monocytes 9.1 %    % Eosinophils 6.5 %    % Basophils 0.7 %    % Immature Granulocytes 0.3 %    Nucleated RBCs 0 0 /100    Absolute Neutrophil 4.0 1.6 - 8.3 10e9/L    Absolute Lymphocytes 1.6 0.8 - 5.3 10e9/L    Absolute Monocytes 0.6 0.0 - 1.3 10e9/L    Absolute Eosinophils 0.4 0.0 - 0.7 10e9/L    Absolute Basophils 0.1 0.0 - 0.2 10e9/L    Abs Immature Granulocytes 0.0 0 - 0.4 10e9/L    Absolute Nucleated RBC 0.0          ASSESSMENT / PLAN:   1. Essential hypertension with goal blood pressure less than 140/90  Stable on meds. Continue current medications and behavioral changes.   Work on diet and more aerobic exercise   - CBC with platelets differential; Future  - Comprehensive metabolic panel; Future  - Lipid Profile; Future  - US Aorta Medicare AAA Screening; Future  - lisinopril-hydrochlorothiazide (ZESTORETIC) 20-25 MG tablet; Take 1 tablet by  "mouth daily  Dispense: 90 tablet; Refill: 3  - Estimated Average Glucose    2. Nocturia  Stable - psa ok  - PSA tumor marker; Future    3. Bipolar I disorder, single manic episode (H)  Good control. Doing real well.   - TSH; Future  - Lithium level; Future    4. Morbid obesity due to excess calories (H)  Discussed. Needs to move more.  Also prediabetic?  - Lipid Profile; Future    5. Bipolar I disorder, most recent episode (or current) manic, mild (H)  As above. Continue current medications and behavioral changes.   Labs stable   - lithium 300 MG capsule; TAKE 2 CAPSULES DAILY  Dispense: 180 capsule; Refill: 3    6. Prediabetes  Discussed and handouts given   - Hemoglobin A1c    7. Umbilical hernia without obstruction and without gangrene  Asymptomatic. Seen surgeon in past- not ready to get it fixed. Discussed again today.      Patient has been advised of split billing requirements and indicates understanding:     COUNSELING:  Reviewed preventive health counseling, as reflected in patient instructions       Regular exercise       Healthy diet/nutrition       Colon cancer screening       Prostate cancer screening    Estimated body mass index is 35.29 kg/m  as calculated from the following:    Height as of 11/26/18: 1.708 m (5' 7.25\").    Weight as of 11/26/18: 103 kg (227 lb).    Weight management plan: Discussed healthy diet and exercise guidelines    He reports that he has quit smoking. He has never used smokeless tobacco.    Appropriate preventive services were discussed with this patient, including applicable screening as appropriate for cardiovascular disease, diabetes, osteopenia/osteoporosis, and glaucoma.  As appropriate for age/gender, discussed screening for colorectal cancer, prostate cancer, breast cancer, and cervical cancer. Checklist reviewing preventive services available has been given to the patient.    Reviewed patients plan of care and provided an AVS. The Basic Care Plan (routine screening as " documented in Health Maintenance) for Umer meets the Care Plan requirement. This Care Plan has been established and reviewed with the Patient.    Counseling Resources:  ATP IV Guidelines  Pooled Cohorts Equation Calculator  Breast Cancer Risk Calculator  BRCA-Related Cancer Risk Assessment: FHS-7 Tool  FRAX Risk Assessment  ICSI Preventive Guidelines  Dietary Guidelines for Americans, 2010  USDA's MyPlate  ASA Prophylaxis  Lung CA Screening    Glenn Segovia MD  [facility]

## 2021-03-04 ENCOUNTER — OFFICE VISIT (OUTPATIENT)
Dept: FAMILY MEDICINE | Facility: OTHER | Age: 71
End: 2021-03-04
Attending: FAMILY MEDICINE
Payer: MEDICARE

## 2021-03-04 VITALS
WEIGHT: 238 LBS | HEART RATE: 63 BPM | TEMPERATURE: 98.7 F | DIASTOLIC BLOOD PRESSURE: 82 MMHG | BODY MASS INDEX: 36.07 KG/M2 | OXYGEN SATURATION: 97 % | SYSTOLIC BLOOD PRESSURE: 122 MMHG | HEIGHT: 68 IN

## 2021-03-04 DIAGNOSIS — R35.1 NOCTURIA: ICD-10-CM

## 2021-03-04 DIAGNOSIS — F31.11 BIPOLAR I DISORDER, MOST RECENT EPISODE (OR CURRENT) MANIC, MILD (H): ICD-10-CM

## 2021-03-04 DIAGNOSIS — F30.9 BIPOLAR I DISORDER, SINGLE MANIC EPISODE (H): ICD-10-CM

## 2021-03-04 DIAGNOSIS — I10 ESSENTIAL HYPERTENSION WITH GOAL BLOOD PRESSURE LESS THAN 140/90: Primary | ICD-10-CM

## 2021-03-04 DIAGNOSIS — K42.9 UMBILICAL HERNIA WITHOUT OBSTRUCTION AND WITHOUT GANGRENE: ICD-10-CM

## 2021-03-04 DIAGNOSIS — I10 ESSENTIAL HYPERTENSION WITH GOAL BLOOD PRESSURE LESS THAN 140/90: ICD-10-CM

## 2021-03-04 DIAGNOSIS — R73.03 PREDIABETES: ICD-10-CM

## 2021-03-04 DIAGNOSIS — E66.01 MORBID OBESITY DUE TO EXCESS CALORIES (H): ICD-10-CM

## 2021-03-04 LAB
ALBUMIN SERPL-MCNC: 3.7 G/DL (ref 3.4–5)
ALP SERPL-CCNC: 65 U/L (ref 40–150)
ALT SERPL W P-5'-P-CCNC: 101 U/L (ref 0–70)
ANION GAP SERPL CALCULATED.3IONS-SCNC: 1 MMOL/L (ref 3–14)
AST SERPL W P-5'-P-CCNC: 38 U/L (ref 0–45)
BASOPHILS # BLD AUTO: 0.1 10E9/L (ref 0–0.2)
BASOPHILS NFR BLD AUTO: 0.7 %
BILIRUB SERPL-MCNC: 0.5 MG/DL (ref 0.2–1.3)
BUN SERPL-MCNC: 16 MG/DL (ref 7–30)
CALCIUM SERPL-MCNC: 8.9 MG/DL (ref 8.5–10.1)
CHLORIDE SERPL-SCNC: 108 MMOL/L (ref 94–109)
CHOLEST SERPL-MCNC: 179 MG/DL
CO2 SERPL-SCNC: 30 MMOL/L (ref 20–32)
CREAT SERPL-MCNC: 1.18 MG/DL (ref 0.66–1.25)
DIFFERENTIAL METHOD BLD: NORMAL
EOSINOPHIL # BLD AUTO: 0.4 10E9/L (ref 0–0.7)
EOSINOPHIL NFR BLD AUTO: 6.5 %
ERYTHROCYTE [DISTWIDTH] IN BLOOD BY AUTOMATED COUNT: 14.1 % (ref 10–15)
EST. AVERAGE GLUCOSE BLD GHB EST-MCNC: 111 MG/DL
GFR SERPL CREATININE-BSD FRML MDRD: 62 ML/MIN/{1.73_M2}
GLUCOSE SERPL-MCNC: 120 MG/DL (ref 70–99)
HBA1C MFR BLD: 5.5 % (ref 0–5.6)
HCT VFR BLD AUTO: 42.2 % (ref 40–53)
HDLC SERPL-MCNC: 44 MG/DL
HGB BLD-MCNC: 14.1 G/DL (ref 13.3–17.7)
IMM GRANULOCYTES # BLD: 0 10E9/L (ref 0–0.4)
IMM GRANULOCYTES NFR BLD: 0.3 %
LDLC SERPL CALC-MCNC: 117 MG/DL
LITHIUM SERPL-SCNC: 0.37 MMOL/L (ref 0.6–1.2)
LYMPHOCYTES # BLD AUTO: 1.6 10E9/L (ref 0.8–5.3)
LYMPHOCYTES NFR BLD AUTO: 23.5 %
MCH RBC QN AUTO: 31.2 PG (ref 26.5–33)
MCHC RBC AUTO-ENTMCNC: 33.4 G/DL (ref 31.5–36.5)
MCV RBC AUTO: 93 FL (ref 78–100)
MONOCYTES # BLD AUTO: 0.6 10E9/L (ref 0–1.3)
MONOCYTES NFR BLD AUTO: 9.1 %
NEUTROPHILS # BLD AUTO: 4 10E9/L (ref 1.6–8.3)
NEUTROPHILS NFR BLD AUTO: 59.9 %
NONHDLC SERPL-MCNC: 135 MG/DL
NRBC # BLD AUTO: 0 10*3/UL
NRBC BLD AUTO-RTO: 0 /100
PLATELET # BLD AUTO: 289 10E9/L (ref 150–450)
POTASSIUM SERPL-SCNC: 4 MMOL/L (ref 3.4–5.3)
PROT SERPL-MCNC: 7.5 G/DL (ref 6.8–8.8)
PSA SERPL-MCNC: 0.87 UG/L (ref 0–4)
RBC # BLD AUTO: 4.52 10E12/L (ref 4.4–5.9)
SODIUM SERPL-SCNC: 139 MMOL/L (ref 133–144)
TRIGL SERPL-MCNC: 90 MG/DL
TSH SERPL DL<=0.005 MIU/L-ACNC: 2.5 MU/L (ref 0.4–4)
WBC # BLD AUTO: 6.7 10E9/L (ref 4–11)

## 2021-03-04 PROCEDURE — 999N001182 HC STATISTIC ESTIMATED AVERAGE GLUCOSE: Mod: ZL | Performed by: FAMILY MEDICINE

## 2021-03-04 PROCEDURE — 84443 ASSAY THYROID STIM HORMONE: CPT | Mod: ZL | Performed by: FAMILY MEDICINE

## 2021-03-04 PROCEDURE — 80178 ASSAY OF LITHIUM: CPT | Mod: ZL | Performed by: FAMILY MEDICINE

## 2021-03-04 PROCEDURE — 83036 HEMOGLOBIN GLYCOSYLATED A1C: CPT | Mod: ZL | Performed by: FAMILY MEDICINE

## 2021-03-04 PROCEDURE — 36415 COLL VENOUS BLD VENIPUNCTURE: CPT | Mod: ZL | Performed by: FAMILY MEDICINE

## 2021-03-04 PROCEDURE — G0463 HOSPITAL OUTPT CLINIC VISIT: HCPCS

## 2021-03-04 PROCEDURE — 36416 COLLJ CAPILLARY BLOOD SPEC: CPT | Mod: ZL | Performed by: FAMILY MEDICINE

## 2021-03-04 PROCEDURE — 99214 OFFICE O/P EST MOD 30 MIN: CPT | Performed by: FAMILY MEDICINE

## 2021-03-04 PROCEDURE — 80061 LIPID PANEL: CPT | Mod: ZL | Performed by: FAMILY MEDICINE

## 2021-03-04 PROCEDURE — 80053 COMPREHEN METABOLIC PANEL: CPT | Mod: ZL | Performed by: FAMILY MEDICINE

## 2021-03-04 PROCEDURE — 84153 ASSAY OF PSA TOTAL: CPT | Mod: ZL | Performed by: FAMILY MEDICINE

## 2021-03-04 PROCEDURE — 85025 COMPLETE CBC W/AUTO DIFF WBC: CPT | Mod: ZL | Performed by: FAMILY MEDICINE

## 2021-03-04 RX ORDER — LISINOPRIL AND HYDROCHLOROTHIAZIDE 20; 25 MG/1; MG/1
1 TABLET ORAL DAILY
Qty: 90 TABLET | Refills: 3 | Status: SHIPPED | OUTPATIENT
Start: 2021-03-04 | End: 2022-03-16

## 2021-03-04 RX ORDER — LITHIUM CARBONATE 300 MG/1
CAPSULE ORAL
Qty: 180 CAPSULE | Refills: 3 | Status: SHIPPED | OUTPATIENT
Start: 2021-03-04 | End: 2022-04-22

## 2021-03-04 ASSESSMENT — ANXIETY QUESTIONNAIRES
5. BEING SO RESTLESS THAT IT IS HARD TO SIT STILL: NOT AT ALL
3. WORRYING TOO MUCH ABOUT DIFFERENT THINGS: NOT AT ALL
4. TROUBLE RELAXING: NOT AT ALL
2. NOT BEING ABLE TO STOP OR CONTROL WORRYING: NOT AT ALL
6. BECOMING EASILY ANNOYED OR IRRITABLE: NOT AT ALL
1. FEELING NERVOUS, ANXIOUS, OR ON EDGE: NOT AT ALL
GAD7 TOTAL SCORE: 0
7. FEELING AFRAID AS IF SOMETHING AWFUL MIGHT HAPPEN: NOT AT ALL

## 2021-03-04 ASSESSMENT — MIFFLIN-ST. JEOR: SCORE: 1814.06

## 2021-03-04 ASSESSMENT — PATIENT HEALTH QUESTIONNAIRE - PHQ9: SUM OF ALL RESPONSES TO PHQ QUESTIONS 1-9: 0

## 2021-03-04 ASSESSMENT — PAIN SCALES - GENERAL: PAINLEVEL: NO PAIN (0)

## 2021-03-04 NOTE — NURSING NOTE
"Chief Complaint   Patient presents with     Physical       Initial /82   Pulse 63   Temp 98.7  F (37.1  C)   Ht 1.727 m (5' 8\")   Wt 108 kg (238 lb)   SpO2 97%   BMI 36.19 kg/m   Estimated body mass index is 36.19 kg/m  as calculated from the following:    Height as of this encounter: 1.727 m (5' 8\").    Weight as of this encounter: 108 kg (238 lb).  Medication Reconciliation: complete  Estuardo Graham LPN  "

## 2021-03-05 ASSESSMENT — ANXIETY QUESTIONNAIRES: GAD7 TOTAL SCORE: 0

## 2021-03-10 ENCOUNTER — MEDICAL CORRESPONDENCE (OUTPATIENT)
Dept: HEALTH INFORMATION MANAGEMENT | Facility: CLINIC | Age: 71
End: 2021-03-10

## 2021-03-10 ENCOUNTER — HOSPITAL ENCOUNTER (OUTPATIENT)
Dept: ULTRASOUND IMAGING | Facility: HOSPITAL | Age: 71
Discharge: HOME | End: 2021-03-10
Attending: FAMILY MEDICINE | Admitting: FAMILY MEDICINE
Payer: MEDICARE

## 2021-03-10 DIAGNOSIS — I10 ESSENTIAL HYPERTENSION WITH GOAL BLOOD PRESSURE LESS THAN 140/90: ICD-10-CM

## 2021-03-10 PROCEDURE — 76706 US ABDL AORTA SCREEN AAA: CPT

## 2021-09-19 ENCOUNTER — HEALTH MAINTENANCE LETTER (OUTPATIENT)
Age: 71
End: 2021-09-19

## 2021-10-26 ENCOUNTER — TELEPHONE (OUTPATIENT)
Dept: FAMILY MEDICINE | Facility: OTHER | Age: 71
End: 2021-10-26

## 2021-10-26 NOTE — TELEPHONE ENCOUNTER
Next 5 appointments (look out 90 days)      Oct 29, 2021  1:30 PM  (Arrive by 1:15 PM)  SHORT with ASHWINI Jacobo CNP  [facility] [place] ([facility] [place] ) [address]  [place] [address]  [phone]          Patient notified

## 2021-10-26 NOTE — TELEPHONE ENCOUNTER
9:37 AM    Reason for Call: OVERBOOK    Patient is having the following symptoms: Gout in big toe for 2-3 days.    The patient is requesting an appointment for ASAP with Any Available provider    Was an appointment offered for this call? No  If yes : Appointment type              Date    Preferred method for responding to this message: Telephone Call  What is your phone number ? [phone]    If we cannot reach you directly, may we leave a detailed response at the number you provided? Yes    Can this message wait until your PCP/provider returns, if unavailable today? No, pt would like to see any available provider    Maribell Galaviz

## 2021-10-28 NOTE — PROGRESS NOTES
"  Assessment & Plan     Acute idiopathic gout involving toe of right foot  Resent prescription to local pharmacy  Uric acid level elevated.  He is borderline diabetes.  Last A1c normal, last used ibuprofen 800 mg last night with minimal relief.  Discussed options - prednisone prescribed discussed medication and treatment   - Uric acid; Future  - CBC with platelets and differential; Future  - Uric acid  - CBC with platelets and differential  - predniSONE (DELTASONE) 20 MG tablet; Take 1 tablet (20 mg) by mouth 2 times daily for 5 days  - predniSONE (DELTASONE) 20 MG tablet; Take 1 tablet (20 mg) by mouth 2 times daily for 5 days         BMI:   Estimated body mass index is 37.86 kg/m  as calculated from the following:    Height as of this encounter: 1.695 m (5' 6.73\").    Weight as of this encounter: 108.8 kg (239 lb 12.8 oz).       See Patient Instructions    No follow-ups on file.    ASHWINI Alvarez [facility] - [place] Colon is a 71 year old who presents for the following health issues     HPI     Gout/ Single Inflamed Joint  Onset/Duration: 10/24/2021  Description:   Location: big toe - right  Joint Swelling: no  Redness: no  Pain: YES- 7/10  Intensity: moderate, severe  Progression of Symptoms: worsening  Accompanying Signs & Symptoms:  Fevers: no  History:   Trauma to the area: no  Previous history of gout: no  Recent illness: no  Alcohol use: YES- Rare  Diuretic use: YES- Zestoric  Precipitating or alleviating factors: None  Therapies tried and outcome: Ibuprofen - improved short term worsen today         Review of Systems   CONSTITUTIONAL: NEGATIVE for fever, chills, change in weight  INTEGUMENTARY/SKIN: NEGATIVE for worrisome rashes, moles or lesions  RESP: NEGATIVE for significant cough or SOB  CV: NEGATIVE for chest pain, palpitations or peripheral edema  MUSCULOSKELETAL: right great to painful joint       Objective    /74   Pulse 72   Temp 98.3  F (36.8 " " C)   Resp 18   Ht 1.695 m (5' 6.73\")   Wt 108.8 kg (239 lb 12.8 oz)   SpO2 95%   BMI 37.86 kg/m    Body mass index is 37.86 kg/m .  Physical Exam   GENERAL: alert and no distress  RESP: lungs clear to auscultation - no rales, rhonchi or wheezes  CV: regular rate and rhythm, normal S1 S2, no S3 or S4, no murmur, click or rub, no peripheral edema and peripheral pulses strong  MS: tenderness right great toe at MT joint   SKIN: no suspicious lesions or rashes  PSYCH: mentation appears normal, affect normal/bright    Results for orders placed or performed in visit on 10/29/21   Uric acid     Status: Abnormal   Result Value Ref Range    Uric Acid 7.9 (H) 3.5 - 7.2 mg/dL   CBC with platelets and differential     Status: None   Result Value Ref Range    WBC Count 8.4 4.0 - 11.0 10e3/uL    RBC Count 5.06 4.40 - 5.90 10e6/uL    Hemoglobin 15.9 13.3 - 17.7 g/dL    Hematocrit 47.4 40.0 - 53.0 %    MCV 94 78 - 100 fL    MCH 31.4 26.5 - 33.0 pg    MCHC 33.5 31.5 - 36.5 g/dL    RDW 13.4 10.0 - 15.0 %    Platelet Count 279 150 - 450 10e3/uL    % Neutrophils 64 %    % Lymphocytes 21 %    % Monocytes 8 %    % Eosinophils 6 %    % Basophils 1 %    % Immature Granulocytes 0 %    NRBCs per 100 WBC 0 <1 /100    Absolute Neutrophils 5.4 1.6 - 8.3 10e3/uL    Absolute Lymphocytes 1.8 0.8 - 5.3 10e3/uL    Absolute Monocytes 0.7 0.0 - 1.3 10e3/uL    Absolute Eosinophils 0.5 0.0 - 0.7 10e3/uL    Absolute Basophils 0.1 0.0 - 0.2 10e3/uL    Absolute Immature Granulocytes 0.0 <=0.0 10e3/uL    Absolute NRBCs 0.0 10e3/uL   CBC with platelets and differential     Status: None    Narrative    The following orders were created for panel order CBC with platelets and differential.  Procedure                               Abnormality         Status                     ---------                               -----------         ------                     CBC with platelets and d...[[id number]]                      Final result                 Please " view results for these tests on the individual orders.

## 2021-10-29 ENCOUNTER — OFFICE VISIT (OUTPATIENT)
Dept: FAMILY MEDICINE | Facility: OTHER | Age: 71
End: 2021-10-29
Attending: NURSE PRACTITIONER
Payer: MEDICARE

## 2021-10-29 VITALS
HEART RATE: 72 BPM | DIASTOLIC BLOOD PRESSURE: 74 MMHG | WEIGHT: 239.8 LBS | TEMPERATURE: 98.3 F | OXYGEN SATURATION: 95 % | RESPIRATION RATE: 18 BRPM | BODY MASS INDEX: 37.64 KG/M2 | HEIGHT: 67 IN | SYSTOLIC BLOOD PRESSURE: 106 MMHG

## 2021-10-29 DIAGNOSIS — M10.071 ACUTE IDIOPATHIC GOUT INVOLVING TOE OF RIGHT FOOT: Primary | ICD-10-CM

## 2021-10-29 LAB
BASOPHILS # BLD AUTO: 0.1 10E3/UL (ref 0–0.2)
BASOPHILS NFR BLD AUTO: 1 %
EOSINOPHIL # BLD AUTO: 0.5 10E3/UL (ref 0–0.7)
EOSINOPHIL NFR BLD AUTO: 6 %
ERYTHROCYTE [DISTWIDTH] IN BLOOD BY AUTOMATED COUNT: 13.4 % (ref 10–15)
HCT VFR BLD AUTO: 47.4 % (ref 40–53)
HGB BLD-MCNC: 15.9 G/DL (ref 13.3–17.7)
IMM GRANULOCYTES # BLD: 0 10E3/UL
IMM GRANULOCYTES NFR BLD: 0 %
LYMPHOCYTES # BLD AUTO: 1.8 10E3/UL (ref 0.8–5.3)
LYMPHOCYTES NFR BLD AUTO: 21 %
MCH RBC QN AUTO: 31.4 PG (ref 26.5–33)
MCHC RBC AUTO-ENTMCNC: 33.5 G/DL (ref 31.5–36.5)
MCV RBC AUTO: 94 FL (ref 78–100)
MONOCYTES # BLD AUTO: 0.7 10E3/UL (ref 0–1.3)
MONOCYTES NFR BLD AUTO: 8 %
NEUTROPHILS # BLD AUTO: 5.4 10E3/UL (ref 1.6–8.3)
NEUTROPHILS NFR BLD AUTO: 64 %
NRBC # BLD AUTO: 0 10E3/UL
NRBC BLD AUTO-RTO: 0 /100
PLATELET # BLD AUTO: 279 10E3/UL (ref 150–450)
RBC # BLD AUTO: 5.06 10E6/UL (ref 4.4–5.9)
URATE SERPL-MCNC: 7.9 MG/DL (ref 3.5–7.2)
WBC # BLD AUTO: 8.4 10E3/UL (ref 4–11)

## 2021-10-29 PROCEDURE — 85025 COMPLETE CBC W/AUTO DIFF WBC: CPT | Mod: ZL | Performed by: NURSE PRACTITIONER

## 2021-10-29 PROCEDURE — G0463 HOSPITAL OUTPT CLINIC VISIT: HCPCS

## 2021-10-29 PROCEDURE — 99213 OFFICE O/P EST LOW 20 MIN: CPT | Performed by: NURSE PRACTITIONER

## 2021-10-29 PROCEDURE — 36415 COLL VENOUS BLD VENIPUNCTURE: CPT | Mod: ZL | Performed by: NURSE PRACTITIONER

## 2021-10-29 PROCEDURE — 84550 ASSAY OF BLOOD/URIC ACID: CPT | Mod: ZL | Performed by: NURSE PRACTITIONER

## 2021-10-29 RX ORDER — PREDNISONE 20 MG/1
20 TABLET ORAL 2 TIMES DAILY
Qty: 10 TABLET | Refills: 0 | Status: SHIPPED | OUTPATIENT
Start: 2021-10-29 | End: 2021-11-03

## 2021-10-29 ASSESSMENT — MIFFLIN-ST. JEOR: SCORE: 1797.1

## 2021-10-29 ASSESSMENT — PAIN SCALES - GENERAL: PAINLEVEL: SEVERE PAIN (7)

## 2021-10-29 NOTE — NURSING NOTE
"Chief Complaint   Patient presents with     Arthritis     Physical       Initial /74   Pulse 72   Temp 98.3  F (36.8  C)   Resp 18   Ht 1.695 m (5' 6.73\")   Wt 108.8 kg (239 lb 12.8 oz)   SpO2 95%   BMI 37.86 kg/m   Estimated body mass index is 37.86 kg/m  as calculated from the following:    Height as of this encounter: 1.695 m (5' 6.73\").    Weight as of this encounter: 108.8 kg (239 lb 12.8 oz).  Medication Reconciliation: marcial Snow  "

## 2021-10-29 NOTE — PATIENT INSTRUCTIONS
Patient Education     Treating Gout Attacks     Raising the joint above the level of your heart can help reduce gout symptoms.     Gout is a disease that affects the joints. It is caused by excess uric acid in your blood that may lead to crystals forming in your joints. Left untreated, it can lead to painful foot and joint deformities and even kidney problems. But, by treating gout early, you can relieve pain and help prevent future problems. Gout can usually be treated with medicine and proper diet. In severe cases, surgery may be needed.  Gout attacks are painful and often happen more than once. Taking medicines may reduce pain and prevent attacks in the future. There are also some things you can do at home to relieve symptoms.  Medicines for gout  Your healthcare provider may prescribe a daily medicine to reduce levels of uric acid. Reducing your uric acid levels may help prevent gout attacks. Allopurinol is one commonly used medicine taken daily to reduce uric acid levels. Other daily medicines used to reduce uric acid levels include febuxostat, lesinurad, and probencid. Other medicines can help relieve pain and swelling during an acute attack. Medicines such as NSAIDs (nonsteroidal anti-inflammatory medicines), steroids, and colchicine may be prescribed for intermittent use to relieve an acute gout attack. Be sure to take your medicine as directed.  What you can do  Below are some things you can do at home to relieve gout symptoms. Your healthcare provider may have other tips.    Rest the painful joint as much as you can.    Raise the painful joint so it is at a level higher than your heart.    Use ice for 10 minutes every 1 to 2 hours as possible.  How can I prevent gout?  With a little effort, you may be able to prevent gout attacks in the future. Here are some things you can do:    Don't eat foods high in purines  ? Certain meats (red meat, processed meat, turkey)  ? Organ meats (kidney, liver,  sweetbread)  ? Shellfish (lobster, crab, shrimp, scallop, mussel)  ? Certain fish (anchovy, sardine, herring, mackerel)    Take any medicines prescribed by your healthcare provider.    Lose weight if you need to.    Reduce high fructose corn syrup in meals and drinks.    Reduce or cut out alcohol, particularly beer, but also red wine and spirits.    Control blood pressure, diabetes, and cholesterol.    Drink plenty of water to help flush uric acid from your body.  [employer] last reviewed this educational content on 4/1/2018 2000-2021 [employer]. All rights reserved. This information is not intended as a substitute for professional medical care. Always follow your healthcare professional's instructions.         Patient Education     Prednisone Oral Tablet 20 mg  Uses  This medicine is used for the following purposes:    allergic reaction    autoimmune disorder    blood disorder    endocrine disorder    inflammatory disease    immune suppression    cancer    COVID-19 (coronavirus)  Instructions  Take the medicine with food.  You may take with food to prevent stomach upset.  Store at room temperature in a dry place. Do not keep in the bathroom.  Keep the medicine away from heat and light.  It is important that you keep taking each dose of this medicine on time even if you are feeling well.  If you forget to take a dose on time, take it as soon as you remember. If it is almost time for the next dose, do not take the missed dose. Return to your normal dosing schedule. Do not take 2 doses of this medicine at one time.  Please tell your doctor and pharmacist about all the medicines you take. Include both prescription and over-the-counter medicines. Also tell them about any vitamins, herbal medicines, or anything else you take for your health.  If your symptoms do not improve or they worsen while on this medicine, contact your doctor.  Do not suddenly stop taking this medicine. Check with your doctor before  stopping.  This medicine may affect your blood sugar levels. If you have diabetes, talk to your doctor before changing the dose of your diabetes medicine.  This medicine may affect the strength of your bones. If you have or are at increased risk for developing osteoporosis (weakening of the bones), your doctor may recommend adding foods containing calcium and vitamin D while on this medicine. Please talk to your doctor for more information.  It is very important that you keep all appointments for medical exams and tests while on this medicine.  Cautions  Tell your doctor and pharmacist if you ever had an allergic reaction to a medicine. Symptoms of an allergic reaction can include trouble breathing, skin rash, itching, swelling, or severe dizziness.  This medicine may cause serious bleeding from the stomach or bowels. Stop this medicine and call your doctor immediately if you see any signs of bleeding. Bleeding can cause pain in the stomach, vomiting up liquid that looks like coffee grounds, and red or dark tarry stools.  Do not use the medication any more than instructed.  Speak with your doctor before taking any medicine with aspirin.  Please check with your doctor before drinking alcohol while on this medicine.  This medicine may reduce your body's ability to fight infections. Try to avoid contact with people with colds, flu or other infections.  Speak with your health care provider before receiving any vaccinations.  Tell the doctor or pharmacist if you are pregnant, planning to be pregnant, or breastfeeding.  Ask your pharmacist if this medicine can interact with any of your other medicines. Be sure to tell them about all the medicines you take.  Please tell all your doctors and dentists that you are on this medicine before they provide care.  Do not start or stop any other medicines without first speaking to your doctor or pharmacist.  Do not share this medicine with anyone who has not been prescribed this  medicine.  Side Effects  The following is a list of some common side effects from this medicine. Please speak with your doctor about what you should do if you experience these or other side effects.    agitated feeling or trouble sleeping    increased appetite    increased risk of bleeding    high blood sugar    high blood pressure    increased risk for an infection    nausea    stomach upset or abdominal pain    vomiting  Call your doctor or get medical help right away if you notice any of these more serious side effects:    bone pain    unusual bruising or discoloration on skin    changes in memory, mood, or thinking    depression or feeling sad    swelling of the legs, feet, and hands    menstruation changes (missed or fewer periods)    mood changes    thinning of the skin    unusual or unexplained tiredness or weakness    blurring or changes of vision    sudden or unexplained weight gain  A few people may have an allergic reactions to this medicine. Symptoms can include difficulty breathing, skin rash, itching, swelling, or severe dizziness. If you notice any of these symptoms, seek medical help quickly.  Extra  Please speak with your doctor, nurse, or pharmacist if you have any questions about this medicine.  [email or web address]  IMPORTANT NOTE: This document tells you briefly how to take your medicine, but it does not tell you all there is to know about it.Your doctor or pharmacist may give you other documents about your medicine. Please talk to them if you have any questions.Always follow their advice. There is a more complete description of this medicine available in English.Scan this code on your smartphone or tablet or use the web address below. You can also ask your pharmacist for a printout. If you have any questions, please ask your pharmacist.     2021 [employer].

## 2021-12-27 ENCOUNTER — TELEPHONE (OUTPATIENT)
Dept: FAMILY MEDICINE | Facility: OTHER | Age: 71
End: 2021-12-27
Payer: COMMERCIAL

## 2021-12-27 NOTE — TELEPHONE ENCOUNTER
To: Nurse to Dr. Segovia  Please call patient back as he would like to be worked into Dr. Segovia's  schedule (sooner than next available) for a chest xray.  Thank you

## 2021-12-28 ENCOUNTER — ANCILLARY PROCEDURE (OUTPATIENT)
Dept: GENERAL RADIOLOGY | Facility: OTHER | Age: 71
End: 2021-12-28
Attending: FAMILY MEDICINE
Payer: MEDICARE

## 2021-12-28 ENCOUNTER — OFFICE VISIT (OUTPATIENT)
Dept: FAMILY MEDICINE | Facility: OTHER | Age: 71
End: 2021-12-28
Attending: FAMILY MEDICINE
Payer: MEDICARE

## 2021-12-28 VITALS
HEART RATE: 71 BPM | OXYGEN SATURATION: 97 % | WEIGHT: 248 LBS | BODY MASS INDEX: 38.92 KG/M2 | HEIGHT: 67 IN | SYSTOLIC BLOOD PRESSURE: 110 MMHG | TEMPERATURE: 98.4 F | DIASTOLIC BLOOD PRESSURE: 74 MMHG

## 2021-12-28 DIAGNOSIS — R09.82 POST-NASAL DRIP: ICD-10-CM

## 2021-12-28 DIAGNOSIS — J40 BRONCHITIS: Primary | ICD-10-CM

## 2021-12-28 DIAGNOSIS — J40 BRONCHITIS: ICD-10-CM

## 2021-12-28 DIAGNOSIS — J98.01 ACUTE BRONCHOSPASM: ICD-10-CM

## 2021-12-28 PROCEDURE — 71046 X-RAY EXAM CHEST 2 VIEWS: CPT | Mod: TC

## 2021-12-28 PROCEDURE — 99213 OFFICE O/P EST LOW 20 MIN: CPT | Performed by: FAMILY MEDICINE

## 2021-12-28 PROCEDURE — G0463 HOSPITAL OUTPT CLINIC VISIT: HCPCS

## 2021-12-28 RX ORDER — PREDNISONE 20 MG/1
20 TABLET ORAL DAILY
Qty: 5 TABLET | Refills: 0 | Status: SHIPPED | OUTPATIENT
Start: 2021-12-28 | End: 2022-04-06

## 2021-12-28 RX ORDER — AZITHROMYCIN 250 MG/1
TABLET, FILM COATED ORAL
Qty: 6 TABLET | Refills: 0 | Status: SHIPPED | OUTPATIENT
Start: 2021-12-28 | End: 2022-04-06

## 2021-12-28 RX ORDER — FLUTICASONE PROPIONATE 50 MCG
2 SPRAY, SUSPENSION (ML) NASAL DAILY
Qty: 16 G | Refills: 11 | Status: SHIPPED | OUTPATIENT
Start: 2021-12-28 | End: 2023-07-19

## 2021-12-28 RX ORDER — CETIRIZINE HYDROCHLORIDE 10 MG/1
10 TABLET ORAL DAILY
COMMUNITY
End: 2023-07-19

## 2021-12-28 ASSESSMENT — MIFFLIN-ST. JEOR: SCORE: 1834.26

## 2021-12-28 ASSESSMENT — PAIN SCALES - GENERAL: PAINLEVEL: SEVERE PAIN (7)

## 2021-12-28 NOTE — PROGRESS NOTES
"  Assessment & Plan       ICD-10-CM    1. Bronchitis  J40 XR Chest 2 Views     azithromycin (ZITHROMAX) 250 MG tablet     predniSONE (DELTASONE) 20 MG tablet   2. Acute bronchospasm  J98.01 azithromycin (ZITHROMAX) 250 MG tablet     predniSONE (DELTASONE) 20 MG tablet   3. Post-nasal drip  R09.82 fluticasone (FLONASE) 50 MCG/ACT nasal spray   Will treat with the above. Hold on flu shot since gave steroids. Add Flonase for chronic drip. Follow-up in January for Px.  Symptomatic treatment was discussed along what is available for OTC medications for symptomatic relief.   Symptomatic treatment was discussed along when patient should call and/or come back into the clinic or go to ER/Urgent care. All questions answered.              BMI:   Estimated body mass index is 39.16 kg/m  as calculated from the following:    Height as of this encounter: 1.695 m (5' 6.73\").    Weight as of this encounter: 112.5 kg (248 lb).           No follow-ups on file.    Glenn Segovia MD  [facility] - RUPERT Colon is a 71 year old who presents for the following health issues     HPI     Acute Illness  Acute illness concerns: cough  Onset/Duration: weeks- several weeks   Symptoms:  Fever: no  Chills/Sweats: no  Headache (location?): no  Sinus Pressure: YES  Conjunctivitis:  no  Ear Pain: no  Rhinorrhea: YES  Congestion: YES  Sore Throat: YES  Cough: YES-non-productive  Wheeze: no  Decreased Appetite: no  Nausea: no  Vomiting: no  Diarrhea: no  Dysuria/Freq.: no  Dysuria or Hematuria: no  Fatigue/Achiness: YES  Sick/Strep Exposure: no  Therapies tried and outcome: None    Very harsh cough  H/o smoking   Mostly dry cough   vaccinated to covid   Does have lots of chronic PND    Review of Systems   Constitutional, HEENT, cardiovascular, pulmonary, gi and gu systems are negative, except as otherwise noted.      Objective    /74   Pulse 71   Temp 98.4  F (36.9  C)   Ht 1.695 m (5' 6.73\")   Wt 112.5 kg (248 " lb)   SpO2 97%   BMI 39.16 kg/m    There is no height or weight on file to calculate BMI.  Physical Exam   GENERAL: healthy, alert and no distress  EYES: Eyes grossly normal to inspection, PERRL and conjunctivae and sclerae normal  HENT: ear canals and TM's normal, nose and mouth without ulcers or lesions  NECK: no adenopathy, no asymmetry, masses, or scars and thyroid normal to palpation  RESP: lungs clear to auscultation - no rales, rhonchi or wheezes  CV: regular rate and rhythm, normal S1 S2, no S3 or S4, no murmur, click or rub, no peripheral edema and peripheral pulses strong  MS: no gross musculoskeletal defects noted, no edema

## 2021-12-28 NOTE — NURSING NOTE
"Chief Complaint   Patient presents with     Cough       Initial /74   Pulse 71   Temp 98.4  F (36.9  C)   Ht 1.695 m (5' 6.73\")   Wt 112.5 kg (248 lb)   SpO2 97%   BMI 39.16 kg/m   Estimated body mass index is 39.16 kg/m  as calculated from the following:    Height as of this encounter: 1.695 m (5' 6.73\").    Weight as of this encounter: 112.5 kg (248 lb).  Medication Reconciliation: complete  Estuardo Graham LPN  "

## 2022-01-09 DIAGNOSIS — F43.21 ADJUSTMENT DISORDER WITH DEPRESSED MOOD: ICD-10-CM

## 2022-01-10 RX ORDER — DULOXETIN HYDROCHLORIDE 60 MG/1
CAPSULE, DELAYED RELEASE ORAL
Qty: 90 CAPSULE | Refills: 1 | Status: SHIPPED | OUTPATIENT
Start: 2022-01-10 | End: 2022-10-17

## 2022-01-10 NOTE — TELEPHONE ENCOUNTER
Prilosec       Last Written Prescription Date:  1/13/2021  Last Fill Quantity: 90,   # refills: 3    Cymbalta       Last Written Prescription Date:  1/13/2021  Last Fill Quantity: 90,   # refills: 3  Last Office Visit: 12/28/2021  Future Office visit:

## 2022-03-16 DIAGNOSIS — I10 ESSENTIAL HYPERTENSION WITH GOAL BLOOD PRESSURE LESS THAN 140/90: ICD-10-CM

## 2022-03-16 RX ORDER — LISINOPRIL AND HYDROCHLOROTHIAZIDE 20; 25 MG/1; MG/1
TABLET ORAL
Qty: 90 TABLET | Refills: 3 | Status: SHIPPED | OUTPATIENT
Start: 2022-03-16 | End: 2022-04-06

## 2022-03-16 NOTE — TELEPHONE ENCOUNTER
Zestoretic      Last Written Prescription Date:  3/4/21  Last Fill Quantity: 90,   # refills: 3  Last Office Visit: 12/28/21  Future Office visit:       Routing refill request to provider for review/approval because:

## 2022-04-06 ENCOUNTER — MYC MEDICAL ADVICE (OUTPATIENT)
Dept: FAMILY MEDICINE | Facility: OTHER | Age: 72
End: 2022-04-06
Payer: COMMERCIAL

## 2022-04-06 ENCOUNTER — VIRTUAL VISIT (OUTPATIENT)
Dept: FAMILY MEDICINE | Facility: OTHER | Age: 72
End: 2022-04-06
Attending: FAMILY MEDICINE
Payer: MEDICARE

## 2022-04-06 DIAGNOSIS — T46.4X5A ACE-INHIBITOR COUGH: ICD-10-CM

## 2022-04-06 DIAGNOSIS — I10 ESSENTIAL HYPERTENSION WITH GOAL BLOOD PRESSURE LESS THAN 140/90: Primary | ICD-10-CM

## 2022-04-06 DIAGNOSIS — R05.8 ACE-INHIBITOR COUGH: ICD-10-CM

## 2022-04-06 PROCEDURE — 99442 PR PHYSICIAN TELEPHONE EVALUATION 11-20 MIN: CPT | Mod: 95 | Performed by: FAMILY MEDICINE

## 2022-04-06 RX ORDER — LOSARTAN POTASSIUM AND HYDROCHLOROTHIAZIDE 12.5; 5 MG/1; MG/1
1 TABLET ORAL DAILY
Qty: 60 TABLET | Refills: 1 | Status: SHIPPED | OUTPATIENT
Start: 2022-04-06 | End: 2022-06-07

## 2022-04-06 ASSESSMENT — PAIN SCALES - GENERAL: PAINLEVEL: NO PAIN (0)

## 2022-04-06 NOTE — PROGRESS NOTES
"Isaiah is a 71 year old who is being evaluated via a billable telephone visit.      What phone number would you like to be contacted at? [phone]  How would you like to obtain your AVS? MyChart    Assessment & Plan       ICD-10-CM    1. Essential hypertension with goal blood pressure less than 140/90  I10 losartan-hydrochlorothiazide (HYZAAR) 50-12.5 MG tablet   2. ACE-inhibitor cough  R05.8 losartan-hydrochlorothiazide (HYZAAR) 50-12.5 MG tablet    T46.4X5A    ???ACE cough.  Sounds like it and not allergies.   Stop Zestoretic.   Start Hyzaar  Patient was instructed to check blood pressure 1-2 times per week until I see back in the Clinic. If blood pressure gettng too high or too low as discussed then need to see patient back sooner.   Follow-up in 4-5 weeks  Pt aware of instructions                 BMI:   Estimated body mass index is 39.16 kg/m  as calculated from the following:    Height as of 12/28/21: 1.695 m (5' 6.73\").    Weight as of 12/28/21: 112.5 kg (248 lb).           Return in about 4 weeks (around 5/4/2022).    Glenn Segovia MD  [facility] - [place]    Subjective   Isaiah is a 71 year old who presents for the following health issues     HPI     Concern - coughing  Onset: years  Description: non productive cough due to post nasal drip  Intensity: moderate, severe  Progression of Symptoms:  Intermittent, more severe  Accompanying Signs & Symptoms: none  Previous history of similar problem: none  Precipitating factors:        Worsened by: none  Alleviating factors:        Improved by: none  Therapies tried and outcome: has tried different medications in the past    Coughing all the time - wife can not stand it anymore   Has seasonal allergies but year around   flonase not help -- one bottles worth only   Zyrtec not help   Feels like drainage   All day and night     Tickle -- non- productive     Wants to try Atrovent          Review of Systems   Constitutional, HEENT, cardiovascular, " pulmonary, gi and gu systems are negative, except as otherwise noted.      Objective    Vitals - Patient Reported  Pain Score: No Pain (0)        Physical Exam   healthy, alert and no distress  PSYCH: Alert and oriented times 3; coherent speech, normal   rate and volume, able to articulate logical thoughts, able   to abstract reason, no tangential thoughts, no hallucinations   or delusions  His affect is normal  RESP: No cough, no audible wheezing, able to talk in full sentences  Remainder of exam unable to be completed due to telephone visits                Phone call duration: 12 minutes

## 2022-04-06 NOTE — NURSING NOTE
"Chief Complaint   Patient presents with     Medication Question       Initial There were no vitals taken for this visit. Estimated body mass index is 39.16 kg/m  as calculated from the following:    Height as of 12/28/21: 1.695 m (5' 6.73\").    Weight as of 12/28/21: 112.5 kg (248 lb).  Medication Reconciliation: complete  Malu Almanza LPN  "

## 2022-04-06 NOTE — PATIENT INSTRUCTIONS
Stop Zestoretic.   Start Hyzaar  Patient was instructed to check blood pressure 1-2 times per week until I see back in the Clinic. If blood pressure gettng too high or too low as discussed then need to see patient back sooner.   Follow-up in 4-5 weeks  Pt aware of instructions

## 2022-04-20 DIAGNOSIS — F31.11 BIPOLAR I DISORDER, MOST RECENT EPISODE (OR CURRENT) MANIC, MILD (H): ICD-10-CM

## 2022-04-22 RX ORDER — LITHIUM CARBONATE 300 MG/1
CAPSULE ORAL
Qty: 180 CAPSULE | Refills: 3 | Status: SHIPPED | OUTPATIENT
Start: 2022-04-22 | End: 2023-07-07

## 2022-04-22 NOTE — TELEPHONE ENCOUNTER
Lithium      Last Written Prescription Date:  3/4/21  Last Fill Quantity: 180,   # refills: 3  Last Office Visit: 4/6/22  Future Office visit:    Next 5 appointments (look out 90 days)    May 06, 2022 11:15 AM  (Arrive by 11:00 AM)  SHORT with Glenn Segovia MD  [facility] ([facility] ) [address]  [place] [address]  [phone]           Routing refill request to provider for review/approval because:

## 2022-04-26 ENCOUNTER — HOSPITAL ENCOUNTER (EMERGENCY)
Facility: OTHER | Age: 72
Discharge: HOME | End: 2022-04-26
Payer: MEDICARE

## 2022-04-26 ENCOUNTER — NURSE TRIAGE (OUTPATIENT)
Dept: FAMILY MEDICINE | Facility: OTHER | Age: 72
End: 2022-04-26
Payer: COMMERCIAL

## 2022-04-26 VITALS
WEIGHT: 248 LBS | DIASTOLIC BLOOD PRESSURE: 97 MMHG | BODY MASS INDEX: 39.16 KG/M2 | RESPIRATION RATE: 18 BRPM | TEMPERATURE: 98.1 F | OXYGEN SATURATION: 97 % | HEART RATE: 87 BPM | SYSTOLIC BLOOD PRESSURE: 160 MMHG

## 2022-04-26 NOTE — TELEPHONE ENCOUNTER
Called pt to schedule BP check tomorrow nurse only and pt to bring his cuff with to check. He is wondering if this can be done in [facility]?    Please advise.    Nkechi Duval RN

## 2022-04-26 NOTE — TELEPHONE ENCOUNTER
Called pt to schedule nurse only and offer NA clinic. Spouse in background stating he needs to see someone.Asked pt if something has changed since last spoken? He states he feels like he seems a little foggy and doesn't feel right and maybe he will talk to his son who is a doctor.He might want him to go to the ED. Denies chest pain or SOB.Updated PCP and pt should go to ED.Advised ED and if needs immediate care call 911. Pt verbalized understanding.    Nkechi Duval RN

## 2022-04-26 NOTE — ED TRIAGE NOTES
ED Nursing Triage Note (General)   ________________________________    Umer Archuleta is a 71 year old Male that presents to triage via private vehicle with complaints of hypertension.  Patient states hx of hypertension and states recent medication changes 3 weeks ago.  Patient states he monitors his BP at home at it has been running 150-160 over the past couple days.  Patient denies chest pain or SOB at this time.   Significant symptoms had onset  ago.  Patient appears alert behavior.  GCS-15  Airway: intact  Breathing noted as Normal  Action taken: 3      PRE HOSPITAL PRIOR LIVING SITUATION-home

## 2022-04-26 NOTE — TELEPHONE ENCOUNTER
" Pt calling and his BP at 10am was 180/120.Denies heart or neuro s/s. He took his BP at the same time. Spoke with MD and pt to relax,rest and recheck.Schedule nurse only BP check and have pt bring cuff to appt.Bp now is 175/110.    Pt unable to make appt today as he has to go  his spouse.    He would be available tomorrow to do a BP only nurse check.    Advised if s/s occur needs to go to ED.He verbalized understanding.    Will send message to MD for recommendations.    Please advise and call pt back [phone]    Nkechi Duval RN      Reason for Disposition    [1] Systolic BP  >= 200 OR Diastolic >= 120  AND [2] having NO cardiac or neurologic symptoms    Additional Information    Negative: Difficult to awaken or acting confused (e.g., disoriented, slurred speech)    Negative: Severe difficulty breathing (e.g., struggling for each breath, speaks in single words)    Negative: [1] Weakness of the face, arm or leg on one side of the body AND [2] new onset    Negative: [1] Numbness (i.e., loss of sensation) of the face, arm or leg on one side of the body AND [2] new onset    Negative: [1] Chest pain lasts > 5 minutes AND [2] history of heart disease  (i.e., heart attack, bypass surgery, angina, angioplasty, CHF)    Negative: [1] Chest pain AND [2] took nitrogylcerin AND [3] pain was not relieved    Negative: Sounds like a life-threatening emergency to the triager    Negative: Symptom is main concern  (e.g., headache, chest pain)    Negative: Low blood pressure is main concern    Negative: [1] Systolic BP  >= 160 OR Diastolic >= 100 AND [2] cardiac or neurologic symptoms (e.g., chest pain, difficulty breathing, unsteady gait, blurred vision)    Negative: [1] Pregnant > 20 weeks (or postpartum < 6 weeks) AND [2] new hand or face swelling    Negative: [1] Pregnant > 20 weeks AND [2] BP Systolic BP  >= 140 OR Diastolic >= 90    Answer Assessment - Initial Assessment Questions  1. BLOOD PRESSURE: \"What is the blood " "pressure?\" \"Did you take at least two measurements 5 minutes apart?\"      180/120 at 10am-taken BP pill 10am  2. ONSET: \"When did you take your blood pressure?\"      See above  3. HOW: \"How did you obtain the blood pressure?\" (e.g., visiting nurse, automatic home BP monitor)      yes  4. HISTORY: \"Do you have a history of high blood pressure?\"      yes  5. MEDICATIONS: \"Are you taking any medications for blood pressure?\" \"Have you missed any doses recently?\"     Might of missed one in two or three week period  6. OTHER SYMPTOMS: \"Do you have any symptoms?\" (e.g., headache, chest pain, blurred vision, difficulty breathing, weakness)     no  7. PREGNANCY: \"Is there any chance you are pregnant?\" \"When was your last menstrual period?\"      Na    Protocols used: HIGH BLOOD PRESSURE-A-AH      "

## 2022-04-27 ENCOUNTER — ANCILLARY PROCEDURE (OUTPATIENT)
Dept: GENERAL RADIOLOGY | Facility: OTHER | Age: 72
End: 2022-04-27
Attending: FAMILY MEDICINE
Payer: MEDICARE

## 2022-04-27 ENCOUNTER — OFFICE VISIT (OUTPATIENT)
Dept: FAMILY MEDICINE | Facility: OTHER | Age: 72
End: 2022-04-27
Attending: FAMILY MEDICINE
Payer: MEDICARE

## 2022-04-27 VITALS
OXYGEN SATURATION: 98 % | HEART RATE: 73 BPM | SYSTOLIC BLOOD PRESSURE: 122 MMHG | BODY MASS INDEX: 37.74 KG/M2 | RESPIRATION RATE: 18 BRPM | WEIGHT: 239 LBS | DIASTOLIC BLOOD PRESSURE: 92 MMHG | TEMPERATURE: 97.8 F

## 2022-04-27 DIAGNOSIS — R05.9 COUGH: ICD-10-CM

## 2022-04-27 DIAGNOSIS — R53.81 MALAISE: ICD-10-CM

## 2022-04-27 DIAGNOSIS — F31.11 BIPOLAR I DISORDER, MOST RECENT EPISODE (OR CURRENT) MANIC, MILD (H): ICD-10-CM

## 2022-04-27 DIAGNOSIS — I10 BENIGN ESSENTIAL HYPERTENSION: Primary | ICD-10-CM

## 2022-04-27 DIAGNOSIS — R73.03 PREDIABETES: ICD-10-CM

## 2022-04-27 DIAGNOSIS — R41.3 MEMORY CHANGES: ICD-10-CM

## 2022-04-27 LAB
ALBUMIN SERPL-MCNC: 3.8 G/DL (ref 3.4–5)
ALP SERPL-CCNC: 60 U/L (ref 40–150)
ALT SERPL W P-5'-P-CCNC: 136 U/L (ref 0–70)
ANION GAP SERPL CALCULATED.3IONS-SCNC: 3 MMOL/L (ref 3–14)
AST SERPL W P-5'-P-CCNC: 63 U/L (ref 0–45)
BASOPHILS # BLD AUTO: 0 10E3/UL (ref 0–0.2)
BASOPHILS NFR BLD AUTO: 1 %
BILIRUB SERPL-MCNC: 0.7 MG/DL (ref 0.2–1.3)
BUN SERPL-MCNC: 15 MG/DL (ref 7–30)
CALCIUM SERPL-MCNC: 9.1 MG/DL (ref 8.5–10.1)
CHLORIDE BLD-SCNC: 104 MMOL/L (ref 94–109)
CO2 SERPL-SCNC: 32 MMOL/L (ref 20–32)
CREAT SERPL-MCNC: 0.96 MG/DL (ref 0.66–1.25)
EOSINOPHIL # BLD AUTO: 0.5 10E3/UL (ref 0–0.7)
EOSINOPHIL NFR BLD AUTO: 7 %
ERYTHROCYTE [DISTWIDTH] IN BLOOD BY AUTOMATED COUNT: 14.4 % (ref 10–15)
EST. AVERAGE GLUCOSE BLD GHB EST-MCNC: 117 MG/DL
FOLATE SERPL-MCNC: 18.7 NG/ML
GFR SERPL CREATININE-BSD FRML MDRD: 85 ML/MIN/1.73M2
GLUCOSE BLD-MCNC: 107 MG/DL (ref 70–99)
HBA1C MFR BLD: 5.7 % (ref 0–5.6)
HCT VFR BLD AUTO: 46.9 % (ref 40–53)
HGB BLD-MCNC: 15.3 G/DL (ref 13.3–17.7)
IMM GRANULOCYTES # BLD: 0 10E3/UL
IMM GRANULOCYTES NFR BLD: 0 %
LITHIUM SERPL-SCNC: 0.5 MMOL/L
LYMPHOCYTES # BLD AUTO: 1.6 10E3/UL (ref 0.8–5.3)
LYMPHOCYTES NFR BLD AUTO: 22 %
MCH RBC QN AUTO: 30.7 PG (ref 26.5–33)
MCHC RBC AUTO-ENTMCNC: 32.6 G/DL (ref 31.5–36.5)
MCV RBC AUTO: 94 FL (ref 78–100)
MONOCYTES # BLD AUTO: 0.6 10E3/UL (ref 0–1.3)
MONOCYTES NFR BLD AUTO: 8 %
NEUTROPHILS # BLD AUTO: 4.6 10E3/UL (ref 1.6–8.3)
NEUTROPHILS NFR BLD AUTO: 62 %
NRBC # BLD AUTO: 0 10E3/UL
NRBC BLD AUTO-RTO: 0 /100
PLATELET # BLD AUTO: 262 10E3/UL (ref 150–450)
POTASSIUM BLD-SCNC: 4.3 MMOL/L (ref 3.4–5.3)
PROT SERPL-MCNC: 7.7 G/DL (ref 6.8–8.8)
RBC # BLD AUTO: 4.99 10E6/UL (ref 4.4–5.9)
SODIUM SERPL-SCNC: 139 MMOL/L (ref 133–144)
TSH SERPL DL<=0.005 MIU/L-ACNC: 2.45 MU/L (ref 0.4–4)
VIT B12 SERPL-MCNC: 1842 PG/ML (ref 193–986)
WBC # BLD AUTO: 7.2 10E3/UL (ref 4–11)

## 2022-04-27 PROCEDURE — 80053 COMPREHEN METABOLIC PANEL: CPT | Mod: ZL | Performed by: FAMILY MEDICINE

## 2022-04-27 PROCEDURE — 84443 ASSAY THYROID STIM HORMONE: CPT | Mod: ZL | Performed by: FAMILY MEDICINE

## 2022-04-27 PROCEDURE — 83036 HEMOGLOBIN GLYCOSYLATED A1C: CPT | Mod: ZL | Performed by: FAMILY MEDICINE

## 2022-04-27 PROCEDURE — 82607 VITAMIN B-12: CPT | Mod: ZL | Performed by: FAMILY MEDICINE

## 2022-04-27 PROCEDURE — 99214 OFFICE O/P EST MOD 30 MIN: CPT | Performed by: FAMILY MEDICINE

## 2022-04-27 PROCEDURE — 80178 ASSAY OF LITHIUM: CPT | Mod: ZL | Performed by: FAMILY MEDICINE

## 2022-04-27 PROCEDURE — 36415 COLL VENOUS BLD VENIPUNCTURE: CPT | Mod: ZL | Performed by: FAMILY MEDICINE

## 2022-04-27 PROCEDURE — 71046 X-RAY EXAM CHEST 2 VIEWS: CPT | Mod: TC

## 2022-04-27 PROCEDURE — G0463 HOSPITAL OUTPT CLINIC VISIT: HCPCS | Mod: 25

## 2022-04-27 PROCEDURE — 82746 ASSAY OF FOLIC ACID SERUM: CPT | Mod: ZL | Performed by: FAMILY MEDICINE

## 2022-04-27 PROCEDURE — 86780 TREPONEMA PALLIDUM: CPT | Mod: ZL | Performed by: FAMILY MEDICINE

## 2022-04-27 PROCEDURE — G0463 HOSPITAL OUTPT CLINIC VISIT: HCPCS

## 2022-04-27 PROCEDURE — 85025 COMPLETE CBC W/AUTO DIFF WBC: CPT | Mod: ZL | Performed by: FAMILY MEDICINE

## 2022-04-27 ASSESSMENT — PAIN SCALES - GENERAL: PAINLEVEL: MILD PAIN (3)

## 2022-04-27 NOTE — PROGRESS NOTES
"  Assessment & Plan     Benign essential hypertension  BP I feel is to goal and all his higher readings is due to user issue of [place]-- new cuff or check BP at clinic discussed.  Continue current medications and behavioral changes.   - CBC with Platelets & Differential  - Comprehensive metabolic panel  - TSH  - Folate  - Vitamin B12  - Lithium level  - Hemoglobin A1c  - Treponema Abs w Reflex to RPR and Titer    Memory changes  In last 3 weeks. Few labs pending . Doubt related to change in BP  Med. Will watch for now and reassess in 4-6 weeks. Await labs.   - CBC with Platelets & Differential  - Comprehensive metabolic panel  - TSH  - Folate  - Vitamin B12  - Lithium level  - Hemoglobin A1c  - Treponema Abs w Reflex to RPR and Titer    Bipolar I disorder, most recent episode (or current) manic, mild (H)  Overall stable   - CBC with Platelets & Differential  - Comprehensive metabolic panel  - TSH  - Folate  - Vitamin B12  - Lithium level  - Hemoglobin A1c  - Treponema Abs w Reflex to RPR and Titer    Prediabetes  Stable   - CBC with Platelets & Differential  - Comprehensive metabolic panel  - TSH  - Folate  - Vitamin B12  - Lithium level  - Hemoglobin A1c  - Treponema Abs w Reflex to RPR and Titer    Malaise  Mild - will watch  - CBC with Platelets & Differential  - Comprehensive metabolic panel  - TSH  - Folate  - Vitamin B12  - Lithium level  - Hemoglobin A1c  - Treponema Abs w Reflex to RPR and Titer    Cough  Some better - CXR ok . ?? Related or not to ACE!-- he says about 40% better. Will watch.   - XR Chest 2 Views; Future             BMI:   Estimated body mass index is 37.74 kg/m  as calculated from the following:    Height as of 12/28/21: 1.695 m (5' 6.73\").    Weight as of this encounter: 108.4 kg (239 lb).           No follow-ups on file.    Glenn Segovia MD  [facility] - RUP[place] Colon is a 71 year old who presents for the following health issues     HPI     Hypertension " Follow-up      Do you check your blood pressure regularly outside of the clinic? No     Are you following a low salt diet? No    Are your blood pressures ever more than 140 on the top number (systolic) OR more   than 90 on the bottom number (diastolic), for example 140/90? Yes    Switched Zestoretic to Hyzaar due to cough-- says cough 40% better in last 3 weeks    Change in memory in last 3 weeks ?? Related to above change of meds  Not sleeping as well  No other changes of meds or behaviors  Seem to be short memory issues. - Where did I put my phone or keys. ??    NO Fall/CHI/other changes.     Has been checking BP more regular -- BP up 130-200/100-140    Check BP cuff to ours - 10 points higher but pt did not use in his ears.   When he checked and used his ears -- he was over 40 points above on top  and several points above on bottom  He does say he has some [place]          Review of Systems   Constitutional, HEENT, cardiovascular, pulmonary, gi and gu systems are negative, except as otherwise noted.      Objective    BP (!) 122/92 (BP Location: Right arm, Patient Position: Sitting, Cuff Size: Adult Large)   Pulse 73   Temp 97.8  F (36.6  C) (Tympanic)   Resp 18   Wt 108.4 kg (239 lb)   SpO2 98%   BMI 37.74 kg/m    Body mass index is 37.74 kg/m .  Physical Exam   GENERAL: healthy, alert and no distress  EYES: Eyes grossly normal to inspection, PERRL and conjunctivae and sclerae normal  HENT: ear canals and TM's normal, nose and mouth without ulcers or lesions  NECK: no adenopathy, no asymmetry, masses, or scars and thyroid normal to palpation  RESP: lungs clear to auscultation - no rales, rhonchi or wheezes  CV: regular rate and rhythm, normal S1 S2, no S3 or S4, no murmur, click or rub, no peripheral edema and peripheral pulses strong  ABDOMEN: soft, nontender, no hepatosplenomegaly, no masses and bowel sounds normal  MS: no gross musculoskeletal defects noted, no edema  NEURO: Normal strength and tone,  mentation intact and speech normal  PSYCH: mentation appears normal, affect normal/bright    Results for orders placed or performed in visit on 04/27/22   XR Chest 2 Views     Status: None    Narrative    PROCEDURE:  XR CHEST 2 VW    HISTORY: Cough, .    COMPARISON:  12/28/2021    FINDINGS:  The cardiomediastinal contours are stable. The trachea is midline.  No focal consolidation, effusion or pneumothorax.    No suspicious osseous lesion or subdiaphragmatic free air.      Impression    IMPRESSION:      No focal consolidation.      URBAN SEGURA MD         SYSTEM ID:  [id number]   Results for orders placed or performed in visit on 04/27/22   Comprehensive metabolic panel     Status: Abnormal   Result Value Ref Range    Sodium 139 133 - 144 mmol/L    Potassium 4.3 3.4 - 5.3 mmol/L    Chloride 104 94 - 109 mmol/L    Carbon Dioxide (CO2) 32 20 - 32 mmol/L    Anion Gap 3 3 - 14 mmol/L    Urea Nitrogen 15 7 - 30 mg/dL    Creatinine 0.96 0.66 - 1.25 mg/dL    Calcium 9.1 8.5 - 10.1 mg/dL    Glucose 107 (H) 70 - 99 mg/dL    Alkaline Phosphatase 60 40 - 150 U/L    AST 63 (H) 0 - 45 U/L     (H) 0 - 70 U/L    Protein Total 7.7 6.8 - 8.8 g/dL    Albumin 3.8 3.4 - 5.0 g/dL    Bilirubin Total 0.7 0.2 - 1.3 mg/dL    GFR Estimate 85 >60 mL/min/1.73m2   TSH     Status: Normal   Result Value Ref Range    TSH 2.45 0.40 - 4.00 mU/L   Lithium level     Status: Normal   Result Value Ref Range    Lithium 0.5   mmol/L   Hemoglobin A1c     Status: Abnormal   Result Value Ref Range    Estimated Average Glucose 117 mg/dL    Hemoglobin A1C 5.7 (H) 0.0 - 5.6 %   CBC with platelets and differential     Status: None   Result Value Ref Range    WBC Count 7.2 4.0 - 11.0 10e3/uL    RBC Count 4.99 4.40 - 5.90 10e6/uL    Hemoglobin 15.3 13.3 - 17.7 g/dL    Hematocrit 46.9 40.0 - 53.0 %    MCV 94 78 - 100 fL    MCH 30.7 26.5 - 33.0 pg    MCHC 32.6 31.5 - 36.5 g/dL    RDW 14.4 10.0 - 15.0 %    Platelet Count 262 150 - 450 10e3/uL    %  Neutrophils 62 %    % Lymphocytes 22 %    % Monocytes 8 %    % Eosinophils 7 %    % Basophils 1 %    % Immature Granulocytes 0 %    NRBCs per 100 WBC 0 <1 /100    Absolute Neutrophils 4.6 1.6 - 8.3 10e3/uL    Absolute Lymphocytes 1.6 0.8 - 5.3 10e3/uL    Absolute Monocytes 0.6 0.0 - 1.3 10e3/uL    Absolute Eosinophils 0.5 0.0 - 0.7 10e3/uL    Absolute Basophils 0.0 0.0 - 0.2 10e3/uL    Absolute Immature Granulocytes 0.0 <=0.4 10e3/uL    Absolute NRBCs 0.0 10e3/uL   CBC with Platelets & Differential     Status: None    Narrative    The following orders were created for panel order CBC with Platelets & Differential.  Procedure                               Abnormality         Status                     ---------                               -----------         ------                     CBC with platelets and d...[[id number]]                      Final result                 Please view results for these tests on the individual orders.

## 2022-04-27 NOTE — NURSING NOTE
"Chief Complaint   Patient presents with     Hypertension       Initial BP (!) 122/92 (BP Location: Right arm, Patient Position: Sitting, Cuff Size: Adult Large)   Pulse 73   Temp 97.8  F (36.6  C) (Tympanic)   Resp 18   Wt 108.4 kg (239 lb)   SpO2 98%   BMI 37.74 kg/m   Estimated body mass index is 37.74 kg/m  as calculated from the following:    Height as of 12/28/21: 1.695 m (5' 6.73\").    Weight as of this encounter: 108.4 kg (239 lb).  Medication Reconciliation: complete  Malu Almanza LPN  "

## 2022-04-28 LAB — T PALLIDUM AB SER QL: NONREACTIVE

## 2022-04-30 ENCOUNTER — HEALTH MAINTENANCE LETTER (OUTPATIENT)
Age: 72
End: 2022-04-30

## 2022-05-04 ENCOUNTER — MYC MEDICAL ADVICE (OUTPATIENT)
Dept: FAMILY MEDICINE | Facility: OTHER | Age: 72
End: 2022-05-04
Payer: COMMERCIAL

## 2022-05-16 ENCOUNTER — OFFICE VISIT (OUTPATIENT)
Dept: FAMILY MEDICINE | Facility: OTHER | Age: 72
End: 2022-05-16
Attending: FAMILY MEDICINE
Payer: MEDICARE

## 2022-05-16 VITALS
TEMPERATURE: 97.1 F | BODY MASS INDEX: 37.89 KG/M2 | SYSTOLIC BLOOD PRESSURE: 148 MMHG | OXYGEN SATURATION: 99 % | HEART RATE: 61 BPM | WEIGHT: 240 LBS | DIASTOLIC BLOOD PRESSURE: 96 MMHG | RESPIRATION RATE: 18 BRPM

## 2022-05-16 DIAGNOSIS — J40 BRONCHITIS: Primary | ICD-10-CM

## 2022-05-16 LAB — SARS-COV-2 RNA RESP QL NAA+PROBE: NEGATIVE

## 2022-05-16 PROCEDURE — U0005 INFEC AGEN DETEC AMPLI PROBE: HCPCS | Mod: ZL | Performed by: FAMILY MEDICINE

## 2022-05-16 PROCEDURE — 99213 OFFICE O/P EST LOW 20 MIN: CPT | Mod: CS | Performed by: FAMILY MEDICINE

## 2022-05-16 PROCEDURE — G0463 HOSPITAL OUTPT CLINIC VISIT: HCPCS

## 2022-05-16 RX ORDER — AZITHROMYCIN 250 MG/1
TABLET, FILM COATED ORAL
Qty: 6 TABLET | Refills: 0 | Status: SHIPPED | OUTPATIENT
Start: 2022-05-16 | End: 2022-08-23

## 2022-05-16 ASSESSMENT — PAIN SCALES - GENERAL: PAINLEVEL: SEVERE PAIN (6)

## 2022-05-16 NOTE — PROGRESS NOTES
"  Assessment & Plan     Bronchitis  Covid done and pending -- if negative - fill zpack for clinical Bronchitis . . IF covid positive- then change treatment plan. Symptomatic treatment was discussed along what is available for OTC medications for symptomatic relief.   Symptomatic treatment was discussed along when patient should call and/or come back into the clinic or go to ER/Urgent care. All questions answered.     - Symptomatic; Yes; 5/14/2022 COVID-19 Virus (Coronavirus) by PCR Nasopharyngeal  - azithromycin (ZITHROMAX) 250 MG tablet; Zpack As directed             BMI:   Estimated body mass index is 37.89 kg/m  as calculated from the following:    Height as of 12/28/21: 1.695 m (5' 6.73\").    Weight as of this encounter: 108.9 kg (240 lb).           No follow-ups on file.    Glenn Segovia MD  [facility] - [place] Colon is a 71 year old who presents for the following health issues     HPI     Acute Illness  Acute illness concerns: Cough  Onset/Duration: 2-3 days  Symptoms:  Fever: no  Chills/Sweats: no  Headache (location?): YES  Sinus Pressure: YES  Conjunctivitis:  no  Ear Pain: no  Rhinorrhea: YES  Congestion: YES  Sore Throat: YES  Cough: YES-non-productive, productive of yellow sputum  Wheeze: no  Decreased Appetite: YES  Nausea: YES  Vomiting: no  Diarrhea: YES  Dysuria/Freq.: no  Dysuria or Hematuria: no  Fatigue/Achiness: YES  Sick/Strep Exposure: no  Therapies tried and outcome: benedryl  Smell and taste  No seasonal allergies   Has had 3 covid shots     Some covid contacts ???         Review of Systems   Constitutional, HEENT, cardiovascular, pulmonary, gi and gu systems are negative, except as otherwise noted.      Objective    BP (!) 148/96 (BP Location: Right arm, Patient Position: Sitting, Cuff Size: Adult Large)   Pulse 61   Temp 97.1  F (36.2  C) (Tympanic)   Resp 18   Wt 108.9 kg (240 lb)   SpO2 99%   BMI 37.89 kg/m    Body mass index is 37.89 " kg/m .  Physical Exam   GENERAL: healthy, alert and no distress  EYES: Eyes grossly normal to inspection, PERRL and conjunctivae and sclerae normal  HENT: ear canals and TM's normal, nose and mouth without ulcers or lesions  NECK: no adenopathy, no asymmetry, masses, or scars and thyroid normal to palpation  RESP: lungs clear to auscultation - no rales, rhonchi or wheezes  CV: regular rate and rhythm, normal S1 S2, no S3 or S4, no murmur, click or rub, no peripheral edema and peripheral pulses strong  ABDOMEN: soft, nontender, no hepatosplenomegaly, no masses and bowel sounds normal  MS: no gross musculoskeletal defects noted, no edema

## 2022-05-16 NOTE — NURSING NOTE
"Chief Complaint   Patient presents with     Cough       Initial BP (!) 148/96 (BP Location: Right arm, Patient Position: Sitting, Cuff Size: Adult Large)   Pulse 61   Temp 97.1  F (36.2  C) (Tympanic)   Resp 18   Wt 108.9 kg (240 lb)   SpO2 99%   BMI 37.89 kg/m   Estimated body mass index is 37.89 kg/m  as calculated from the following:    Height as of 12/28/21: 1.695 m (5' 6.73\").    Weight as of this encounter: 108.9 kg (240 lb).  Medication Reconciliation: complete  Malu Almanza LPN  "

## 2022-06-07 DIAGNOSIS — T46.4X5A ACE-INHIBITOR COUGH: ICD-10-CM

## 2022-06-07 DIAGNOSIS — R05.8 ACE-INHIBITOR COUGH: ICD-10-CM

## 2022-06-07 DIAGNOSIS — I10 ESSENTIAL HYPERTENSION WITH GOAL BLOOD PRESSURE LESS THAN 140/90: ICD-10-CM

## 2022-06-07 RX ORDER — LOSARTAN POTASSIUM AND HYDROCHLOROTHIAZIDE 12.5; 5 MG/1; MG/1
1 TABLET ORAL DAILY
Qty: 90 TABLET | Refills: 3 | Status: SHIPPED | OUTPATIENT
Start: 2022-06-07 | End: 2022-06-10

## 2022-06-07 NOTE — TELEPHONE ENCOUNTER
losartan-hydrochlorothiazide (HYZAAR) 50-12.5 MG tablet      Last Written Prescription Date:  04/06/22  Last Fill Quantity: 60,   # refills: 1  Last Office Visit: 05/16/22  Future Office visit:    Next 5 appointments (look out 90 days)    Jun 13, 2022  9:45 AM  (Arrive by 9:30 AM)  SHORT with Glenn Segovia MD  [facility] ([facility] [place] ) [address]  [place] [address]  [phone]           Routing refill request to provider for review/approval because:    Last blood pressure under 140/90 in past 12 months        BP Readings from Last 3 Encounters:   05/16/22 (!) 148/96   04/27/22 (!) 122/92   12/28/21 110/74

## 2022-06-10 DIAGNOSIS — I10 ESSENTIAL HYPERTENSION WITH GOAL BLOOD PRESSURE LESS THAN 140/90: ICD-10-CM

## 2022-06-10 DIAGNOSIS — T46.4X5A ACE-INHIBITOR COUGH: ICD-10-CM

## 2022-06-10 DIAGNOSIS — R05.8 ACE-INHIBITOR COUGH: ICD-10-CM

## 2022-06-10 RX ORDER — LOSARTAN POTASSIUM AND HYDROCHLOROTHIAZIDE 12.5; 5 MG/1; MG/1
1 TABLET ORAL DAILY
Qty: 10 TABLET | Refills: 0 | Status: SHIPPED | OUTPATIENT
Start: 2022-06-10 | End: 2022-06-13

## 2022-06-13 ENCOUNTER — LAB (OUTPATIENT)
Dept: LAB | Facility: OTHER | Age: 72
End: 2022-06-13
Attending: FAMILY MEDICINE
Payer: MEDICARE

## 2022-06-13 ENCOUNTER — OFFICE VISIT (OUTPATIENT)
Dept: FAMILY MEDICINE | Facility: OTHER | Age: 72
End: 2022-06-13
Attending: FAMILY MEDICINE
Payer: MEDICARE

## 2022-06-13 VITALS
BODY MASS INDEX: 38.68 KG/M2 | WEIGHT: 245 LBS | TEMPERATURE: 96.8 F | SYSTOLIC BLOOD PRESSURE: 136 MMHG | DIASTOLIC BLOOD PRESSURE: 88 MMHG | HEART RATE: 60 BPM | OXYGEN SATURATION: 98 % | RESPIRATION RATE: 18 BRPM

## 2022-06-13 DIAGNOSIS — G44.219 EPISODIC TENSION-TYPE HEADACHE, NOT INTRACTABLE: ICD-10-CM

## 2022-06-13 DIAGNOSIS — I10 ESSENTIAL HYPERTENSION WITH GOAL BLOOD PRESSURE LESS THAN 140/90: ICD-10-CM

## 2022-06-13 DIAGNOSIS — I10 ESSENTIAL HYPERTENSION WITH GOAL BLOOD PRESSURE LESS THAN 140/90: Primary | ICD-10-CM

## 2022-06-13 DIAGNOSIS — R05.8 ACE-INHIBITOR COUGH: ICD-10-CM

## 2022-06-13 DIAGNOSIS — T46.4X5A ACE-INHIBITOR COUGH: ICD-10-CM

## 2022-06-13 LAB
ANION GAP SERPL CALCULATED.3IONS-SCNC: 3 MMOL/L (ref 3–14)
BUN SERPL-MCNC: 9 MG/DL (ref 7–30)
CALCIUM SERPL-MCNC: 8.6 MG/DL (ref 8.5–10.1)
CHLORIDE BLD-SCNC: 107 MMOL/L (ref 94–109)
CO2 SERPL-SCNC: 30 MMOL/L (ref 20–32)
CREAT SERPL-MCNC: 0.92 MG/DL (ref 0.66–1.25)
GFR SERPL CREATININE-BSD FRML MDRD: 89 ML/MIN/1.73M2
GLUCOSE BLD-MCNC: 113 MG/DL (ref 70–99)
POTASSIUM BLD-SCNC: 3.7 MMOL/L (ref 3.4–5.3)
SODIUM SERPL-SCNC: 140 MMOL/L (ref 133–144)

## 2022-06-13 PROCEDURE — 82310 ASSAY OF CALCIUM: CPT | Mod: ZL

## 2022-06-13 PROCEDURE — 36415 COLL VENOUS BLD VENIPUNCTURE: CPT | Mod: ZL

## 2022-06-13 PROCEDURE — G0463 HOSPITAL OUTPT CLINIC VISIT: HCPCS

## 2022-06-13 PROCEDURE — 99213 OFFICE O/P EST LOW 20 MIN: CPT | Performed by: FAMILY MEDICINE

## 2022-06-13 RX ORDER — LOSARTAN POTASSIUM AND HYDROCHLOROTHIAZIDE 12.5; 5 MG/1; MG/1
1 TABLET ORAL DAILY
Qty: 90 TABLET | Refills: 3 | Status: SHIPPED | OUTPATIENT
Start: 2022-06-13 | End: 2023-08-23

## 2022-06-13 ASSESSMENT — PAIN SCALES - GENERAL: PAINLEVEL: MODERATE PAIN (5)

## 2022-06-13 NOTE — NURSING NOTE
"Chief Complaint   Patient presents with     Recheck Medication       Initial /88 (BP Location: Left arm, Patient Position: Sitting, Cuff Size: Adult Large)   Pulse 60   Temp 96.8  F (36  C) (Tympanic)   Resp 18   Wt 111.1 kg (245 lb)   SpO2 98%   BMI 38.68 kg/m   Estimated body mass index is 38.68 kg/m  as calculated from the following:    Height as of 12/28/21: 1.695 m (5' 6.73\").    Weight as of this encounter: 111.1 kg (245 lb).  Medication Reconciliation: complete  Malu Almanza LPN  "

## 2022-06-13 NOTE — PROGRESS NOTES
"  Assessment & Plan     Essential hypertension with goal blood pressure less than 140/90  BP good control with meds. Continue current medications and behavioral changes.   Follow-up in 5 months   - Basic metabolic panel; Future  - losartan-hydrochlorothiazide (HYZAAR) 50-12.5 MG tablet; Take 1 tablet by mouth daily    ACE-inhibitor cough  Resolved after med switch  - losartan-hydrochlorothiazide (HYZAAR) 50-12.5 MG tablet; Take 1 tablet by mouth daily    Episodic tension-type headache, not intractable  Sounds tension like - possible from neck djd.  Discussed. Discussed in length conservative measures of OTC medications for pain, Ice/Heat, elevation and the concept of R.I.C.E.. Continue behavioral changes and proper body mechanics to allow for healing. Follow up as directed.   Offered PT - declined.   Symptomatic treatment was discussed along when patient should call and/or come back into the clinic or go to ER/Urgent care. All questions answered.              BMI:   Estimated body mass index is 38.68 kg/m  as calculated from the following:    Height as of 12/28/21: 1.695 m (5' 6.73\").    Weight as of this encounter: 111.1 kg (245 lb).           No follow-ups on file.    Glenn Segovia MD  [facility] - [place]    Subjective   Isaiah is a 71 year old who presents for the following health issues     HPI     Hypertension Follow-up      Do you check your blood pressure regularly outside of the clinic? Yes     Are you following a low salt diet? No    Are your blood pressures ever more than 140 on the top number (systolic) OR more   than 90 on the bottom number (diastolic), for example 140/90? Yes   BP better   BP cuff is comparable to ours  ACE cough gone   BP stable       SUBJECTIVE:  Umer Archuleta is a 71 year old male who complains of headaches for  years. Description of pain: dull pain, bilateral in the frontal area. Duration of individual headaches: 1 hours, frequency several times per week. " Associated symptoms: nausea and neck stiffness. Pain relief: excedrin headache. Precipitating factors: patient is aware of none. He denies a history of recent head injury.   Prior neurological history: negative for no neurological problems.  Neurologic Review of Systems - no TIA or stroke-like symptoms.  On CPAP  No relation to allergies   Does not use meds all the time   Rest better     Current Outpatient Medications   Medication Sig Dispense Refill     aspirin EC 81 MG tablet Take 81 mg by mouth daily       azithromycin (ZITHROMAX) 250 MG tablet Zpack As directed 6 tablet 0     cetirizine (ZYRTEC) 10 MG tablet Take 10 mg by mouth daily       Cholecalciferol (VITAMIN D3) 5000 UNIT/ML LIQD Take 1 drop by mouth       DULoxetine (CYMBALTA) 60 MG capsule TAKE 1 CAPSULE DAILY (DUE FOR VISIT) 90 capsule 1     fluticasone (FLONASE) 50 MCG/ACT nasal spray Spray 2 sprays into both nostrils daily (Patient not taking: Reported on 5/16/2022) 16 g 11     lithium 300 MG capsule TAKE 2 CAPSULES DAILY 180 capsule 3     losartan-hydrochlorothiazide (HYZAAR) 50-12.5 MG tablet Take 1 tablet by mouth daily 10 tablet 0     omeprazole (PRILOSEC) 20 MG DR capsule TAKE 1 CAPSULE DAILY 90 capsule 1     sildenafil (VIAGRA) 100 MG tablet Take 1 tablet (100 mg) by mouth daily as needed (as needed for erectile dysfunction) 10 tablet 0             Review of Systems   Constitutional, HEENT, cardiovascular, pulmonary, gi and gu systems are negative, except as otherwise noted.      Objective    /88 (BP Location: Left arm, Patient Position: Sitting, Cuff Size: Adult Large)   Pulse 60   Temp 96.8  F (36  C) (Tympanic)   Resp 18   Wt 111.1 kg (245 lb)   SpO2 98%   BMI 38.68 kg/m    Body mass index is 38.68 kg/m .  Physical Exam   GENERAL: healthy, alert and no distress  EYES: Eyes grossly normal to inspection, PERRL and conjunctivae and sclerae normal  HENT: ear canals and TM's normal, nose and mouth without ulcers or lesions  NECK: no  adenopathy, no asymmetry, masses, or scars and thyroid normal to palpation  RESP: lungs clear to auscultation - no rales, rhonchi or wheezes  CV: regular rate and rhythm, normal S1 S2, no S3 or S4, no murmur, click or rub, no peripheral edema and peripheral pulses strong  NEURO: Normal strength and tone, mentation intact and speech normal    Results for orders placed or performed in visit on 06/13/22   Basic metabolic panel     Status: Abnormal   Result Value Ref Range    Sodium 140 133 - 144 mmol/L    Potassium 3.7 3.4 - 5.3 mmol/L    Chloride 107 94 - 109 mmol/L    Carbon Dioxide (CO2) 30 20 - 32 mmol/L    Anion Gap 3 3 - 14 mmol/L    Urea Nitrogen 9 7 - 30 mg/dL    Creatinine 0.92 0.66 - 1.25 mg/dL    Calcium 8.6 8.5 - 10.1 mg/dL    Glucose 113 (H) 70 - 99 mg/dL    GFR Estimate 89 >60 mL/min/1.73m2

## 2022-08-23 ENCOUNTER — OFFICE VISIT (OUTPATIENT)
Dept: FAMILY MEDICINE | Facility: OTHER | Age: 72
End: 2022-08-23
Attending: NURSE PRACTITIONER
Payer: MEDICARE

## 2022-08-23 VITALS
WEIGHT: 238.7 LBS | TEMPERATURE: 98.4 F | BODY MASS INDEX: 37.69 KG/M2 | HEART RATE: 73 BPM | SYSTOLIC BLOOD PRESSURE: 140 MMHG | DIASTOLIC BLOOD PRESSURE: 94 MMHG | OXYGEN SATURATION: 96 %

## 2022-08-23 DIAGNOSIS — I10 ESSENTIAL HYPERTENSION WITH GOAL BLOOD PRESSURE LESS THAN 140/90: Primary | ICD-10-CM

## 2022-08-23 PROCEDURE — 93010 ELECTROCARDIOGRAM REPORT: CPT | Mod: 76 | Performed by: INTERNAL MEDICINE

## 2022-08-23 PROCEDURE — 99213 OFFICE O/P EST LOW 20 MIN: CPT | Mod: 25 | Performed by: NURSE PRACTITIONER

## 2022-08-23 PROCEDURE — 36415 COLL VENOUS BLD VENIPUNCTURE: CPT | Mod: ZL | Performed by: NURSE PRACTITIONER

## 2022-08-23 PROCEDURE — 82040 ASSAY OF SERUM ALBUMIN: CPT | Mod: ZL | Performed by: NURSE PRACTITIONER

## 2022-08-23 PROCEDURE — G0463 HOSPITAL OUTPT CLINIC VISIT: HCPCS

## 2022-08-23 PROCEDURE — 80053 COMPREHEN METABOLIC PANEL: CPT | Mod: ZL | Performed by: NURSE PRACTITIONER

## 2022-08-23 PROCEDURE — 93005 ELECTROCARDIOGRAM TRACING: CPT

## 2022-08-23 RX ORDER — AMLODIPINE BESYLATE 5 MG/1
5 TABLET ORAL DAILY
Qty: 30 TABLET | Refills: 1 | Status: SHIPPED | OUTPATIENT
Start: 2022-08-23 | End: 2022-10-17

## 2022-08-23 ASSESSMENT — ANXIETY QUESTIONNAIRES
4. TROUBLE RELAXING: NOT AT ALL
1. FEELING NERVOUS, ANXIOUS, OR ON EDGE: NOT AT ALL
7. FEELING AFRAID AS IF SOMETHING AWFUL MIGHT HAPPEN: NOT AT ALL
5. BEING SO RESTLESS THAT IT IS HARD TO SIT STILL: NOT AT ALL
2. NOT BEING ABLE TO STOP OR CONTROL WORRYING: NOT AT ALL
3. WORRYING TOO MUCH ABOUT DIFFERENT THINGS: NOT AT ALL
GAD7 TOTAL SCORE: 0
6. BECOMING EASILY ANNOYED OR IRRITABLE: NOT AT ALL
GAD7 TOTAL SCORE: 0

## 2022-08-23 ASSESSMENT — PATIENT HEALTH QUESTIONNAIRE - PHQ9: SUM OF ALL RESPONSES TO PHQ QUESTIONS 1-9: 0

## 2022-08-23 NOTE — NURSING NOTE
"Chief Complaint   Patient presents with     Hypertension       Initial BP (!) 140/100 (BP Location: Right arm, Patient Position: Sitting, Cuff Size: Adult Regular)   Pulse 73   Temp 98.4  F (36.9  C) (Tympanic)   Wt 108.3 kg (238 lb 11.2 oz)   SpO2 96%   BMI 37.69 kg/m   Estimated body mass index is 37.69 kg/m  as calculated from the following:    Height as of 12/28/21: 1.695 m (5' 6.73\").    Weight as of this encounter: 108.3 kg (238 lb 11.2 oz).  Medication Reconciliation: complete  Leann Everett LPN  "

## 2022-08-23 NOTE — PROGRESS NOTES
"  Assessment & Plan      Add Norvasc. He will take at night. He will follow up with PCP in 2 weeks for BP follow up. He will check BP 3 times a week in the am and bring data to follow up appointment.     Recheck CMP today. LFT's elevated on last lab.      Update EKG today.     (I10) Essential hypertension with goal blood pressure less than 140/90  (primary encounter diagnosis)  Comment: check labs and EKG   Plan: Comprehensive metabolic panel (BMP + Alb, Alk         Phos, ALT, AST, Total. Bili, TP), EKG 12-lead         complete w/read - (Clinic Performed),         amLODIPine (NORVASC) 5 MG tablet            BMI:   Estimated body mass index is 37.69 kg/m  as calculated from the following:    Height as of 12/28/21: 1.695 m (5' 6.73\").    Weight as of this encounter: 108.3 kg (238 lb 11.2 oz).   Weight management plan: Discussed healthy diet and exercise guidelines    See Patient Instructions    Return in about 2 weeks (around 9/6/2022) for BP follow up .    Patrica Cool, NP  [facility]   Isaiah is a 71 year old, presenting for the following health issues:    Hypertension      HPI     Hypertension Follow-up      Do you check your blood pressure regularly outside of the clinic? Yes     Are you following a low salt diet? Yes    Are your blood pressures ever more than 140 on the top number (systolic) OR more   than 90 on the bottom number (diastolic), for example 140/90? Yes      How many servings of fruits and vegetables do you eat daily?  0-1    On average, how many sweetened beverages do you drink each day (Examples: soda, juice, sweet tea, etc.  Do NOT count diet or artificially sweetened beverages)?   0    How many days per week do you exercise enough to make your heart beat faster? 5    How many minutes a day do you exercise enough to make your heart beat faster? 20 - 29    How many days per week do you miss taking your medication? 0    He was started on Hyzaar in June 2022 " and his BP has been elevated. He had COVID 6 weeks ago and had started to monitor his BP. His BP has been consistently elevated above 140. He has brought his BP cuff into the clinic previously and it was accurate in comparison.     Isaiah feels like he may be getting a little more winded, but feels it is baseline. He feels it's weight related. Last EKG and stress test was 2015.     Denies family history of heart disease. His father dies at age 69 and is unknown why.       Review of Systems   Constitutional, HEENT, cardiovascular, pulmonary, gi and gu systems are negative, except as otherwise noted.      Objective    BP (!) 140/94 (BP Location: Right arm, Patient Position: Right side, Cuff Size: Adult Regular)   Pulse 73   Temp 98.4  F (36.9  C) (Tympanic)   Wt 108.3 kg (238 lb 11.2 oz)   SpO2 96%   BMI 37.69 kg/m    Body mass index is 37.69 kg/m .  Physical Exam   GENERAL: healthy, alert and no distress  NECK: no adenopathy, no asymmetry, masses, or scars and thyroid normal to palpation  RESP: lungs clear to auscultation - no rales, rhonchi or wheezes  CV: regular rate and rhythm, normal S1 S2, no S3 or S4, no murmur, click or rub, no peripheral edema and peripheral pulses strong  MS: no gross musculoskeletal defects noted, no edema  SKIN: no suspicious lesions or rashes  NEURO: Normal strength and tone, mentation intact and speech normal  PSYCH: mentation appears normal, affect normal/bright

## 2022-08-24 LAB
ALBUMIN SERPL-MCNC: 4.1 G/DL (ref 3.4–5)
ALP SERPL-CCNC: 70 U/L (ref 40–150)
ALT SERPL W P-5'-P-CCNC: 93 U/L (ref 0–70)
ANION GAP SERPL CALCULATED.3IONS-SCNC: 6 MMOL/L (ref 3–14)
AST SERPL W P-5'-P-CCNC: 43 U/L (ref 0–45)
BILIRUB SERPL-MCNC: 0.6 MG/DL (ref 0.2–1.3)
BUN SERPL-MCNC: 22 MG/DL (ref 7–30)
CALCIUM SERPL-MCNC: 9.7 MG/DL (ref 8.5–10.1)
CHLORIDE BLD-SCNC: 107 MMOL/L (ref 94–109)
CO2 SERPL-SCNC: 26 MMOL/L (ref 20–32)
CREAT SERPL-MCNC: 0.96 MG/DL (ref 0.66–1.25)
GFR SERPL CREATININE-BSD FRML MDRD: 85 ML/MIN/1.73M2
GLUCOSE BLD-MCNC: 94 MG/DL (ref 70–99)
POTASSIUM BLD-SCNC: 4.2 MMOL/L (ref 3.4–5.3)
PROT SERPL-MCNC: 8 G/DL (ref 6.8–8.8)
SODIUM SERPL-SCNC: 139 MMOL/L (ref 133–144)

## 2022-10-17 ENCOUNTER — OFFICE VISIT (OUTPATIENT)
Dept: FAMILY MEDICINE | Facility: OTHER | Age: 72
End: 2022-10-17
Attending: FAMILY MEDICINE
Payer: MEDICARE

## 2022-10-17 VITALS
SYSTOLIC BLOOD PRESSURE: 118 MMHG | DIASTOLIC BLOOD PRESSURE: 82 MMHG | BODY MASS INDEX: 37.58 KG/M2 | RESPIRATION RATE: 20 BRPM | HEART RATE: 69 BPM | WEIGHT: 238 LBS | OXYGEN SATURATION: 98 % | TEMPERATURE: 97.3 F

## 2022-10-17 DIAGNOSIS — R35.1 NOCTURIA: ICD-10-CM

## 2022-10-17 DIAGNOSIS — I10 ESSENTIAL HYPERTENSION WITH GOAL BLOOD PRESSURE LESS THAN 140/90: ICD-10-CM

## 2022-10-17 LAB
ANION GAP SERPL CALCULATED.3IONS-SCNC: 7 MMOL/L (ref 7–15)
BUN SERPL-MCNC: 11.5 MG/DL (ref 8–23)
CALCIUM SERPL-MCNC: 9.3 MG/DL (ref 8.8–10.2)
CHLORIDE SERPL-SCNC: 104 MMOL/L (ref 98–107)
CREAT SERPL-MCNC: 0.94 MG/DL (ref 0.67–1.17)
DEPRECATED HCO3 PLAS-SCNC: 26 MMOL/L (ref 22–29)
GFR SERPL CREATININE-BSD FRML MDRD: 86 ML/MIN/1.73M2
GLUCOSE SERPL-MCNC: 110 MG/DL (ref 70–99)
POTASSIUM SERPL-SCNC: 4.3 MMOL/L (ref 3.4–5.3)
PSA SERPL-MCNC: 0.54 NG/ML (ref 0–6.5)
SODIUM SERPL-SCNC: 137 MMOL/L (ref 136–145)

## 2022-10-17 PROCEDURE — 80048 BASIC METABOLIC PNL TOTAL CA: CPT | Mod: ZL | Performed by: FAMILY MEDICINE

## 2022-10-17 PROCEDURE — 91312 COVID-19,PF,PFIZER BOOSTER BIVALENT: CPT

## 2022-10-17 PROCEDURE — 84153 ASSAY OF PSA TOTAL: CPT | Mod: ZL | Performed by: FAMILY MEDICINE

## 2022-10-17 PROCEDURE — 36415 COLL VENOUS BLD VENIPUNCTURE: CPT | Mod: ZL | Performed by: FAMILY MEDICINE

## 2022-10-17 PROCEDURE — G0463 HOSPITAL OUTPT CLINIC VISIT: HCPCS

## 2022-10-17 PROCEDURE — 99213 OFFICE O/P EST LOW 20 MIN: CPT | Performed by: FAMILY MEDICINE

## 2022-10-17 RX ORDER — DULOXETIN HYDROCHLORIDE 60 MG/1
CAPSULE, DELAYED RELEASE ORAL
Qty: 90 CAPSULE | Refills: 3 | Status: SHIPPED | OUTPATIENT
Start: 2022-10-17 | End: 2022-10-24

## 2022-10-17 RX ORDER — AMLODIPINE BESYLATE 5 MG/1
5 TABLET ORAL DAILY
Qty: 90 TABLET | Refills: 3 | Status: SHIPPED | OUTPATIENT
Start: 2022-10-17 | End: 2022-10-24

## 2022-10-17 ASSESSMENT — PAIN SCALES - GENERAL: PAINLEVEL: MODERATE PAIN (4)

## 2022-10-17 NOTE — NURSING NOTE
"Chief Complaint   Patient presents with     Recheck Medication       Initial /82 (BP Location: Right arm, Patient Position: Sitting, Cuff Size: Adult Large)   Pulse 69   Temp 97.3  F (36.3  C) (Tympanic)   Resp 20   Wt 108 kg (238 lb)   SpO2 98%   BMI 37.58 kg/m   Estimated body mass index is 37.58 kg/m  as calculated from the following:    Height as of 12/28/21: 1.695 m (5' 6.73\").    Weight as of this encounter: 108 kg (238 lb).  Medication Reconciliation: complete  Malu Deleon LPN  "

## 2022-10-17 NOTE — PROGRESS NOTES
Assessment & Plan     Essential hypertension with goal blood pressure less than 140/90  Stable - good control with adding Norvasc. Lab ordered. Follow-up for annual in spring   - amLODIPine (NORVASC) 5 MG tablet; Take 1 tablet (5 mg) by mouth daily  - Basic metabolic panel    Nocturia  psa overdue. Will check   - PSA tumor marker                 No follow-ups on file.    Glenn Segovia MD  [facility] - [place] Colon is a 72 year old, presenting for the following health issues:  Recheck Medication      HPI     Hypertension Follow-up      Do you check your blood pressure regularly outside of the clinic? Yes     Are you following a low salt diet? Yes    Are your blood pressures ever more than 140 on the top number (systolic) OR more   than 90 on the bottom number (diastolic), for example 140/90? No    Seen NP [place]  Added Norvasc  BP better   No side effects      Review of Systems   Constitutional, HEENT, cardiovascular, pulmonary, gi and gu systems are negative, except as otherwise noted.      Objective    /82 (BP Location: Right arm, Patient Position: Sitting, Cuff Size: Adult Large)   Pulse 69   Temp 97.3  F (36.3  C) (Tympanic)   Resp 20   Wt 108 kg (238 lb)   SpO2 98%   BMI 37.58 kg/m    Body mass index is 37.58 kg/m .  Physical Exam   GENERAL: healthy, alert and no distress  NECK: no adenopathy, no asymmetry, masses, or scars and thyroid normal to palpation  RESP: lungs clear to auscultation - no rales, rhonchi or wheezes  CV: regular rate and rhythm, normal S1 S2, no S3 or S4, no murmur, click or rub, no peripheral edema and peripheral pulses strong  ABDOMEN: soft, nontender, no hepatosplenomegaly, no masses and bowel sounds normal  MS: no gross musculoskeletal defects noted, no edema

## 2022-10-24 DIAGNOSIS — F43.21 ADJUSTMENT DISORDER WITH DEPRESSED MOOD: Primary | ICD-10-CM

## 2022-10-24 DIAGNOSIS — I10 ESSENTIAL HYPERTENSION WITH GOAL BLOOD PRESSURE LESS THAN 140/90: ICD-10-CM

## 2022-10-24 RX ORDER — AMLODIPINE BESYLATE 5 MG/1
5 TABLET ORAL DAILY
Qty: 90 TABLET | Refills: 0 | Status: SHIPPED | OUTPATIENT
Start: 2022-10-24 | End: 2023-01-05

## 2022-10-24 RX ORDER — DULOXETIN HYDROCHLORIDE 60 MG/1
CAPSULE, DELAYED RELEASE ORAL
Qty: 90 CAPSULE | Refills: 0 | Status: SHIPPED | OUTPATIENT
Start: 2022-10-24 | End: 2023-02-03

## 2022-10-24 NOTE — TELEPHONE ENCOUNTER
Duloxetine 60 MG      Last Written Prescription Date:  10/17/22  Last Fill Quantity: 90,   # refills: 3  Last Office Visit: 10/17/22  Future Office visit:       Routing refill request to provider for review/approval because:  Change in pharmacies.    Amlodipine      Last Written Prescription Date:  10/17/22  Last Fill Quantity: 90,   # refills: 3  Last Office Visit: 10/17/22  Future Office visit:       Routing refill request to provider for review/approval because:  Change in pharmacies

## 2022-10-31 DIAGNOSIS — F43.21 ADJUSTMENT DISORDER WITH DEPRESSED MOOD: ICD-10-CM

## 2022-12-05 ENCOUNTER — MEDICAL CORRESPONDENCE (OUTPATIENT)
Dept: HEALTH INFORMATION MANAGEMENT | Facility: HOSPITAL | Age: 72
End: 2022-12-05

## 2023-01-03 DIAGNOSIS — I10 ESSENTIAL HYPERTENSION WITH GOAL BLOOD PRESSURE LESS THAN 140/90: ICD-10-CM

## 2023-01-05 RX ORDER — AMLODIPINE BESYLATE 5 MG/1
TABLET ORAL
Qty: 90 TABLET | Refills: 2 | Status: SHIPPED | OUTPATIENT
Start: 2023-01-05 | End: 2023-02-03

## 2023-01-31 DIAGNOSIS — I10 ESSENTIAL HYPERTENSION WITH GOAL BLOOD PRESSURE LESS THAN 140/90: ICD-10-CM

## 2023-01-31 DIAGNOSIS — F43.21 ADJUSTMENT DISORDER WITH DEPRESSED MOOD: ICD-10-CM

## 2023-02-02 DIAGNOSIS — F43.21 ADJUSTMENT DISORDER WITH DEPRESSED MOOD: ICD-10-CM

## 2023-02-02 DIAGNOSIS — I10 ESSENTIAL HYPERTENSION WITH GOAL BLOOD PRESSURE LESS THAN 140/90: ICD-10-CM

## 2023-02-02 RX ORDER — AMLODIPINE BESYLATE 5 MG/1
5 TABLET ORAL DAILY
Qty: 90 TABLET | Refills: 3 | Status: CANCELLED | OUTPATIENT
Start: 2023-02-02

## 2023-02-02 RX ORDER — DULOXETIN HYDROCHLORIDE 60 MG/1
CAPSULE, DELAYED RELEASE ORAL
Qty: 90 CAPSULE | Refills: 3 | Status: CANCELLED | OUTPATIENT
Start: 2023-02-02

## 2023-02-02 RX ORDER — DULOXETIN HYDROCHLORIDE 60 MG/1
CAPSULE, DELAYED RELEASE ORAL
Qty: 90 CAPSULE | Refills: 0 | Status: CANCELLED | OUTPATIENT
Start: 2023-02-02

## 2023-02-02 NOTE — TELEPHONE ENCOUNTER
cymbalta      Last Written Prescription Date:  10-24-22  Last Fill Quantity: 90,   # refills: 0  Last Office Visit: 10-17-22  Future Office visit:       norvasc       Last Written Prescription Date:  1-5-23  Last Fill Quantity: 90,   # refills: 2

## 2023-02-03 DIAGNOSIS — F43.21 ADJUSTMENT DISORDER WITH DEPRESSED MOOD: ICD-10-CM

## 2023-02-03 DIAGNOSIS — I10 ESSENTIAL HYPERTENSION WITH GOAL BLOOD PRESSURE LESS THAN 140/90: ICD-10-CM

## 2023-02-03 RX ORDER — AMLODIPINE BESYLATE 5 MG/1
5 TABLET ORAL DAILY
Qty: 90 TABLET | Refills: 3 | Status: SHIPPED | OUTPATIENT
Start: 2023-02-03 | End: 2024-09-12

## 2023-02-03 RX ORDER — DULOXETIN HYDROCHLORIDE 60 MG/1
CAPSULE, DELAYED RELEASE ORAL
Qty: 90 CAPSULE | Refills: 3 | Status: SHIPPED | OUTPATIENT
Start: 2023-02-03 | End: 2023-11-28

## 2023-02-03 RX ORDER — DULOXETIN HYDROCHLORIDE 60 MG/1
CAPSULE, DELAYED RELEASE ORAL
Qty: 90 CAPSULE | Refills: 2 | OUTPATIENT
Start: 2023-02-03

## 2023-02-03 RX ORDER — AMLODIPINE BESYLATE 5 MG/1
5 TABLET ORAL DAILY
Qty: 90 TABLET | Refills: 2 | OUTPATIENT
Start: 2023-02-03

## 2023-02-03 NOTE — TELEPHONE ENCOUNTER
norvasc 5mg      Last Written Prescription Date:  1-5-23  Last Fill Quantity: 90,   # refills: 2  Last Office Visit: 10-  Future Office visit:       Routing refill request to provider for review/approval because:  [facility]

## 2023-06-02 ENCOUNTER — HEALTH MAINTENANCE LETTER (OUTPATIENT)
Age: 73
End: 2023-06-02

## 2023-07-07 DIAGNOSIS — F31.11 BIPOLAR I DISORDER, MOST RECENT EPISODE (OR CURRENT) MANIC, MILD (H): ICD-10-CM

## 2023-07-10 ENCOUNTER — TELEPHONE (OUTPATIENT)
Dept: FAMILY MEDICINE | Facility: OTHER | Age: 73
End: 2023-07-10

## 2023-07-10 DIAGNOSIS — F31.11 BIPOLAR I DISORDER, MOST RECENT EPISODE (OR CURRENT) MANIC, MILD (H): ICD-10-CM

## 2023-07-10 RX ORDER — LITHIUM CARBONATE 300 MG/1
600 CAPSULE ORAL DAILY
Qty: 180 CAPSULE | Refills: 1 | Status: SHIPPED | OUTPATIENT
Start: 2023-07-10 | End: 2023-07-10

## 2023-07-10 RX ORDER — LITHIUM CARBONATE 300 MG/1
600 CAPSULE ORAL DAILY
Qty: 180 CAPSULE | Refills: 1 | Status: SHIPPED | OUTPATIENT
Start: 2023-07-10 | End: 2023-07-11

## 2023-07-10 NOTE — TELEPHONE ENCOUNTER
lithium      Last Written Prescription Date:  7/10/2023  Last Fill Quantity: 180,   # refills: 1  Last Office Visit: 10/17/2022  Future Office visit:       Routing refill request to provider for review/approval because:

## 2023-07-10 NOTE — TELEPHONE ENCOUNTER
Lithium      Last Written Prescription Date:  4.22.22  Last Fill Quantity: #180,   # refills: 3  Last Office Visit: 10.17.22  Future Office visit:       Routing refill request to provider for review/approval because:  Drug not on the G, P or [facility] refill protocol or controlled substance

## 2023-07-10 NOTE — TELEPHONE ENCOUNTER
Reason for call:  Medication    1. Medication Name? Lithium  2. Is this request for a refill? Yes  3. What Pharmacy do you use? [facility] mail order  4. Have you contacted your pharmacy? Yes    5. If yes, when?  (Please note that the turn-around-time for prescriptions is 72 business hours; I am sending your request at this time. SEND TO  [facility]  )  Description: [facility] sent an email to patient stating they need dr snug for refills. Was on hold for triage for 30 min and does not want to wait again. Please refill. Thank you.  Was an appointment offered for this a call? No   Preferred method for responding to this messageTelephone Call  If we cannot reach you directly, may we leave a detailed response at the number you provided? Yes  Can this message wait until your PCP/Provider returns if not available today? pcp in.

## 2023-07-11 ENCOUNTER — TELEPHONE (OUTPATIENT)
Dept: FAMILY MEDICINE | Facility: OTHER | Age: 73
End: 2023-07-11

## 2023-07-11 DIAGNOSIS — F31.11 BIPOLAR I DISORDER, MOST RECENT EPISODE (OR CURRENT) MANIC, MILD (H): ICD-10-CM

## 2023-07-11 RX ORDER — LITHIUM CARBONATE 300 MG/1
600 CAPSULE ORAL DAILY
Qty: 180 CAPSULE | Refills: 1 | Status: SHIPPED | OUTPATIENT
Start: 2023-07-11 | End: 2024-02-02

## 2023-07-11 NOTE — TELEPHONE ENCOUNTER
Patient is calling stating he needs a Rx. Patient states he needs his lithium refilled. Patient has been out for 3 days. Patient called several times to triage and did not want to be transferred to them again. Advised patient I would sent a note back. Patient uses [facility] [place]. [phone].

## 2023-07-13 ENCOUNTER — HOSPITAL ENCOUNTER (EMERGENCY)
Facility: OTHER | Age: 73
Discharge: HOME | End: 2023-07-13
Attending: EMERGENCY MEDICINE | Admitting: EMERGENCY MEDICINE
Payer: COMMERCIAL

## 2023-07-13 VITALS
HEART RATE: 68 BPM | HEIGHT: 69 IN | SYSTOLIC BLOOD PRESSURE: 133 MMHG | RESPIRATION RATE: 18 BRPM | OXYGEN SATURATION: 99 % | WEIGHT: 235 LBS | BODY MASS INDEX: 34.8 KG/M2 | DIASTOLIC BLOOD PRESSURE: 97 MMHG

## 2023-07-13 DIAGNOSIS — R04.0 EPISTAXIS: ICD-10-CM

## 2023-07-13 PROCEDURE — 99283 EMERGENCY DEPT VISIT LOW MDM: CPT | Mod: 25 | Performed by: EMERGENCY MEDICINE

## 2023-07-13 PROCEDURE — 30903 CONTROL OF NOSEBLEED: CPT | Mod: LT | Performed by: EMERGENCY MEDICINE

## 2023-07-13 PROCEDURE — 99283 EMERGENCY DEPT VISIT LOW MDM: CPT | Performed by: EMERGENCY MEDICINE

## 2023-07-13 PROCEDURE — 250N000009 HC RX 250: Performed by: EMERGENCY MEDICINE

## 2023-07-13 PROCEDURE — 99207 PR NO CHARGE LOS: CPT | Performed by: EMERGENCY MEDICINE

## 2023-07-13 RX ORDER — CEPHALEXIN 500 MG/1
500 CAPSULE ORAL 2 TIMES DAILY
Qty: 10 CAPSULE | Refills: 0 | Status: SHIPPED | OUTPATIENT
Start: 2023-07-13 | End: 2023-07-18

## 2023-07-13 RX ORDER — TRANEXAMIC ACID 100 MG/ML
INJECTION, SOLUTION INTRAVENOUS ONCE
Status: COMPLETED | OUTPATIENT
Start: 2023-07-13 | End: 2023-07-13

## 2023-07-13 RX ADMIN — TRANEXAMIC ACID: 1 INJECTION, SOLUTION INTRAVENOUS at 10:00

## 2023-07-13 ASSESSMENT — ACTIVITIES OF DAILY LIVING (ADL): ADLS_ACUITY_SCORE: 33

## 2023-07-13 NOTE — ED PROVIDER NOTES
[facility]  Emergency Department Visit Note    Epistaxis      History of Present Illness     HPI:  Umer Archuleta is a 72 year old old male presenting with epistaxis. This started 30 minutes ago and was precipitated by nothing in particular. Before presenting to the emergency department, the patient tried pressure. The patient does not have a history of trouble with epistaxis in the past. He is not on anticoagulants. No known recent trauma, fever, cough, rhinorrhea, headache or facial pain. No light headedness, syncope, or shortness of breath.    Medications:  Prior to Admission medications    Medication Sig Last Dose Taking? Auth Provider Long Term End Date   cephALEXin (KEFLEX) 500 MG capsule Take 1 capsule (500 mg) by mouth 2 times daily for 5 days  Yes Idalia Howe MD  7/18/23   amLODIPine (NORVASC) 5 MG tablet Take 1 tablet (5 mg) by mouth daily   Glenn Segovia MD Yes    aspirin EC 81 MG tablet Take 81 mg by mouth daily   Reported, Patient     cetirizine (ZYRTEC) 10 MG tablet Take 10 mg by mouth daily  Patient not taking: No sig reported   Reported, Patient     Cholecalciferol (VITAMIN D3) 5000 UNIT/ML LIQD Take 1 drop by mouth   Reported, Patient     DULoxetine (CYMBALTA) 60 MG capsule TAKE 1 CAPSULE DAILY Strength: 60 mg   Glenn Segovia MD Yes    fluticasone (FLONASE) 50 MCG/ACT nasal spray Spray 2 sprays into both nostrils daily  Patient not taking: No sig reported   Glenn Segovia MD     lithium 300 MG capsule Take 2 capsules (600 mg) by mouth daily   Glenn Segovia MD Yes    losartan-hydrochlorothiazide (HYZAAR) 50-12.5 MG tablet Take 1 tablet by mouth daily   Glenn Segovia MD Yes    omeprazole (PRILOSEC) 20 MG DR capsule TAKE 1 CAPSULE DAILY   Glenn Segovia MD     sildenafil (VIAGRA) 100 MG tablet Take 1 tablet (100 mg) by mouth daily as needed (as needed for erectile dysfunction)   Patrica Cool, ASHWINI CNP Yes        Allergies:  No Known  "Allergies    Problem List:  Patient Active Problem List   Diagnosis     Bipolar I disorder, single manic episode (H)     ED (erectile dysfunction)     Nocturia     Tobacco abuse, in remission     Advanced care planning/counseling discussion     ASNHL (asymmetrical sensorineural hearing loss)     Recurrent major depressive disorder, in remission (H)     Benign essential hypertension     Umbilical hernia without obstruction and without gangrene     ACP (advance care planning)     Weight gain     Recurrent major depressive disorder, in full remission (H)     Morbid obesity due to excess calories (H)       Past Medical History:  Past Medical History:   Diagnosis Date     Bipolar I disorder, single manic episode (H) 10/23/2012     Problem list name updated by automated process. Provider to review     ED (erectile dysfunction)      Essential hypertension with goal blood pressure less than 140/90 09/15/2016     Morbid obesity due to excess calories (H) 09/11/2017     GINA (obstructive sleep apnea)     on CPAP     Recurrent major depressive disorder, in remission (H) 09/15/2016     Tobacco abuse, in remission      Umbilical hernia without obstruction and without gangrene 09/15/2016       Past Surgical History:  No past surgical history on file.    Social History:  Social History     Tobacco Use     Smoking status: Former     Smokeless tobacco: Never   Vaping Use     Vaping Use: Never used   Substance Use Topics     Alcohol use: Yes     Comment: rarely     Drug use: Not Currently       Review of Systems:  10 point review of systems obtained and pertinent positive and negative findings noted in HPI. Review of systems otherwise negative.      Physical Exam     Vital signs: BP (!) 136/94   Pulse 68   Resp 18   Ht 1.753 m (5' 9\")   Wt 106.6 kg (235 lb)   SpO2 99%   BMI 34.70 kg/m      Physical Exam:    General: awake and alert, uncomfortable  HEENT:bilateral nare with blood in nare, source of bleeding is not visualized, " blood in posterior oropharynx, atraumatic, no scleral injection, neck supple  Extremities: no deformities, edema, or cyanosis  Skin: warm, dry, no rashes  Neuro: alert, moving extremities x 4, ambulates without difficulty    Medical Decision Making & ED Course     Umer Archuleta is a 72 year old old male presenting with epistaxis. Differential includes anterior epistaxis, posterior epistaxis, anemia, septal hematoma. Given the amount of bleeding in the oropharynx versus the amount anteriorly, this is likely anterior epistaxis. There are no symptoms or exam findings concerning for anemia and patient does not require hemoglobin testing. The patient does not require testing for medication induced or congenital coagulopathy. Epistaxis management as documented below was successful in stopping epistaxis. Given that the patient does have a nasal packing in place, he does require Keflex prophylaxis. He is stable for further outpatient management. Patient given instructions on follow-up and warning signs for which to return to ED. All questions were answered and the patient is comfortable with plan for discharge. The patient was discharged in stable condition.    Procedure Note - Epistaxis Management:     Procedure performed by:  Idalia Howe MD      After administration of TXA, the patient underwent nasal packing in left nare with rhino rocket.  Hemostasis was achieved after placement.  The patient tolerated this procedure with moderate discomfort.    .    Diagnosis & Disposition     Diagnosis:  1. Epistaxis      Disposition:  Home    MD Carley Min Theresa M, MD  07/13/23 6409

## 2023-07-13 NOTE — ED TRIAGE NOTES
"Pt presents for a nosebleed, running in the back of the throat. Not on blood thinners, takes baby ASA. Active bleeding in triage, onset about 30 minutes prior to arrival.     BP (!) 136/94   Pulse 68   Resp 18   Ht 1.753 m (5' 9\")   Wt 106.6 kg (235 lb)   SpO2 99%   BMI 34.70 kg/m         Triage Assessment     Row Name 07/13/23 0922       Triage Assessment (Adult)    Airway WDL WDL  Posterior Nosebleed       Respiratory WDL    Respiratory WDL WDL       Skin Circulation/Temperature WDL    Skin Circulation/Temperature WDL WDL       Cardiac WDL    Cardiac WDL WDL       Peripheral/Neurovascular WDL    Peripheral Neurovascular WDL WDL       Cognitive/Neuro/Behavioral WDL    Cognitive/Neuro/Behavioral WDL WDL              "

## 2023-07-13 NOTE — ED NOTES
Pt has occasional bilateral nose oozing, pt ate some toast  
[place] website --- [email or web address] Website --- [email or web address]

## 2023-07-14 ENCOUNTER — TELEPHONE (OUTPATIENT)
Dept: FAMILY MEDICINE | Facility: OTHER | Age: 73
End: 2023-07-14

## 2023-07-14 NOTE — TELEPHONE ENCOUNTER
Those blood pressures are not in the severe range, just minimally above normal.  He is due for a follow-up with his primary care provider, looks like he missed his last appointment.  If he is having the worst headache of his life, weakness, speech difficulty, red eyes and drainage, double vision, fainting, then he should be seen again in the emergency department.

## 2023-07-14 NOTE — TELEPHONE ENCOUNTER
Patient was in the ER yesterday for a nose bleed and elevated BP.  Today his pressure was 143/102 at 11:00 and 135/92 at 1:00.  He is prescribed losartan and amlodipine one tab a day.  He took each at 11:00 and again at 1:00.  He is now having headache, glassy eyes and red eyes.  Denies chest pain and dizziness and sob.    Patient is wondering what he should do?      [phone]  Venita (spouse)    losartan-hydrochlorothiazide (HYZAAR) 50-12.5 MG tablet 90 tablet 3 6/13/2022  No   Sig - Route: Take 1 tablet by mouth daily - Oral   Sent to pharmacy as: Losartan Potassium-HCTZ 50-12.5 MG Oral Tablet (HYZAAR)   Class: E-Prescribe   Order: [id number]   E-Prescribing Status: Receipt confirmed by pharmacy (6/13/2022 10:15 AM CDT)     amLODIPine (NORVASC) 5 MG tablet 90 tablet 3 2/3/2023  No   Sig - Route: Take 1 tablet (5 mg) by mouth daily - Oral   Sent to pharmacy as: amLODIPine Besylate 5 MG Oral Tablet (NORVASC)   Class: E-Prescribe   Order: [id number]   E-Prescribing Status: Receipt confirmed by pharmacy (2/3/2023  2:48 PM CST)

## 2023-07-14 NOTE — TELEPHONE ENCOUNTER
ER F/U  7/13/23     Epistaxis  Clinical impression  Epistaxis  Chief complaint     Please call to overbook ER F/U      Medical Decision Making & ED Course      Umer Archuleta is a 72 year old old male presenting with epistaxis. Differential includes anterior epistaxis, posterior epistaxis, anemia, septal hematoma. Given the amount of bleeding in the oropharynx versus the amount anteriorly, this is likely anterior epistaxis. There are no symptoms or exam findings concerning for anemia and patient does not require hemoglobin testing. The patient does not require testing for medication induced or congenital coagulopathy. Epistaxis management as documented below was successful in stopping epistaxis. Given that the patient does have a nasal packing in place, he does require Keflex prophylaxis. He is stable for further outpatient management. Patient given instructions on follow-up and warning signs for which to return to ED. All questions were answered and the patient is comfortable with plan for discharge. The patient was discharged in stable condition.     Procedure Note - Epistaxis Management:      Procedure performed by:  Idalia Howe MD        After administration of TXA, the patient underwent nasal packing in left nare with rhino rocket.  Hemostasis was achieved after placement.  The patient tolerated this procedure with moderate discomfort.

## 2023-07-19 ENCOUNTER — OFFICE VISIT (OUTPATIENT)
Dept: FAMILY MEDICINE | Facility: OTHER | Age: 73
End: 2023-07-19
Attending: FAMILY MEDICINE
Payer: COMMERCIAL

## 2023-07-19 ENCOUNTER — LAB (OUTPATIENT)
Dept: FAMILY MEDICINE | Facility: OTHER | Age: 73
End: 2023-07-19

## 2023-07-19 VITALS
BODY MASS INDEX: 34.7 KG/M2 | SYSTOLIC BLOOD PRESSURE: 122 MMHG | OXYGEN SATURATION: 98 % | RESPIRATION RATE: 18 BRPM | WEIGHT: 235 LBS | HEART RATE: 66 BPM | DIASTOLIC BLOOD PRESSURE: 87 MMHG | TEMPERATURE: 97.2 F

## 2023-07-19 VITALS
OXYGEN SATURATION: 97 % | SYSTOLIC BLOOD PRESSURE: 134 MMHG | HEART RATE: 75 BPM | RESPIRATION RATE: 18 BRPM | DIASTOLIC BLOOD PRESSURE: 86 MMHG

## 2023-07-19 DIAGNOSIS — R35.1 NOCTURIA: ICD-10-CM

## 2023-07-19 DIAGNOSIS — R04.0 EPISTAXIS: Primary | ICD-10-CM

## 2023-07-19 DIAGNOSIS — Z12.11 COLON CANCER SCREENING: ICD-10-CM

## 2023-07-19 DIAGNOSIS — I10 BENIGN ESSENTIAL HYPERTENSION: ICD-10-CM

## 2023-07-19 DIAGNOSIS — I10 BENIGN ESSENTIAL HYPERTENSION: Primary | ICD-10-CM

## 2023-07-19 DIAGNOSIS — J01.10 ACUTE NON-RECURRENT FRONTAL SINUSITIS: ICD-10-CM

## 2023-07-19 DIAGNOSIS — J32.9 CHRONIC CONGESTION OF PARANASAL SINUS: ICD-10-CM

## 2023-07-19 DIAGNOSIS — R73.03 PREDIABETES: ICD-10-CM

## 2023-07-19 DIAGNOSIS — F30.9 BIPOLAR I DISORDER, SINGLE MANIC EPISODE (H): ICD-10-CM

## 2023-07-19 DIAGNOSIS — F33.42 RECURRENT MAJOR DEPRESSIVE DISORDER, IN FULL REMISSION (H): ICD-10-CM

## 2023-07-19 DIAGNOSIS — E66.01 MORBID OBESITY DUE TO EXCESS CALORIES (H): ICD-10-CM

## 2023-07-19 DIAGNOSIS — F33.40 RECURRENT MAJOR DEPRESSIVE DISORDER, IN REMISSION (H): ICD-10-CM

## 2023-07-19 LAB
ALBUMIN SERPL BCG-MCNC: 4.1 G/DL (ref 3.5–5.2)
ALP SERPL-CCNC: 68 U/L (ref 40–129)
ALT SERPL W P-5'-P-CCNC: 117 U/L (ref 0–70)
ANION GAP SERPL CALCULATED.3IONS-SCNC: 11 MMOL/L (ref 7–15)
AST SERPL W P-5'-P-CCNC: 56 U/L (ref 0–45)
BASOPHILS # BLD AUTO: 0 10E3/UL (ref 0–0.2)
BASOPHILS NFR BLD AUTO: 1 %
BILIRUB SERPL-MCNC: 0.5 MG/DL
BUN SERPL-MCNC: 16.9 MG/DL (ref 8–23)
CALCIUM SERPL-MCNC: 9.4 MG/DL (ref 8.8–10.2)
CHLORIDE SERPL-SCNC: 106 MMOL/L (ref 98–107)
CHOLEST SERPL-MCNC: 135 MG/DL
CREAT SERPL-MCNC: 1.02 MG/DL (ref 0.67–1.17)
DEPRECATED HCO3 PLAS-SCNC: 25 MMOL/L (ref 22–29)
EOSINOPHIL # BLD AUTO: 0.5 10E3/UL (ref 0–0.7)
EOSINOPHIL NFR BLD AUTO: 7 %
ERYTHROCYTE [DISTWIDTH] IN BLOOD BY AUTOMATED COUNT: 14.3 % (ref 10–15)
EST. AVERAGE GLUCOSE BLD GHB EST-MCNC: 123 MG/DL
GFR SERPL CREATININE-BSD FRML MDRD: 78 ML/MIN/1.73M2
GLUCOSE SERPL-MCNC: 120 MG/DL (ref 70–99)
HBA1C MFR BLD: 5.9 %
HCT VFR BLD AUTO: 43.3 % (ref 40–53)
HDLC SERPL-MCNC: 42 MG/DL
HGB BLD-MCNC: 14.4 G/DL (ref 13.3–17.7)
IMM GRANULOCYTES # BLD: 0 10E3/UL
IMM GRANULOCYTES NFR BLD: 0 %
LDLC SERPL CALC-MCNC: 67 MG/DL
LITHIUM SERPL-SCNC: 0.2 MMOL/L (ref 0.6–1.2)
LYMPHOCYTES # BLD AUTO: 2.1 10E3/UL (ref 0.8–5.3)
LYMPHOCYTES NFR BLD AUTO: 30 %
MCH RBC QN AUTO: 31 PG (ref 26.5–33)
MCHC RBC AUTO-ENTMCNC: 33.3 G/DL (ref 31.5–36.5)
MCV RBC AUTO: 93 FL (ref 78–100)
MONOCYTES # BLD AUTO: 0.6 10E3/UL (ref 0–1.3)
MONOCYTES NFR BLD AUTO: 9 %
NEUTROPHILS # BLD AUTO: 3.9 10E3/UL (ref 1.6–8.3)
NEUTROPHILS NFR BLD AUTO: 53 %
NONHDLC SERPL-MCNC: 93 MG/DL
NRBC # BLD AUTO: 0 10E3/UL
NRBC BLD AUTO-RTO: 0 /100
PLATELET # BLD AUTO: 282 10E3/UL (ref 150–450)
POTASSIUM SERPL-SCNC: 4.1 MMOL/L (ref 3.4–5.3)
PROT SERPL-MCNC: 7 G/DL (ref 6.4–8.3)
PSA SERPL DL<=0.01 NG/ML-MCNC: 0.73 NG/ML (ref 0–6.5)
RBC # BLD AUTO: 4.65 10E6/UL (ref 4.4–5.9)
SODIUM SERPL-SCNC: 142 MMOL/L (ref 136–145)
TRIGL SERPL-MCNC: 128 MG/DL
TSH SERPL DL<=0.005 MIU/L-ACNC: 2.76 UIU/ML (ref 0.3–4.2)
WBC # BLD AUTO: 7.2 10E3/UL (ref 4–11)

## 2023-07-19 PROCEDURE — G0463 HOSPITAL OUTPT CLINIC VISIT: HCPCS

## 2023-07-19 PROCEDURE — 83036 HEMOGLOBIN GLYCOSYLATED A1C: CPT | Mod: ZL | Performed by: FAMILY MEDICINE

## 2023-07-19 PROCEDURE — 84153 ASSAY OF PSA TOTAL: CPT | Mod: ZL | Performed by: FAMILY MEDICINE

## 2023-07-19 PROCEDURE — 99207 PR NO CHARGE NURSE ONLY: CPT

## 2023-07-19 PROCEDURE — 80053 COMPREHEN METABOLIC PANEL: CPT | Mod: ZL | Performed by: FAMILY MEDICINE

## 2023-07-19 PROCEDURE — 80178 ASSAY OF LITHIUM: CPT | Mod: ZL | Performed by: FAMILY MEDICINE

## 2023-07-19 PROCEDURE — 99214 OFFICE O/P EST MOD 30 MIN: CPT | Performed by: FAMILY MEDICINE

## 2023-07-19 PROCEDURE — 85025 COMPLETE CBC W/AUTO DIFF WBC: CPT | Mod: ZL | Performed by: FAMILY MEDICINE

## 2023-07-19 PROCEDURE — 80061 LIPID PANEL: CPT | Mod: ZL | Performed by: FAMILY MEDICINE

## 2023-07-19 PROCEDURE — 36415 COLL VENOUS BLD VENIPUNCTURE: CPT | Mod: ZL | Performed by: FAMILY MEDICINE

## 2023-07-19 PROCEDURE — 84443 ASSAY THYROID STIM HORMONE: CPT | Mod: ZL | Performed by: FAMILY MEDICINE

## 2023-07-19 ASSESSMENT — PAIN SCALES - GENERAL: PAINLEVEL: NO PAIN (0)

## 2023-07-19 NOTE — PROGRESS NOTES
Patient in clinic for outpatient BP reading.   The patient rested in the room for 5 minutes before the BP was taken  The patient was started on a new medication - No,  Medication - na  Has the patient has any side effect due to medications - No  Do you take your Blood Pressure at home - Yes    The BP reading today was 134/93 and pulse was 75 - Patient's B/P machine.   Blood Pressure within normal limits  - No, states that he has a sinus headache    The BP reading today was 134/86 and pulse was 75.   Blood Pressure within normal limits  - Yes      BP Readings from Last 6 Encounters:   07/19/23 122/87   07/13/23 (!) 133/97   10/17/22 118/82   08/23/22 (!) 140/94   06/13/22 136/88   05/16/22 (!) 148/96

## 2023-07-19 NOTE — PROGRESS NOTES
Assessment & Plan     Epistaxis  Resolved but lots of nasal issues. Will refer   - Adult ENT  Referral; Future    Benign essential hypertension  Pt first needs to make sure BP cuff accurate.  Has stated DBP higher /SBP ok   Pt to check his cuff first then will go from there   - CBC with Platelets & Differential  - Comprehensive metabolic panel  - Lipid Profile  - Hemoglobin A1c  - TSH    Bipolar I disorder, single manic episode (H)  Stable overall but more lethargic last several days - Continue current medications and behavioral changes.   - CBC with Platelets & Differential  - Comprehensive metabolic panel  - Lipid Profile  - Hemoglobin A1c  - TSH  - Lithium level    Recurrent major depressive disorder, in full remission (H)  Stable. Continue current medications and behavioral changes.   - CBC with Platelets & Differential  - Comprehensive metabolic panel  - Lipid Profile  - Hemoglobin A1c  - TSH    Recurrent major depressive disorder, in remission (H)  As above    Morbid obesity due to excess calories (H)  Discussed. Pt is going to or started to go to exercise class. Needs to watch carbs due to prediabetes   - CBC with Platelets & Differential  - Comprehensive metabolic panel  - Lipid Profile  - Hemoglobin A1c  - TSH    Prediabetes  As above. Number rising   - CBC with Platelets & Differential  - Comprehensive metabolic panel  - Lipid Profile  - Hemoglobin A1c  - TSH    Nocturia  Stable psa ok  - CBC with Platelets & Differential  - Comprehensive metabolic panel  - Lipid Profile  - Hemoglobin A1c  - TSH  - PSA tumor marker    Colon cancer screening  Discussed will order Cologuard   - CBC with Platelets & Differential  - Comprehensive metabolic panel  - Lipid Profile  - Hemoglobin A1c  - TSH  - COLOGUARD([employer]); Future    Acute non-recurrent frontal sinusitis  Will treat and see if HA and lethargy bettter   - amoxicillin-clavulanate (AUGMENTIN) 875-125 MG tablet; Take 1 tablet by mouth 2 times  "daily    Chronic congestion of paranasal sinus  As above . Seed to ENT   - Adult ENT  Referral; Future           MED REC REQUIRED  Post Medication Reconciliation Status: discharge medications reconciled, continue medications without change  BMI:   Estimated body mass index is 34.7 kg/m  as calculated from the following:    Height as of 7/13/23: 1.753 m (5' 9\").    Weight as of this encounter: 106.6 kg (235 lb).   Weight management plan: Discussed healthy diet and exercise guidelines        No follow-ups on file.    Glenn Segovia MD  [facility] - RUPERT Colon is a 72 year old, presenting for the following health issues:  ER F/U        7/19/2023     7:47 AM   Additional Questions   Roomed by shirley cruz   Accompanied by wife     HPI     ED/UC Followup:    Facility:  [facility]  Date of visit: 7/13/2023  Reason for visit: epistaxis  Current Status: feeling lethargic, no bloody noses since, feels plugged up, headaches the past 5 days,  patient's BP has been elevated at home but does not know if it is calibrated correctly.   Recent - last 4-5 days more lethargic and tired - sleeping much more     Having increase sinus issues  Always congested  Off Flonase and Zyrtec for awhile now    HA last several days  Frontal HA   Rare cough - much better after stopping ACEI        Pt is overdue for annual below. NS last appt.      Hypertension Follow-up      Do you check your blood pressure regularly outside of the clinic? Yes     Are you following a low salt diet? Yes    Are your blood pressures ever more than 140 on the top number (systolic) OR more   than 90 on the bottom number (diastolic), for example 140/90? Yes   Most times SBP in 130 but DBP higher    Had Bloody nose - seen in ER.   Overdue for F/U    Depression Followup    How are you doing with your depression since your last visit? No change    Are you having other symptoms that might be associated with depression? No    Have " you had a significant life event?  No     Are you feeling anxious or having panic attacks?   No    Do you have any concerns with your use of alcohol or other drugs? No    Social History     Tobacco Use     Smoking status: Former     Smokeless tobacco: Never   Vaping Use     Vaping Use: Never used   Substance Use Topics     Alcohol use: Yes     Comment: rarely     Drug use: Not Currently         11/26/2018    10:00 AM 3/4/2021     9:00 AM 8/23/2022     2:00 PM   PHQ   PHQ-9 Total Score 1 0 0   Q9: Thoughts of better off dead/self-harm past 2 weeks Not at all Not at all Not at all         11/26/2018    10:00 AM 3/4/2021     9:00 AM 8/23/2022     2:00 PM   KACIE-7 SCORE   Total Score 1 0 0           Review of Systems   Constitutional, HEENT, cardiovascular, pulmonary, gi and gu systems are negative, except as otherwise noted.      Objective    /87 (BP Location: Left arm, Patient Position: Sitting, Cuff Size: Adult Large)   Pulse 66   Temp 97.2  F (36.2  C) (Tympanic)   Resp 18   Wt 106.6 kg (235 lb)   SpO2 98%   BMI 34.70 kg/m    Body mass index is 34.7 kg/m .  Physical Exam   GENERAL: healthy, alert and no distress  EYES: Eyes grossly normal to inspection, PERRL and conjunctivae and sclerae normal  HENT: ear canals and TM's normal, nose and mouth without ulcers or lesions  NECK: no adenopathy, no asymmetry, masses, or scars and thyroid normal to palpation  RESP: lungs clear to auscultation - no rales, rhonchi or wheezes  CV: regular rate and rhythm, normal S1 S2, no S3 or S4, no murmur, click or rub, no peripheral edema and peripheral pulses strong  ABDOMEN: soft, nontender, no hepatosplenomegaly, no masses and bowel sounds normal   (male): normal male genitalia without lesions or urethral discharge, no hernia  MS: no gross musculoskeletal defects noted, no edema  SKIN: no suspicious lesions or rashes  NEURO: Normal strength and tone, mentation intact and speech normal  PSYCH: mentation appears normal,  affect normal/bright  LYMPH: no cervical, supraclavicular, axillary, or inguinal adenopathy        Results for orders placed or performed in visit on 07/19/23   Comprehensive metabolic panel     Status: Abnormal   Result Value Ref [facility]    Sodium 142 136 - 145 mmol/L    Potassium 4.1 3.4 - 5.3 mmol/L    Chloride 106 98 - 107 mmol/L    Carbon Dioxide (CO2) 25 22 - 29 mmol/L    Anion Gap 11 7 - 15 mmol/L    Urea Nitrogen 16.9 8.0 - 23.0 mg/dL    Creatinine 1.02 0.67 - 1.17 mg/dL    Calcium 9.4 8.8 - 10.2 mg/dL    Glucose 120 (H) 70 - 99 mg/dL    Alkaline Phosphatase 68 40 - 129 U/L    AST 56 (H) 0 - 45 U/L     (H) 0 - 70 U/L    Protein Total 7.0 6.4 - 8.3 g/dL    Albumin 4.1 3.5 - 5.2 g/dL    Bilirubin Total 0.5 <=1.2 mg/dL    GFR Estimate 78 >60 mL/min/1.73m2   Lipid Profile     Status: Normal   Result Value Ref [facility]    Cholesterol 135 <200 mg/dL    Triglycerides 128 <150 mg/dL    Direct Measure HDL 42 >=40 mg/dL    LDL Cholesterol Calculated 67 <=100 mg/dL    Non HDL Cholesterol 93 <130 mg/dL    Narrative    Cholesterol  Desirable:  <200 mg/dL    Triglycerides  Normal:  Less than 150 mg/dL  Borderline High:  150-199 mg/dL  High:  200-499 mg/dL  Very High:  Greater than or equal to 500 mg/dL    Direct Measure HDL  Female:  Greater than or equal to 50 mg/dL   Male:  Greater than or equal to 40 mg/dL    LDL Cholesterol  Desirable:  <100mg/dL  Above Desirable:  100-129 mg/dL   Borderline High:  130-159 mg/dL   High:  160-189 mg/dL   Very High:  >= 190 mg/dL    Non HDL Cholesterol  Desirable:  130 mg/dL  Above Desirable:  130-159 mg/dL  Borderline High:  160-189 mg/dL  High:  190-219 mg/dL  Very High:  Greater than or equal to 220 mg/dL   Hemoglobin A1c     Status: Abnormal   Result Value Ref [facility]    Estimated Average Glucose 123 mg/dL    Hemoglobin A1C 5.9 (H) <5.7 %   TSH     Status: Normal   Result Value Ref [facility]    TSH 2.76 0.30 - 4.20 uIU/mL   PSA tumor marker     Status: Normal   Result Value Ref [facility]     PSA Tumor Marker 0.73 0.00 - 6.50 ng/mL    Narrative    This result is obtained using the Roche Elecsys total PSA method on the sohan e601 immunoassay analyzer. Results obtained with different assay methods or kits cannot be used interchangeably.   CBC with platelets and differential     Status: None   Result Value Ref [facility]    WBC Count 7.2 4.0 - 11.0 10e3/uL    RBC Count 4.65 4.40 - 5.90 10e6/uL    Hemoglobin 14.4 13.3 - 17.7 g/dL    Hematocrit 43.3 40.0 - 53.0 %    MCV 93 78 - 100 fL    MCH 31.0 26.5 - 33.0 pg    MCHC 33.3 31.5 - 36.5 g/dL    RDW 14.3 10.0 - 15.0 %    Platelet Count 282 150 - 450 10e3/uL    % Neutrophils 53 %    % Lymphocytes 30 %    % Monocytes 9 %    % Eosinophils 7 %    % Basophils 1 %    % Immature Granulocytes 0 %    NRBCs per 100 WBC 0 <1 /100    Absolute Neutrophils 3.9 1.6 - 8.3 10e3/uL    Absolute Lymphocytes 2.1 0.8 - 5.3 10e3/uL    Absolute Monocytes 0.6 0.0 - 1.3 10e3/uL    Absolute Eosinophils 0.5 0.0 - 0.7 10e3/uL    Absolute Basophils 0.0 0.0 - 0.2 10e3/uL    Absolute Immature Granulocytes 0.0 <=0.4 10e3/uL    Absolute NRBCs 0.0 10e3/uL   CBC with Platelets & Differential     Status: None    Narrative    The following orders were created for panel order CBC with Platelets & Differential.  Procedure                               Abnormality         Status                     ---------                               -----------         ------                     CBC with platelets and d...[[id number]]                      Final result                 Please view results for these tests on the individual orders.

## 2023-08-03 NOTE — PATIENT INSTRUCTIONS
Use Ocean nasal spray 4 times a day and Aquafor 4 times a day  If you start bleeding, spray with Afrin and clamp your nose.       Thank you for allowing Niyah Almanza and our ENT team to participate in your care.  If your medications are too expensive, please give the nurse a call.  We can possibly change this medication.  If you have a scheduling or an appointment question please contact our Health Unit Coordinator at their direct line [phone] ext [phone].   ALL nursing questions or concerns can be directed to your ENT nurse at: [phone] - [place]

## 2023-08-06 ENCOUNTER — HOSPITAL ENCOUNTER (EMERGENCY)
Facility: OTHER | Age: 73
Discharge: HOME | End: 2023-08-06
Attending: PHYSICIAN ASSISTANT | Admitting: PHYSICIAN ASSISTANT
Payer: COMMERCIAL

## 2023-08-06 VITALS
RESPIRATION RATE: 16 BRPM | BODY MASS INDEX: 35.61 KG/M2 | SYSTOLIC BLOOD PRESSURE: 166 MMHG | OXYGEN SATURATION: 98 % | DIASTOLIC BLOOD PRESSURE: 97 MMHG | WEIGHT: 235 LBS | HEIGHT: 68 IN | TEMPERATURE: 97 F | HEART RATE: 78 BPM

## 2023-08-06 DIAGNOSIS — R04.0 LEFT-SIDED EPISTAXIS: ICD-10-CM

## 2023-08-06 PROCEDURE — 30903 CONTROL OF NOSEBLEED: CPT | Mod: LT | Performed by: PHYSICIAN ASSISTANT

## 2023-08-06 PROCEDURE — 99282 EMERGENCY DEPT VISIT SF MDM: CPT | Mod: 25 | Performed by: PHYSICIAN ASSISTANT

## 2023-08-06 PROCEDURE — 99207 PR NO CHARGE LOS: CPT | Performed by: PHYSICIAN ASSISTANT

## 2023-08-06 PROCEDURE — 99282 EMERGENCY DEPT VISIT SF MDM: CPT | Performed by: PHYSICIAN ASSISTANT

## 2023-08-06 NOTE — ED TRIAGE NOTES
Pt arrives via private vehicle with c/o bloody nose for five minutes. Pt states he had one two weeks ago and needed a rhino rocket in for three days. Pt given nose clamp in triage. Denies blood thinners, denies lightheadedness or dizziness.      Triage Assessment       Row Name 08/06/23 0525       Triage Assessment (Adult)    Airway WDL WDL       Respiratory WDL    Respiratory WDL WDL       Skin Circulation/Temperature WDL    Skin Circulation/Temperature WDL WDL       Cardiac WDL    Cardiac WDL WDL       Peripheral/Neurovascular WDL    Peripheral Neurovascular WDL WDL       Cognitive/Neuro/Behavioral WDL    Cognitive/Neuro/Behavioral WDL WDL

## 2023-08-06 NOTE — DISCHARGE INSTRUCTIONS
-Follow-up with ENT tomorrow as scheduled.  -Return to the ER if any return of bleeding that cannot be controlled at home

## 2023-08-06 NOTE — ED NOTES
Pt's packing removed, clot noted. Pt blew his nose. Minimal bleeding noted. Nose clip in place.   Yes

## 2023-08-06 NOTE — ED PROVIDER NOTES
"      EMERGENCY DEPARTMENT ENCOUNTER      NAME: Umer Archuleta  AGE: 72 year old male  YOB: 1950  MRN: [id number]  EVALUATION DATE & TIME: 8/6/2023  5:17 PM    PCP: Glenn Segovia    ED PROVIDER: Jaime Mcguire PA-C       CHIEF COMPLAINT:  Chief Complaint   Patient presents with    Epistaxis       FINAL IMPRESSION:  1. Left-sided epistaxis        ED COURSE, MEDICAL DECISION MAKING, ASSESSMENT, AND PLAN:      The patient was interviewed and examined.  HPI and physical exam as below.  Differential diagnosis and MDM Key Documentation Elements as below.  Vitals and triage note were reviewed.  BP (!) 166/97   Pulse 78   Temp 97  F (36.1  C)   Resp 16   Ht 1.727 m (5' 8\")   Wt 106.6 kg (235 lb)   SpO2 98%   BMI 35.73 kg/m      Overall Impression/Treatment Plan/Discharge Info/Follow-up/Medical Necessity for Admission:  Umer Archuleta is a pleasant 72 year old male who presents to the ER today for concerns of left-sided epistaxis.  Started 5 minutes prior to arrival to the ER.  Patient was bending over when he noticed bleeding.  Patient applied direct pressure.  Patient was seen here in the ER on 7/13/2023 for bleeding from the same nostril.  A Rhino Rocket was placed.  Patient removed Rhino Rocket at home with no other return of bleeding.  He does have a follow-up appointment scheduled with ENT for tomorrow.  Currently patient denying any headache, fever, injury, lightheadedness, or any other symptom.  No blood thinners.    Differential includes but is not limited to left-sided epistaxis, nasal injury, sinusitis    Patient today was afebrile with without a blood pressure.  Patient was in no acute distress.  Patient with no active epistaxis.  Clot noted left anterior nostril more bleeding was previously coming from.  Currently no indication for Rhino Rocket placement.  Exam otherwise benign    Assessment/plan:  Left epistaxis  -Resolved upon initial presentation.  Patient was observed for " 20 minutes with no return of bleeding.  Currently no indication for interventions.  Patient does have a follow-up appointment with ENT tomorrow which she is encouraged to keep.  Return to the ER for any worsening of symptoms    Reassessments, Medications, Interventions, & Response to Treatments:  No occurrence of bleeding here in the ER after observation for over 20 minutes.  Discussed treatment plan and follow-up    Consultations:  None    Decision Rules, Medical Calculators, and Risk Stratification Tools:  None    MDM Key Documentation Elements for Patient's Evaluation:  Differential diagnosis to include high risk considerations: As above  Escalation to admission/observation considered: Admission/observation considered, but patient does not meet admission criteria  Discussions and management with other clinicians:    3a. Independent interpretation of testing performed by another health professional:  -No  3b. Discussion of management or test interpretation with another health professional: -No  Independent interpretation of tests:  Ordering and/or review of 0 test(s)  Discussion of test interpretations with radiology:  No  History obtained from source other than patient or assessment requiring an independent historian:  No  Review of non-ED/external records:  review of 1 records  Diagnostic tests considered but not ultimately performed/deferred:  - CBC  Prescription medications considered but not prescribed:  -Antibiotics  Chronic conditions affecting care:  -Hypertension  Care affected by social determinants of health:  -None    The patient's management involved:   - Low acuity visit    Pertinent Labs & Imaging studies reviewed. (See chart for details)     No results found for: ABORH      A shared decision making model was used. Time was taken to answer all questions.  Patient and/or associated parties understood and were agreeable to treatment plan.  Plan and all results were discussed. Warning signs and close  return precautions to return to the ED given. Copy of results given. Discharged in stable condition. Discharged with discharge instructions outlining plan for further care and follow up.        PPE worn during patient evaluation:  Mask: Yes, surgical  Eye Protection: No  Gown: No  Hair cover: No  Face Shield: No  Patient wearing a mask: yes      MEDICATIONS GIVEN IN THE EMERGENCY:  Medications - No data to display    NEW PRESCRIPTIONS STARTED AT TODAY'S ER VISIT:  New Prescriptions    No medications on file          =================================================================    HPI  Umer Archuleta is a pleasant 72 year old male who presents to the ER today for concerns of left-sided epistaxis.  Started 5 minutes prior to arrival to the ER.  Patient was bending over when he noticed bleeding.  Patient applied direct pressure.  Patient was seen here in the ER on 7/13/2023 for bleeding from the same nostril.  A Rhino Rocket was placed.  Patient removed Rhino Rocket at home with no other return of bleeding.  He does have a follow-up appointment scheduled with ENT for tomorrow.  Currently patient denying any headache, fever, injury, lightheadedness, or any other symptom.  No blood thinners.      REVIEW OF SYSTEMS   Review of Systems  As above, otherwise ROS is unremarkable.      PAST MEDICAL HISTORY:  Past Medical History:   Diagnosis Date    Bipolar I disorder, single manic episode (H) 10/23/2012     Problem list name updated by automated process. Provider to review    ED (erectile dysfunction)     Essential hypertension with goal blood pressure less than 140/90 09/15/2016    Morbid obesity due to excess calories (H) 09/11/2017    GINA (obstructive sleep apnea)     on CPAP    Recurrent major depressive disorder, in remission (H) 09/15/2016    Tobacco abuse, in remission     Umbilical hernia without obstruction and without gangrene 09/15/2016       PAST SURGICAL HISTORY:  History reviewed. No pertinent surgical  "history.    CURRENT MEDICATIONS:    Current Outpatient Medications   Medication Instructions    amLODIPine (NORVASC) 5 mg, Oral, DAILY    amoxicillin-clavulanate (AUGMENTIN) 875-125 MG tablet 1 tablet, Oral, 2 TIMES DAILY    aspirin 81 mg, Oral, DAILY    Cholecalciferol (VITAMIN D3) 5000 UNIT/ML LIQD 1 drop, Oral    DULoxetine (CYMBALTA) 60 MG capsule TAKE 1 CAPSULE DAILY Strength: 60 mg    lithium 600 mg, Oral, DAILY    losartan-hydrochlorothiazide (HYZAAR) 50-12.5 MG tablet 1 tablet, Oral, DAILY    omeprazole (PRILOSEC) 20 MG DR capsule TAKE 1 CAPSULE DAILY    sildenafil (VIAGRA) 100 mg, Oral, DAILY PRN       ALLERGIES:  No Known Allergies    FAMILY HISTORY:  History reviewed. No pertinent family history.    SOCIAL HISTORY:   Social History     Socioeconomic History    Marital status:      Spouse name: None    Number of children: None    Years of education: None    Highest education level: None   Tobacco Use    Smoking status: Former    Smokeless tobacco: Never   Vaping Use    Vaping Use: Never used   Substance and Sexual Activity    Alcohol use: Yes     Comment: rarely    Drug use: Not Currently    Sexual activity: Not Currently       PHYSICAL EXAM    VITAL SIGNS: BP (!) 166/97   Pulse 78   Temp 97  F (36.1  C)   Resp 16   Ht 1.727 m (5' 8\")   Wt 106.6 kg (235 lb)   SpO2 98%   BMI 35.73 kg/m      Patient Vitals for the past 24 hrs:   BP Temp Pulse Resp SpO2 Height Weight   08/06/23 1716 (!) 166/97 97  F (36.1  C) 78 16 98 % 1.727 m (5' 8\") 106.6 kg (235 lb)       Physical Exam  Vitals and nursing note reviewed.   Constitutional:       Appearance: Normal appearance. He is obese. He is not ill-appearing.   HENT:      Nose:      Comments: Not noted on the left lateral anterior nostril were bleeding was occurred from.  Bleeding was controlled.  No signs of septal hematoma.  No nasal bridge tenderness     Mouth/Throat:      Mouth: Mucous membranes are moist.      Pharynx: Oropharynx is clear.      " Comments: No posterior oropharynx drainage or bleeding noted  Eyes:      Conjunctiva/sclera: Conjunctivae normal.   Musculoskeletal:         General: Normal range of motion.   Skin:     General: Skin is warm and dry.      Capillary Refill: Capillary refill takes less than 2 seconds.      Findings: No bruising.   Neurological:      General: No focal deficit present.      Mental Status: He is alert.   Psychiatric:         Mood and Affect: Mood normal.         Behavior: Behavior normal.              LABS & RADIOLOGY:  All pertinent labs reviewed and interpreted. Reviewed all pertinent imaging. Please see official radiology report.     No orders to display             IRaghav PA-C, personally performed the services described in this documentation, and it is both accurate and complete.       Jaime Mcguire PA-C  08/06/23 2094

## 2023-08-07 ENCOUNTER — OFFICE VISIT (OUTPATIENT)
Dept: OTOLARYNGOLOGY | Facility: OTHER | Age: 73
End: 2023-08-07
Attending: NURSE PRACTITIONER
Payer: COMMERCIAL

## 2023-08-07 VITALS
BODY MASS INDEX: 35.61 KG/M2 | HEART RATE: 69 BPM | HEIGHT: 68 IN | DIASTOLIC BLOOD PRESSURE: 78 MMHG | TEMPERATURE: 97.8 F | OXYGEN SATURATION: 96 % | WEIGHT: 235 LBS | SYSTOLIC BLOOD PRESSURE: 136 MMHG

## 2023-08-07 DIAGNOSIS — R04.0 EPISTAXIS: ICD-10-CM

## 2023-08-07 DIAGNOSIS — Z98.890 H/O OF NASAL CAUTERIZATION: Primary | ICD-10-CM

## 2023-08-07 PROCEDURE — G0463 HOSPITAL OUTPT CLINIC VISIT: HCPCS | Mod: 25

## 2023-08-07 PROCEDURE — 30901 CONTROL OF NOSEBLEED: CPT | Mod: LT | Performed by: NURSE PRACTITIONER

## 2023-08-07 PROCEDURE — 99213 OFFICE O/P EST LOW 20 MIN: CPT | Mod: 25 | Performed by: NURSE PRACTITIONER

## 2023-08-07 PROCEDURE — 30901 CONTROL OF NOSEBLEED: CPT | Performed by: NURSE PRACTITIONER

## 2023-08-07 PROCEDURE — 31231 NASAL ENDOSCOPY DX: CPT | Performed by: NURSE PRACTITIONER

## 2023-08-07 RX ORDER — ECHINACEA PURPUREA EXTRACT 125 MG
2 TABLET ORAL 4 TIMES DAILY
Qty: 88 ML | Refills: 1 | Status: SHIPPED | OUTPATIENT
Start: 2023-08-07

## 2023-08-07 RX ORDER — MINERAL OIL/HYDROPHIL PETROLAT
OINTMENT (GRAM) TOPICAL 4 TIMES DAILY
Qty: 100 G | Refills: 11 | Status: SHIPPED | OUTPATIENT
Start: 2023-08-07

## 2023-08-07 RX ORDER — LIDOCAINE HYDROCHLORIDE AND EPINEPHRINE 10; 10 MG/ML; UG/ML
1 INJECTION, SOLUTION INFILTRATION; PERINEURAL ONCE
Status: COMPLETED | OUTPATIENT
Start: 2023-08-07 | End: 2023-08-07

## 2023-08-07 RX ADMIN — LIDOCAINE HYDROCHLORIDE AND EPINEPHRINE 1 ML: 10; 10 INJECTION, SOLUTION INFILTRATION; PERINEURAL at 11:54

## 2023-08-07 ASSESSMENT — PAIN SCALES - GENERAL: PAINLEVEL: NO PAIN (0)

## 2023-08-07 NOTE — LETTER
8/7/2023         RE: Umer Archuleta  [address] [facility] [address]        Dear Colleague,    Thank you for referring your patient, Umer Archuleta, to the [facility]. Please see a copy of my visit note below.      Otolaryngology Note         Chief Complaint:     Patient presents with:  Epistaxis           History of Present Illness:     Umer Archuleta presents with ongoing concerns with nosebleeds.   First nosebleed was about 3 weeks ago.    Went to the ER and had rhino rocket placed.  He removed it after 3 days.    He had some off and on oozing.  Last night he had a brisk epistaxis.  He was seen in the ED and it stopped with  minimal intervention.      Has used nothing for prevention.  Last night he was sitting on the toilet and bent over a bit and it started.      Nosebleeds are usually on the left side  No era feeling of blood running down throat.  Blood seems to be coming out the front  +post nasal drip and congestion.      Reports no nasal trauma or nasal surgery. Denies nasal injury.    No history of anticoagulant use  Takes a daily asa 81 mg  No of frequent ASA or NSAID use  History of smoking - 25 years approximately 1 ppd   + history of hypertension - he has trouble with control.  BP elevated   + tired and not much energy.   Last hgb wnl 7/19  + sleep apnea, wears mask  No history of bleeding or clotting disorders.    No concerns for easy bruising, abnormal bleeding, bloody or black stools.          Medications:     Current Outpatient Rx   Medication Sig Dispense Refill     amLODIPine (NORVASC) 5 MG tablet Take 1 tablet (5 mg) by mouth daily 90 tablet 3     aspirin EC 81 MG tablet Take 81 mg by mouth daily       Cholecalciferol (VITAMIN D3) 5000 UNIT/ML LIQD Take 1 drop by mouth       DULoxetine (CYMBALTA) 60 MG capsule TAKE 1 CAPSULE DAILY Strength: 60 mg 90 capsule 3     lithium 300 MG capsule Take 2 capsules (600 mg) by mouth daily 180 capsule 1      "losartan-hydrochlorothiazide (HYZAAR) 50-12.5 MG tablet Take 1 tablet by mouth daily 90 tablet 3     mineral oil-hydrophilic petrolatum (AQUAPHOR) external ointment Apply topically 4 times daily Apply gently to both nostrils following nasal saline use, 4 times daily and before bed. 100 g 11     omeprazole (PRILOSEC) 20 MG DR capsule TAKE 1 CAPSULE DAILY 90 capsule 3     sildenafil (VIAGRA) 100 MG tablet Take 1 tablet (100 mg) by mouth daily as needed (as needed for erectile dysfunction) 10 tablet 0     sodium chloride (OCEAN) 0.65 % nasal spray Spray 2 sprays in nostril 4 times daily 88 mL 1            Allergies:     Allergies: Patient has no known allergies.          Past Medical History:     Past Medical History:   Diagnosis Date     Bipolar I disorder, single manic episode (H) 10/23/2012     Problem list name updated by automated process. Provider to review     ED (erectile dysfunction)      Essential hypertension with goal blood pressure less than 140/90 09/15/2016     Morbid obesity due to excess calories (H) 09/11/2017     GINA (obstructive sleep apnea)     on CPAP     Recurrent major depressive disorder, in remission (H) 09/15/2016     Tobacco abuse, in remission      Umbilical hernia without obstruction and without gangrene 09/15/2016            Past Surgical History:     History reviewed. No pertinent surgical history.    ENT family history reviewed         Social History:     Social History     Tobacco Use     Smoking status: Former     Smokeless tobacco: Never   Vaping Use     Vaping Use: Never used   Substance Use Topics     Alcohol use: Yes     Comment: rarely     Drug use: Not Currently            Review of Systems:     ROS: See HPI         Physical Exam:     /78 (Cuff Size: Adult Regular)   Pulse 69   Temp 97.8  F (36.6  C) (Tympanic)   Ht 1.727 m (5' 8\")   Wt 106.6 kg (235 lb)   SpO2 96%   BMI 35.73 kg/m      General - The patient is well nourished and well developed, and appears to have " good nutritional status.  Alert and oriented to person and place, answers questions and cooperates with examination appropriately.   Head and Face - Normocephalic and atraumatic, with no gross asymmetry noted.  The facial nerve is intact, with strong symmetric movements.  Voice and Breathing - The patient was breathing comfortably without the use of accessory muscles. There was no wheezing, stridor. The patients voice was clear and strong, and had appropriate pitch and quality.  Ears - External ear normal. Canals are patent. Right tympanic membrane is intact without effusion, retraction or mass. Left tympanic membrane is intact without effusion, retraction or mass.  Eyes - Extraocular movements intact, sclera were not icteric or injected  Mouth - Examination of the oral cavity showed pink, healthy oral mucosa. Dentition in good condition. No lesions or ulcerations noted. The tongue was mobile and midline.   Throat - The walls of the oropharynx were smooth, pink, moist, symmetric, and had no lesions or ulcerations.  The tonsillar pillars and soft palate were symmetric. The uvula was midline on elevation.    Neck - Normal midline excursion of the laryngotracheal complex during swallowing.  Full range of motion on passive movement.  Palpation of the occipital, submental, submandibular, internal jugular chain, and supraclavicular nodes did not demonstrate any abnormal lymph nodes or masses.  Palpation of the thyroid was soft and smooth, with no nodules or goiter appreciated.  The trachea was mobile and midline.  Nose - External contour is symmetric, no gross deflection or scars.  Nasal mucosa is pink and moist with no abnormal mucus.        To evaluate the nose and sinuses, I performed rigid nasal endoscopy.  I sprayed both nares with 2 sprays lidocaine and neosynephrine.     I began with the RIGHT side using a 0 degree rigid nasal endoscope, and then similarly examined the LEFT side     Findings: Left caudal septum  approximately 1 cm from the vestibule has an irritated/ulcerated area with prominent vasculature.  Right caudal septum appears normal  Inferior turbinates: Moderately enlarged, left inferior turbinate medially has a similar ulcerated area.  Middle turbinate and middle meatus: Unremarkable  The superior meatus is examined and unremarkable  The remainder of the mucosa appears healthy, no polyps or purulence.    The sphenoethmoidal recess is examined and unremarkable.  Nasopharynx clear, ET patent, no edema  The patient tolerated the procedure well       Nasal Cautery - Options were explained to the patient regarding conservative measures versus nasal cautery in the clinic today.  The patient wished to proceed with cautery. I anesthetized the nose with topical lidocaine and neosynepherine, I then injected the septum with 1% lidocaine with 1 100,000 epi using approximately 2 cc..   I then applied silver nitrate to the vessels, starting distally, and working my way back to the vessels  point of entry onto the nasal mucosa.  After completion, I placed a small piece of gelfoam over the area that was cauterized.  The patient tolerated the procedure well.         Assessment and Plan:       ICD-10-CM    1. H/O of nasal cauterization  Z98.890       2. Epistaxis  R04.0 Adult ENT  Referral     lidocaine 1% with EPINEPHrine 1:100,000 injection 1 mL     sodium chloride (OCEAN) 0.65 % nasal spray     mineral oil-hydrophilic petrolatum (AQUAPHOR) external ointment          A/P  - The patient has been cauterized today for epistaxis.  I counseled them on keeping the head above the level of the heart at all times for the next week.  No picking or rubbing at the cauterized side of the nose, or blowing for 1 week.   No lifting bending or straining for 2 weeks, 10 pound weight limit.  Sneeze with mouth open.  The patient was counseled that in the event of heavy bleeding, to apply pressure to front of nose, lean forward and spit  out blood.  Apply afrin nasal spray to side of bleed until is slows or stops.  If bleeding persists or is worrisome, present to the emergency room.    To prevent epistaxis:   Use over the counter nasal saline spray (ocean nasal spray, valeriano med spray, ayr spray)  3 x daily and before bed, then apply aquaphor ointment.   Start today even if packing in place.  Complete the antibiotic if prescribed  No bending, straining or lifting over 10 pounds.  Follow up in 2 weeks for recheck.     Niyah DEMARCO  [facility]      Again, thank you for allowing me to participate in the care of your patient.        Sincerely,        Niyah Almanza, BRIDGET

## 2023-08-07 NOTE — PROGRESS NOTES
Otolaryngology Note         Chief Complaint:     Patient presents with:  Epistaxis           History of Present Illness:     Umer Archuleta presents with ongoing concerns with nosebleeds.   First nosebleed was about 3 weeks ago.    Went to the ER and had rhino rocket placed.  He removed it after 3 days.    He had some off and on oozing.  Last night he had a brisk epistaxis.  He was seen in the ED and it stopped with  minimal intervention.      Has used nothing for prevention.  Last night he was sitting on the toilet and bent over a bit and it started.      Nosebleeds are usually on the left side  No era feeling of blood running down throat.  Blood seems to be coming out the front  +post nasal drip and congestion.      Reports no nasal trauma or nasal surgery. Denies nasal injury.    No history of anticoagulant use  Takes a daily asa 81 mg  No of frequent ASA or NSAID use  History of smoking - 25 years approximately 1 ppd   + history of hypertension - he has trouble with control.  BP elevated   + tired and not much energy.   Last hgb wnl 7/19  + sleep apnea, wears mask  No history of bleeding or clotting disorders.    No concerns for easy bruising, abnormal bleeding, bloody or black stools.          Medications:     Current Outpatient Rx   Medication Sig Dispense Refill    amLODIPine (NORVASC) 5 MG tablet Take 1 tablet (5 mg) by mouth daily 90 tablet 3    aspirin EC 81 MG tablet Take 81 mg by mouth daily      Cholecalciferol (VITAMIN D3) 5000 UNIT/ML LIQD Take 1 drop by mouth      DULoxetine (CYMBALTA) 60 MG capsule TAKE 1 CAPSULE DAILY Strength: 60 mg 90 capsule 3    lithium 300 MG capsule Take 2 capsules (600 mg) by mouth daily 180 capsule 1    losartan-hydrochlorothiazide (HYZAAR) 50-12.5 MG tablet Take 1 tablet by mouth daily 90 tablet 3    mineral oil-hydrophilic petrolatum (AQUAPHOR) external ointment Apply topically 4 times daily Apply gently to both nostrils following nasal saline use, 4 times daily  "and before bed. 100 g 11    omeprazole (PRILOSEC) 20 MG DR capsule TAKE 1 CAPSULE DAILY 90 capsule 3    sildenafil (VIAGRA) 100 MG tablet Take 1 tablet (100 mg) by mouth daily as needed (as needed for erectile dysfunction) 10 tablet 0    sodium chloride (OCEAN) 0.65 % nasal spray Spray 2 sprays in nostril 4 times daily 88 mL 1            Allergies:     Allergies: Patient has no known allergies.          Past Medical History:     Past Medical History:   Diagnosis Date    Bipolar I disorder, single manic episode (H) 10/23/2012     Problem list name updated by automated process. Provider to review    ED (erectile dysfunction)     Essential hypertension with goal blood pressure less than 140/90 09/15/2016    Morbid obesity due to excess calories (H) 09/11/2017    GINA (obstructive sleep apnea)     on CPAP    Recurrent major depressive disorder, in remission (H) 09/15/2016    Tobacco abuse, in remission     Umbilical hernia without obstruction and without gangrene 09/15/2016            Past Surgical History:     History reviewed. No pertinent surgical history.    ENT family history reviewed         Social History:     Social History     Tobacco Use    Smoking status: Former    Smokeless tobacco: Never   Vaping Use    Vaping Use: Never used   Substance Use Topics    Alcohol use: Yes     Comment: rarely    Drug use: Not Currently            Review of Systems:     ROS: See HPI         Physical Exam:     /78 (Cuff Size: Adult Regular)   Pulse 69   Temp 97.8  F (36.6  C) (Tympanic)   Ht 1.727 m (5' 8\")   Wt 106.6 kg (235 lb)   SpO2 96%   BMI 35.73 kg/m      General - The patient is well nourished and well developed, and appears to have good nutritional status.  Alert and oriented to person and place, answers questions and cooperates with examination appropriately.   Head and Face - Normocephalic and atraumatic, with no gross asymmetry noted.  The facial nerve is intact, with strong symmetric movements.  Voice and " Breathing - The patient was breathing comfortably without the use of accessory muscles. There was no wheezing, stridor. The patients voice was clear and strong, and had appropriate pitch and quality.  Ears - External ear normal. Canals are patent. Right tympanic membrane is intact without effusion, retraction or mass. Left tympanic membrane is intact without effusion, retraction or mass.  Eyes - Extraocular movements intact, sclera were not icteric or injected  Mouth - Examination of the oral cavity showed pink, healthy oral mucosa. Dentition in good condition. No lesions or ulcerations noted. The tongue was mobile and midline.   Throat - The walls of the oropharynx were smooth, pink, moist, symmetric, and had no lesions or ulcerations.  The tonsillar pillars and soft palate were symmetric. The uvula was midline on elevation.    Neck - Normal midline excursion of the laryngotracheal complex during swallowing.  Full range of motion on passive movement.  Palpation of the occipital, submental, submandibular, internal jugular chain, and supraclavicular nodes did not demonstrate any abnormal lymph nodes or masses.  Palpation of the thyroid was soft and smooth, with no nodules or goiter appreciated.  The trachea was mobile and midline.  Nose - External contour is symmetric, no gross deflection or scars.  Nasal mucosa is pink and moist with no abnormal mucus.        To evaluate the nose and sinuses, I performed rigid nasal endoscopy.  I sprayed both nares with 2 sprays lidocaine and neosynephrine.     I began with the RIGHT side using a 0 degree rigid nasal endoscope, and then similarly examined the LEFT side     Findings: Left caudal septum approximately 1 cm from the vestibule has an irritated/ulcerated area with prominent vasculature.  Right caudal septum appears normal  Inferior turbinates: Moderately enlarged, left inferior turbinate medially has a similar ulcerated area.  Middle turbinate and middle meatus:  Unremarkable  The superior meatus is examined and unremarkable  The remainder of the mucosa appears healthy, no polyps or purulence.    The sphenoethmoidal recess is examined and unremarkable.  Nasopharynx clear, ET patent, no edema  The patient tolerated the procedure well       Nasal Cautery - Options were explained to the patient regarding conservative measures versus nasal cautery in the clinic today.  The patient wished to proceed with cautery. I anesthetized the nose with topical lidocaine and neosynepherine, I then injected the septum with 1% lidocaine with 1 100,000 epi using approximately 2 cc..   I then applied silver nitrate to the vessels, starting distally, and working my way back to the vessels  point of entry onto the nasal mucosa.  After completion, I placed a small piece of gelfoam over the area that was cauterized.  The patient tolerated the procedure well.         Assessment and Plan:       ICD-10-CM    1. H/O of nasal cauterization  Z98.890       2. Epistaxis  R04.0 Adult ENT  Referral     lidocaine 1% with EPINEPHrine 1:100,000 injection 1 mL     sodium chloride (OCEAN) 0.65 % nasal spray     mineral oil-hydrophilic petrolatum (AQUAPHOR) external ointment          A/P  - The patient has been cauterized today for epistaxis.  I counseled them on keeping the head above the level of the heart at all times for the next week.  No picking or rubbing at the cauterized side of the nose, or blowing for 1 week.   No lifting bending or straining for 2 weeks, 10 pound weight limit.  Sneeze with mouth open.  The patient was counseled that in the event of heavy bleeding, to apply pressure to front of nose, lean forward and spit out blood.  Apply afrin nasal spray to side of bleed until is slows or stops.  If bleeding persists or is worrisome, present to the emergency room.    To prevent epistaxis:   Use over the counter nasal saline spray (ocean nasal spray, valeriano med spray, ayr spray)  3 x daily and  before bed, then apply aquaphor ointment.   Start today even if packing in place.  Complete the antibiotic if prescribed  No bending, straining or lifting over 10 pounds.  Follow up in 2 weeks for recheck.     Niyah DEMARCO  [facility]

## 2023-08-23 DIAGNOSIS — I10 ESSENTIAL HYPERTENSION WITH GOAL BLOOD PRESSURE LESS THAN 140/90: ICD-10-CM

## 2023-08-23 DIAGNOSIS — R05.8 ACE-INHIBITOR COUGH: ICD-10-CM

## 2023-08-23 DIAGNOSIS — T46.4X5A ACE-INHIBITOR COUGH: ICD-10-CM

## 2023-08-24 RX ORDER — LOSARTAN POTASSIUM AND HYDROCHLOROTHIAZIDE 12.5; 5 MG/1; MG/1
1 TABLET ORAL DAILY
Qty: 90 TABLET | Refills: 2 | Status: SHIPPED | OUTPATIENT
Start: 2023-08-24 | End: 2024-09-12

## 2023-11-28 DIAGNOSIS — F43.21 ADJUSTMENT DISORDER WITH DEPRESSED MOOD: ICD-10-CM

## 2023-11-29 NOTE — TELEPHONE ENCOUNTER
Cymbalta               Last Written Prescription Date:  02/03/23  Last Fill Quantity: 90,   # refills: 3  Last Office Visit: 07/19/23  Future Office visit:         Omeprazole      Last Written Prescription Date:  11/02/22  Last Fill Quantity: 90,   # refills: 3  Last Office Visit: 07/19/23  Future Office visit:

## 2023-12-01 ENCOUNTER — ALLIED HEALTH/NURSE VISIT (OUTPATIENT)
Dept: FAMILY MEDICINE | Facility: OTHER | Age: 73
End: 2023-12-01
Attending: FAMILY MEDICINE
Payer: COMMERCIAL

## 2023-12-01 DIAGNOSIS — Z23 NEED FOR PROPHYLACTIC VACCINATION AND INOCULATION AGAINST INFLUENZA: Primary | ICD-10-CM

## 2023-12-01 PROCEDURE — G0008 ADMIN INFLUENZA VIRUS VAC: HCPCS

## 2023-12-04 RX ORDER — DULOXETIN HYDROCHLORIDE 60 MG/1
CAPSULE, DELAYED RELEASE ORAL
Qty: 90 CAPSULE | Refills: 3 | Status: SHIPPED | OUTPATIENT
Start: 2023-12-04 | End: 2024-04-11

## 2024-02-02 DIAGNOSIS — F31.11 BIPOLAR I DISORDER, MOST RECENT EPISODE (OR CURRENT) MANIC, MILD (H): ICD-10-CM

## 2024-02-02 RX ORDER — LITHIUM CARBONATE 300 MG/1
600 CAPSULE ORAL DAILY
Qty: 180 CAPSULE | Refills: 0 | Status: SHIPPED | OUTPATIENT
Start: 2024-02-02 | End: 2024-04-02

## 2024-02-02 NOTE — TELEPHONE ENCOUNTER
lithium 300 MG capsule     Last Written Prescription Date:  7-11-23  Last Fill Quantity: 180,   # refills: 1  Last Office Visit: 7-19-23  Future Office visit:       Routing refill request to provider for review/approval because:  Drug not on the G, P or [facility] refill protocol or controlled substance

## 2024-02-02 NOTE — TELEPHONE ENCOUNTER
Reason for call:  Medication      Have you contacted your pharmacy? Yes   If patient has contacted Pharmacy and it has been over 72hrs, continue to #2  Medication lithium 300 MG capsule   What Pharmacy do you use? [facility]       (Please note that the turn-around-time for prescriptions is 72 business hours; I am sending your request at this time. SEND TO  [facility]  )

## 2024-03-27 ENCOUNTER — TELEPHONE (OUTPATIENT)
Dept: FAMILY MEDICINE | Facility: OTHER | Age: 74
End: 2024-03-27

## 2024-03-27 DIAGNOSIS — F33.40 RECURRENT MAJOR DEPRESSIVE DISORDER, IN REMISSION (H): Primary | ICD-10-CM

## 2024-03-27 DIAGNOSIS — F43.21 ADJUSTMENT DISORDER WITH DEPRESSED MOOD: ICD-10-CM

## 2024-03-27 RX ORDER — DULOXETIN HYDROCHLORIDE 60 MG/1
60 CAPSULE, DELAYED RELEASE ORAL DAILY
Qty: 30 CAPSULE | Refills: 0 | Status: SHIPPED | OUTPATIENT
Start: 2024-03-27 | End: 2024-04-16

## 2024-03-27 NOTE — TELEPHONE ENCOUNTER
Reason for call:  Medication    Wicho called on this because he is out of medication and needs 30 day supply sent to [facility] in [place]  Have you contacted your pharmacy? No   If patient has contacted Pharmacy and it has been over 72hrs, continue to #2  Medication DULoxetine (CYMBALTA) 60 MG capsule   What Pharmacy do you use? [facility]      (Please note that the turn-around-time for prescriptions is 72 business hours; I am sending your request at this time. SEND TO  [facility]  )

## 2024-04-01 ENCOUNTER — TELEPHONE (OUTPATIENT)
Dept: FAMILY MEDICINE | Facility: OTHER | Age: 74
End: 2024-04-01

## 2024-04-01 DIAGNOSIS — F31.11 BIPOLAR I DISORDER, MOST RECENT EPISODE (OR CURRENT) MANIC, MILD (H): ICD-10-CM

## 2024-04-01 NOTE — TELEPHONE ENCOUNTER
Reason for call:  Medication      Have you contacted your pharmacy? [facility]  Medication LITHIUM  What Pharmacy do you use? [facility]    PATIENT REQUESTING A CALL TO LET HIM KNOW THIS HAS BEEN PROCESSED, PHONE [phone]     Thank you      (Please note that the turn-around-time for prescriptions is 72 business hours; I am sending your request at this time. SEND TO  [facility]  )

## 2024-04-02 NOTE — TELEPHONE ENCOUNTER
Lithium      Last Written Prescription Date:  02/02/24  Last Fill Quantity: 180,   # refills: 0  Last Office Visit: 07/19/23  Future Office visit:

## 2024-04-03 RX ORDER — LITHIUM CARBONATE 300 MG/1
600 CAPSULE ORAL DAILY
Qty: 180 CAPSULE | Refills: 3 | Status: SHIPPED | OUTPATIENT
Start: 2024-04-03

## 2024-04-10 ENCOUNTER — TELEPHONE (OUTPATIENT)
Dept: FAMILY MEDICINE | Facility: OTHER | Age: 74
End: 2024-04-10

## 2024-04-10 DIAGNOSIS — F43.21 ADJUSTMENT DISORDER WITH DEPRESSED MOOD: ICD-10-CM

## 2024-04-10 NOTE — TELEPHONE ENCOUNTER
8:29 AM    Reason for Call: Phone Call    Description: Patient is having trouble getting one of his prescriptions. Duloxetine. Pharmacy is mail order [facility]    Was an appointment offered for this call? No  If yes : Appointment type              Date    Preferred method for responding to this message: Telephone Call  What is your phone number ? [phone]    If we cannot reach you directly, may we leave a detailed response at the number you provided? Yes    Can this message wait until your PCP/provider returns, if available today? [facility] will not fill.    Ruthie Posada

## 2024-04-11 RX ORDER — DULOXETIN HYDROCHLORIDE 60 MG/1
CAPSULE, DELAYED RELEASE ORAL
Qty: 90 CAPSULE | Refills: 3 | Status: SHIPPED | OUTPATIENT
Start: 2024-04-11

## 2024-04-11 NOTE — TELEPHONE ENCOUNTER
LM for patient to return call.   Rx was signed on 12/4/23 for #90 and 3 refills.   Rx sent to [facility].pharmacy.  RN sent Rx to [facility] pharmacy.

## 2024-04-12 NOTE — TELEPHONE ENCOUNTER
Patient notified and states he will call [facility] pharmacy to make sure they received this and that it will be shipped out soon.

## 2024-04-16 DIAGNOSIS — F43.21 ADJUSTMENT DISORDER WITH DEPRESSED MOOD: ICD-10-CM

## 2024-04-16 RX ORDER — DULOXETIN HYDROCHLORIDE 60 MG/1
60 CAPSULE, DELAYED RELEASE ORAL DAILY
Qty: 30 CAPSULE | Refills: 0 | Status: SHIPPED | OUTPATIENT
Start: 2024-04-16 | End: 2024-09-12

## 2024-06-05 ENCOUNTER — TELEPHONE (OUTPATIENT)
Dept: FAMILY MEDICINE | Facility: OTHER | Age: 74
End: 2024-06-05

## 2024-06-05 NOTE — TELEPHONE ENCOUNTER
Attempt # 1  Outcome: Left Message   Comment: Left message for patient to call back to schedule annual Physical per quality measures.

## 2024-06-20 ENCOUNTER — TELEPHONE (OUTPATIENT)
Dept: ALLERGY | Facility: OTHER | Age: 74
End: 2024-06-20

## 2024-06-20 NOTE — TELEPHONE ENCOUNTER
Call to Pt.. Pt verified by name and . Pt informed we are still waiting to receive the Cologuard results. Pt stated he is still considering it at this time. Pt does not need a new kit sent. Pt will call [facility] with any questions that he has.

## 2024-06-22 ENCOUNTER — HEALTH MAINTENANCE LETTER (OUTPATIENT)
Age: 74
End: 2024-06-22

## 2024-07-09 ENCOUNTER — TELEPHONE (OUTPATIENT)
Dept: CARE COORDINATION | Facility: OTHER | Age: 74
End: 2024-07-09

## 2024-07-09 NOTE — TELEPHONE ENCOUNTER
RN CC received VM from patient requesting refills.  RN CC LM for patient to return call to clinic.  Please ask which medications patient is requesting.

## 2024-07-10 DIAGNOSIS — F43.21 ADJUSTMENT DISORDER WITH DEPRESSED MOOD: ICD-10-CM

## 2024-07-10 DIAGNOSIS — I10 ESSENTIAL HYPERTENSION WITH GOAL BLOOD PRESSURE LESS THAN 140/90: Primary | ICD-10-CM

## 2024-07-10 RX ORDER — AMLODIPINE BESYLATE 5 MG/1
5 TABLET ORAL DAILY
Qty: 90 TABLET | Refills: 1 | Status: SHIPPED | OUTPATIENT
Start: 2024-07-10 | End: 2024-09-12

## 2024-07-10 RX ORDER — LOSARTAN POTASSIUM AND HYDROCHLOROTHIAZIDE 12.5; 5 MG/1; MG/1
1 TABLET ORAL DAILY
Qty: 90 TABLET | Refills: 1 | Status: SHIPPED | OUTPATIENT
Start: 2024-07-10 | End: 2024-09-12

## 2024-07-22 DIAGNOSIS — I10 ESSENTIAL HYPERTENSION WITH GOAL BLOOD PRESSURE LESS THAN 140/90: Primary | ICD-10-CM

## 2024-07-22 DIAGNOSIS — F43.21 ADJUSTMENT DISORDER WITH DEPRESSED MOOD: ICD-10-CM

## 2024-07-22 NOTE — TELEPHONE ENCOUNTER
Omeprazole      Last Written Prescription Date:  7/10/24  Last Fill Quantity: 90,   # refills: 1  Last Office Visit: 7/19/23  Future Office visit:       Routing refill request to provider for review/approval because:      Hyzaar      Last Written Prescription Date:  7/10/24  Last Fill Quantity: 90,   # refills: 1  Last Office Visit: 7/19/23  Future Office visit:       Routing refill request to provider for review/approval because:

## 2024-07-23 NOTE — TELEPHONE ENCOUNTER
Angiotensin-II Receptors [id number]/22/2024 12:53 PM   Protocol Details Has GFR on file in past 12 months and most recent value is normal    Medication indicated for associated diagnosis    Recent (12 mo) or future (90days) visit within the authorizing provider's specialty    Normal serum potassium on file in past 12 months

## 2024-07-25 NOTE — TELEPHONE ENCOUNTER
Tried calling patient to see if he received the meds yet from [facility].  They were sent in on 7/23/24

## 2024-09-05 RX ORDER — LOSARTAN POTASSIUM AND HYDROCHLOROTHIAZIDE 12.5; 5 MG/1; MG/1
1 TABLET ORAL DAILY
Qty: 90 TABLET | Refills: 1 | OUTPATIENT
Start: 2024-09-05

## 2024-09-12 DIAGNOSIS — I10 ESSENTIAL HYPERTENSION WITH GOAL BLOOD PRESSURE LESS THAN 140/90: ICD-10-CM

## 2024-09-12 DIAGNOSIS — F43.21 ADJUSTMENT DISORDER WITH DEPRESSED MOOD: ICD-10-CM

## 2024-09-12 DIAGNOSIS — T46.4X5A ACE-INHIBITOR COUGH: ICD-10-CM

## 2024-09-12 DIAGNOSIS — R05.8 ACE-INHIBITOR COUGH: ICD-10-CM

## 2024-09-12 RX ORDER — AMLODIPINE BESYLATE 5 MG/1
5 TABLET ORAL DAILY
Qty: 90 TABLET | Refills: 3 | Status: SHIPPED | OUTPATIENT
Start: 2024-09-12

## 2024-09-12 RX ORDER — LOSARTAN POTASSIUM AND HYDROCHLOROTHIAZIDE 12.5; 5 MG/1; MG/1
1 TABLET ORAL DAILY
Qty: 90 TABLET | Refills: 3 | Status: SHIPPED | OUTPATIENT
Start: 2024-09-12

## 2024-10-03 ENCOUNTER — ORDERS ONLY (AUTO-RELEASED) (OUTPATIENT)
Dept: FAMILY MEDICINE | Facility: OTHER | Age: 74
End: 2024-10-03

## 2024-10-03 ENCOUNTER — LAB (OUTPATIENT)
Dept: LAB | Facility: OTHER | Age: 74
End: 2024-10-03
Attending: FAMILY MEDICINE
Payer: COMMERCIAL

## 2024-10-03 ENCOUNTER — OFFICE VISIT (OUTPATIENT)
Dept: FAMILY MEDICINE | Facility: OTHER | Age: 74
End: 2024-10-03
Attending: FAMILY MEDICINE
Payer: COMMERCIAL

## 2024-10-03 VITALS
SYSTOLIC BLOOD PRESSURE: 120 MMHG | OXYGEN SATURATION: 97 % | HEIGHT: 69 IN | TEMPERATURE: 97.8 F | WEIGHT: 236.8 LBS | DIASTOLIC BLOOD PRESSURE: 70 MMHG | BODY MASS INDEX: 35.07 KG/M2 | HEART RATE: 69 BPM

## 2024-10-03 DIAGNOSIS — R35.1 NOCTURIA: ICD-10-CM

## 2024-10-03 DIAGNOSIS — E66.01 MORBID OBESITY DUE TO EXCESS CALORIES (H): ICD-10-CM

## 2024-10-03 DIAGNOSIS — Z71.85 IMMUNIZATION COUNSELING: ICD-10-CM

## 2024-10-03 DIAGNOSIS — M15.0 PRIMARY OSTEOARTHRITIS INVOLVING MULTIPLE JOINTS: ICD-10-CM

## 2024-10-03 DIAGNOSIS — I10 BENIGN ESSENTIAL HYPERTENSION: ICD-10-CM

## 2024-10-03 DIAGNOSIS — F33.40 RECURRENT MAJOR DEPRESSIVE DISORDER, IN REMISSION (H): ICD-10-CM

## 2024-10-03 DIAGNOSIS — Z79.899 LONG-TERM CURRENT USE OF LITHIUM: ICD-10-CM

## 2024-10-03 DIAGNOSIS — F31.11 BIPOLAR I DISORDER, MOST RECENT EPISODE (OR CURRENT) MANIC, MILD (H): Primary | ICD-10-CM

## 2024-10-03 DIAGNOSIS — F31.11 BIPOLAR I DISORDER, MOST RECENT EPISODE (OR CURRENT) MANIC, MILD (H): ICD-10-CM

## 2024-10-03 DIAGNOSIS — Z12.11 SPECIAL SCREENING FOR MALIGNANT NEOPLASMS, COLON: ICD-10-CM

## 2024-10-03 DIAGNOSIS — G47.33 OSA ON CPAP: ICD-10-CM

## 2024-10-03 DIAGNOSIS — H90.6 MIXED CONDUCTIVE AND SENSORINEURAL HEARING LOSS OF BOTH EARS: ICD-10-CM

## 2024-10-03 LAB
ALBUMIN SERPL BCG-MCNC: 4.4 G/DL (ref 3.5–5.2)
ALP SERPL-CCNC: 68 U/L (ref 40–150)
ALT SERPL W P-5'-P-CCNC: 74 U/L (ref 0–70)
ANION GAP SERPL CALCULATED.3IONS-SCNC: 11 MMOL/L (ref 7–15)
AST SERPL W P-5'-P-CCNC: 42 U/L (ref 0–45)
BASOPHILS # BLD AUTO: 0 10E3/UL (ref 0–0.2)
BASOPHILS NFR BLD AUTO: 1 %
BILIRUB SERPL-MCNC: 0.3 MG/DL
BUN SERPL-MCNC: 15.6 MG/DL (ref 8–23)
CALCIUM SERPL-MCNC: 9 MG/DL (ref 8.8–10.4)
CHLORIDE SERPL-SCNC: 106 MMOL/L (ref 98–107)
CREAT SERPL-MCNC: 1 MG/DL (ref 0.67–1.17)
EGFRCR SERPLBLD CKD-EPI 2021: 79 ML/MIN/1.73M2
EOSINOPHIL # BLD AUTO: 0.5 10E3/UL (ref 0–0.7)
EOSINOPHIL NFR BLD AUTO: 6 %
ERYTHROCYTE [DISTWIDTH] IN BLOOD BY AUTOMATED COUNT: 14.2 % (ref 10–15)
GLUCOSE SERPL-MCNC: 114 MG/DL (ref 70–99)
HCO3 SERPL-SCNC: 25 MMOL/L (ref 22–29)
HCT VFR BLD AUTO: 41.9 % (ref 40–53)
HGB BLD-MCNC: 14 G/DL (ref 13.3–17.7)
IMM GRANULOCYTES # BLD: 0 10E3/UL
IMM GRANULOCYTES NFR BLD: 0 %
LYMPHOCYTES # BLD AUTO: 1.9 10E3/UL (ref 0.8–5.3)
LYMPHOCYTES NFR BLD AUTO: 25 %
MCH RBC QN AUTO: 30.6 PG (ref 26.5–33)
MCHC RBC AUTO-ENTMCNC: 33.4 G/DL (ref 31.5–36.5)
MCV RBC AUTO: 92 FL (ref 78–100)
MONOCYTES # BLD AUTO: 0.6 10E3/UL (ref 0–1.3)
MONOCYTES NFR BLD AUTO: 8 %
NEUTROPHILS # BLD AUTO: 4.6 10E3/UL (ref 1.6–8.3)
NEUTROPHILS NFR BLD AUTO: 60 %
NRBC # BLD AUTO: 0 10E3/UL
NRBC BLD AUTO-RTO: 0 /100
PLATELET # BLD AUTO: 278 10E3/UL (ref 150–450)
POTASSIUM SERPL-SCNC: 4.1 MMOL/L (ref 3.4–5.3)
PROT SERPL-MCNC: 7 G/DL (ref 6.4–8.3)
PSA SERPL DL<=0.01 NG/ML-MCNC: 0.65 NG/ML (ref 0–6.5)
RBC # BLD AUTO: 4.57 10E6/UL (ref 4.4–5.9)
SODIUM SERPL-SCNC: 142 MMOL/L (ref 135–145)
TSH SERPL DL<=0.005 MIU/L-ACNC: 2.37 UIU/ML (ref 0.3–4.2)
WBC # BLD AUTO: 7.6 10E3/UL (ref 4–11)

## 2024-10-03 PROCEDURE — 84443 ASSAY THYROID STIM HORMONE: CPT | Mod: ZL

## 2024-10-03 PROCEDURE — 91320 SARSCV2 VAC 30MCG TRS-SUC IM: CPT

## 2024-10-03 PROCEDURE — G2211 COMPLEX E/M VISIT ADD ON: HCPCS | Performed by: FAMILY MEDICINE

## 2024-10-03 PROCEDURE — 84153 ASSAY OF PSA TOTAL: CPT | Mod: ZL

## 2024-10-03 PROCEDURE — 99214 OFFICE O/P EST MOD 30 MIN: CPT | Performed by: FAMILY MEDICINE

## 2024-10-03 PROCEDURE — 85004 AUTOMATED DIFF WBC COUNT: CPT | Mod: ZL

## 2024-10-03 PROCEDURE — 36415 COLL VENOUS BLD VENIPUNCTURE: CPT | Mod: ZL

## 2024-10-03 PROCEDURE — 80053 COMPREHEN METABOLIC PANEL: CPT | Mod: ZL

## 2024-10-03 PROCEDURE — 90480 ADMN SARSCOV2 VAC 1/ONLY CMP: CPT

## 2024-10-03 PROCEDURE — G0463 HOSPITAL OUTPT CLINIC VISIT: HCPCS

## 2024-10-03 PROCEDURE — G0008 ADMIN INFLUENZA VIRUS VAC: HCPCS

## 2024-10-03 SDOH — HEALTH STABILITY: PHYSICAL HEALTH: ON AVERAGE, HOW MANY MINUTES DO YOU ENGAGE IN EXERCISE AT THIS LEVEL?: 20 MIN

## 2024-10-03 SDOH — HEALTH STABILITY: PHYSICAL HEALTH: ON AVERAGE, HOW MANY DAYS PER WEEK DO YOU ENGAGE IN MODERATE TO STRENUOUS EXERCISE (LIKE A BRISK WALK)?: 1 DAY

## 2024-10-03 ASSESSMENT — PATIENT HEALTH QUESTIONNAIRE - PHQ9
10. IF YOU CHECKED OFF ANY PROBLEMS, HOW DIFFICULT HAVE THESE PROBLEMS MADE IT FOR YOU TO DO YOUR WORK, TAKE CARE OF THINGS AT HOME, OR GET ALONG WITH OTHER PEOPLE: NOT DIFFICULT AT ALL
SUM OF ALL RESPONSES TO PHQ QUESTIONS 1-9: 0
SUM OF ALL RESPONSES TO PHQ QUESTIONS 1-9: 0

## 2024-10-03 ASSESSMENT — PAIN SCALES - GENERAL: PAINLEVEL: NO PAIN (0)

## 2024-10-03 ASSESSMENT — SOCIAL DETERMINANTS OF HEALTH (SDOH): HOW OFTEN DO YOU GET TOGETHER WITH FRIENDS OR RELATIVES?: MORE THAN THREE TIMES A WEEK

## 2024-10-03 NOTE — PATIENT INSTRUCTIONS
Results for orders placed or performed in visit on 10/03/24   Comprehensive metabolic panel     Status: Abnormal   Result Value Ref [facility]    Sodium 142 135 - 145 mmol/L    Potassium 4.1 3.4 - 5.3 mmol/L    Carbon Dioxide (CO2) 25 22 - 29 mmol/L    Anion Gap 11 7 - 15 mmol/L    Urea Nitrogen 15.6 8.0 - 23.0 mg/dL    Creatinine 1.00 0.67 - 1.17 mg/dL    GFR Estimate 79 >60 mL/min/1.73m2    Calcium 9.0 8.8 - 10.4 mg/dL    Chloride 106 98 - 107 mmol/L    Glucose 114 (H) 70 - 99 mg/dL    Alkaline Phosphatase 68 40 - 150 U/L    AST 42 0 - 45 U/L    ALT 74 (H) 0 - 70 U/L    Protein Total 7.0 6.4 - 8.3 g/dL    Albumin 4.4 3.5 - 5.2 g/dL    Bilirubin Total 0.3 <=1.2 mg/dL   TSH     Status: Normal   Result Value Ref [facility]    TSH 2.37 0.30 - 4.20 uIU/mL   PSA tumor marker     Status: Normal   Result Value Ref [facility]    PSA Tumor Marker 0.65 0.00 - 6.50 ng/mL    Narrative    This result is obtained using the Roche Elecsys total PSA method on the sohan e601 immunoassay analyzer, which is an ultrasensitive method. Results obtained with different assay methods or kits cannot be used interchangeably.  An undetectable (<0.01 ng/mL) ultrasensitive prostate-specific antigen (USPSA) concentration after radical prostatectomy is reassuring and may aid in postoperative risk stratification of patients.     A detectable USPSA concentration (> or =0.01 ng/mL) after radical prostatectomy (RP) does not necessarily translate into disease progression or recurrence. Interpretation of a detectable USPSA needs to be made in conjunction with other clinicopathologic risk factors. The cutpoint for interpretation of USPSA assays remains controversial and has ranged from 0.01 to 0.05 ng/mL. For example, in a study that included 754 men after RP, a cutpoint of 0.01 ng/mL was an independent predictor of biochemical recurrence (BCR). BCR-free survival at 5 years was 92.4% for patients with an USPSA post-RP of less than 0.01 ng/mL and 56.8% for patients with  an USPSA post-RP of 0.01 ng/mL or higher.(1) In the same study a cutoff of 0.03 ng/mL also predicted BCR independent of clinicopathological factors and BCR-free survival at 5 yrs was 90.8% for patients with an USPSA post-RP of less than 0.03 ng/mL and 26.9% for patients with a PSA post-RP of greater or equal to 0.03 ng/mL. (1)    1. Madeleine LJ, Patrick Z, Cristopher DW, et al. Do ultrasensitive prostate specific antigen measurements have a role in predicting long-term biochemical recurrence-free survival in men after radical prostatectomy? J Urol. 2016;195(2):330-336. doi:10.1016/j.juro.2015.08.080   CBC with platelets and differential     Status: None   Result Value Ref [facility]    WBC Count 7.6 4.0 - 11.0 10e3/uL    RBC Count 4.57 4.40 - 5.90 10e6/uL    Hemoglobin 14.0 13.3 - 17.7 g/dL    Hematocrit 41.9 40.0 - 53.0 %    MCV 92 78 - 100 fL    MCH 30.6 26.5 - 33.0 pg    MCHC 33.4 31.5 - 36.5 g/dL    RDW 14.2 10.0 - 15.0 %    Platelet Count 278 150 - 450 10e3/uL    % Neutrophils 60 %    % Lymphocytes 25 %    % Monocytes 8 %    % Eosinophils 6 %    % Basophils 1 %    % Immature Granulocytes 0 %    NRBCs per 100 WBC 0 <1 /100    Absolute Neutrophils 4.6 1.6 - 8.3 10e3/uL    Absolute Lymphocytes 1.9 0.8 - 5.3 10e3/uL    Absolute Monocytes 0.6 0.0 - 1.3 10e3/uL    Absolute Eosinophils 0.5 0.0 - 0.7 10e3/uL    Absolute Basophils 0.0 0.0 - 0.2 10e3/uL    Absolute Immature Granulocytes 0.0 <=0.4 10e3/uL    Absolute NRBCs 0.0 10e3/uL   CBC with Platelets & Differential     Status: None    Narrative    The following orders were created for panel order CBC with Platelets & Differential.  Procedure                               Abnormality         Status                     ---------                               -----------         ------                     CBC with platelets and d...[[id number]]                      Final result                 Please view results for these tests on the individual orders.

## 2024-10-03 NOTE — PROGRESS NOTES
Preventive Care Visit  [facility]  Glenn Segovia MD, Family Medicine  Oct 3, 2024      Assessment & Plan     Bipolar I disorder, most recent episode (or current) manic, mild (H)  Very stable - for years now.  Stable lithium for decades. Continue current medications and behavioral changes.   - CBC with Platelets & Differential; Future  - Comprehensive metabolic panel; Future  - TSH; Future  - PSA tumor marker; Future    Recurrent major depressive disorder, in remission (H)  Stable - good  control   - CBC with Platelets & Differential; Future  - Comprehensive metabolic panel; Future  - TSH; Future  - PSA tumor marker; Future    Long-term current use of lithium  Stable levels for years. TSH done. Check levels every other year?  - CBC with Platelets & Differential; Future  - Comprehensive metabolic panel; Future  - TSH; Future  - PSA tumor marker; Future    Benign essential hypertension  Stable - Continue current medications and behavioral changes.   - CBC with Platelets & Differential; Future  - Comprehensive metabolic panel; Future  - TSH; Future  - PSA tumor marker; Future    Morbid obesity due to excess calories (H)  Long discussion. Discussed options-- pt going to try harder     Nocturia  Psa ok /sx mild   - CBC with Platelets & Differential; Future  - Comprehensive metabolic panel; Future  - TSH; Future  - PSA tumor marker; Future    Immunization counseling  Flu and covid discussed and given     Special screening for malignant neoplasms, colon  Has last kit at home. Will give new one - needs to fill it and send it   - JEOVANY([employer]); Future    GINA on CPAP  Stabel     Mixed conductive and sensorineural hearing loss of both ears  More pronounced. Will send for evaluation and see if good candidate for hearing aids   - Adult Audiology  Referral; Future    Primary osteoarthritis involving multiple joints  Has on and off pains. Discussed wt loss/ tylenol/keep moving etc.  "            BMI  Estimated body mass index is 34.91 kg/m  as calculated from the following:    Height as of this encounter: 1.754 m (5' 9.06\").    Weight as of this encounter: 107.4 kg (236 lb 12.8 oz).   Weight management plan: Discussed healthy diet and exercise guidelines    Counseling  Appropriate preventive services were addressed with this patient via screening, questionnaire, or discussion as appropriate for fall prevention, nutrition, physical activity, Tobacco-use cessation, social engagement, weight loss and cognition.  Checklist reviewing preventive services available has been given to the patient.  Reviewed patient's diet, addressing concerns and/or questions.   He is at risk for lack of exercise and has been provided with information to increase physical activity for the benefit of his well-being.   The patient was instructed to see the dentist every 6 months.   Discussed possible causes of fatigue. The patient was provided with written information regarding signs of hearing loss.           No follow-ups on file.    Will Colon is a 74 year old, presenting for the following:  Physical        10/3/2024    10:52 AM   Additional Questions   Roomed by Eliana SOLANO   Accompanied by self         Health Care Directive  Patient does not have a Health Care Directive or Living Will: Discussed advance care planning with patient; however, patient declined at this time.      HPI  C/o increase [place]  Patients wife stated that he has been more forgetful lately, stated he can tell he has been as well. Wife also stated that he has been coughing more intense latley as well- has post nasal drip     Hypertension Follow-up    Do you check your blood pressure regularly outside of the clinic? Yes   Are you following a low salt diet? No  Are your blood pressures ever more than 140 on the top number (systolic) OR more   than 90 on the bottom number (diastolic), for example 140/90? Yes    Depression   How are you doing with " your depression since your last visit? No change  Are you having other symptoms that might be associated with depression? No  Have you had a significant life event?  No   Are you feeling anxious or having panic attacks?   No  Do you have any concerns with your use of alcohol or other drugs? No    Social History     Tobacco Use    Smoking status: Former    Smokeless tobacco: Never   Vaping Use    Vaping status: Never Used   Substance Use Topics    Alcohol use: Yes     Comment: rarely    Drug use: Not Currently         3/4/2021     9:00 AM 8/23/2022     2:00 PM 10/3/2024    10:19 AM   PHQ   PHQ-9 Total Score 0 0 0   Q9: Thoughts of better off dead/self-harm past 2 weeks Not at all Not at all Not at all         11/26/2018    10:00 AM 3/4/2021     9:00 AM 8/23/2022     2:00 PM   KACIE-7 SCORE   Total Score 1 0 0         10/3/2024    10:19 AM   Last PHQ-9   1.  Little interest or pleasure in doing things 0   2.  Feeling down, depressed, or hopeless 0   3.  Trouble falling or staying asleep, or sleeping too much 0   4.  Feeling tired or having little energy 0   5.  Poor appetite or overeating 0   6.  Feeling bad about yourself 0   7.  Trouble concentrating 0   8.  Moving slowly or restless 0   Q9: Thoughts of better off dead/self-harm past 2 weeks 0   PHQ-9 Total Score 0       Suicide Assessment Five-step Evaluation and Treatment (SAFE-T)    Concern - swelling in ankles   Onset: couple weeks ago   Description: both ankles swollen but mostly the left- little bit of pain in the left ankle. Swells up bigger when it was hurting and could hardly walk.   Intensity: moderate  Progression of Symptoms:  improving  Accompanying Signs & Symptoms: some pain   Previous history of similar problem: none   Precipitating factors:        Worsened by: none   Alleviating factors:        Improved by: ice   Therapies tried and outcome: ice   No trauma   Resolved on own         10/3/2024   General Health   How would you rate your overall physical  health? Good   Feel stress (tense, anxious, or unable to sleep) Not at all            10/3/2024   Nutrition   Diet: Regular (no restrictions)            10/3/2024   Exercise   Days per week of moderate/strenous exercise 1 day   Average minutes spent exercising at this level 20 min      (!) EXERCISE CONCERN      10/3/2024   Social Factors   Frequency of gathering with friends or relatives More than three times a week   Worry food won't last until get money to buy more No   Food not last or not have enough money for food? No   Do you have housing? (Housing is defined as stable permanent housing and does not include staying ouside in a car, in a tent, in an abandoned building, in an overnight shelter, or couch-surfing.) Yes   Are you worried about losing your housing? No   Lack of transportation? No   Unable to get utilities (heat,electricity)? No            10/3/2024   Fall Risk   Fallen 2 or more times in the past year? No    No   Trouble with walking or balance? No    No       Multiple values from one day are sorted in reverse-chronological order          10/3/2024   Activities of Daily Living- Home Safety   Needs help with the following daily activites None of the above   Safety concerns in the home None of the above            10/3/2024   Dental   Dentist two times every year? (!) NO            10/3/2024   Hearing Screening   Hearing concerns? (!) I FEEL THAT PEOPLE ARE MUMBLING OR NOT SPEAKING CLEARLY.    (!) I NEED TO ASK PEOPLE TO SPEAK UP OR REPEAT THEMSELVES.    (!) IT'S HARD TO FOLLOW A CONVERSATION IN A NOISY RESTAURANT OR CROWDED ROOM.       Multiple values from one day are sorted in reverse-chronological order         10/3/2024   Driving Risk Screening   Patient/family members have concerns about driving No            10/3/2024   General Alertness/Fatigue Screening   Have you been more tired than usual lately? (!) YES            10/3/2024   Urinary Incontinence Screening   Bothered by leaking urine in  past 6 months No            10/3/2024   TB Screening   Were you born outside of the [place]? No          Today's PHQ-9 Score:       10/3/2024    10:19 AM   PHQ-9 SCORE   PHQ-9 Total Score MyChart 0   PHQ-9 Total Score 0         10/3/2024   Substance Use   Alcohol more than 3/day or more than 7/wk No   Do you have a current opioid prescription? No   How severe/bad is pain from 1 to 10? 1/10   Do you use any other substances recreationally? No        Social History     Tobacco Use    Smoking status: Former    Smokeless tobacco: Never   Vaping Use    Vaping status: Never Used   Substance Use Topics    Alcohol use: Yes     Comment: rarely    Drug use: Not Currently           10/3/2024   AAA Screening   Family history of Abdominal Aortic Aneurysm (AAA)? No      ASCVD Risk   The 10-year ASCVD risk score (Adilson RAMIREZ, et al., 2019) is: 27.3%    Values used to calculate the score:      Age: 74 years      Sex: Male      Is Non- : No      Diabetic: No      Tobacco smoker: No      Systolic Blood Pressure: 136 mmHg      Is BP treated: Yes      HDL Cholesterol: 42 mg/dL      Total Cholesterol: 135 mg/dL            Reviewed and updated as needed this visit by Provider                    Past Medical History:   Diagnosis Date    Bipolar I disorder, single manic episode (H) 10/23/2012     Problem list name updated by automated process. Provider to review    ED (erectile dysfunction)     Essential hypertension with goal blood pressure less than 140/90 09/15/2016    Morbid obesity due to excess calories (H) 09/11/2017    GINA (obstructive sleep apnea)     on CPAP    Recurrent major depressive disorder, in remission (H) 09/15/2016    Tobacco abuse, in remission     Umbilical hernia without obstruction and without gangrene 09/15/2016     No past surgical history on file.  Current providers sharing in care for this patient include:  Patient Care Team:  Glenn Segovia MD as PCP - General  Glenn Segovia MD as  "Assigned PCP  Niyah Almanza, NP as Assigned Surgical Provider    The following health maintenance items are reviewed in Epic and correct as of today:  Health Maintenance   Topic Date Due    ZOSTER IMMUNIZATION (1 of 2) Never done    LUNG CANCER SCREENING  09/20/2017    MEDICARE ANNUAL WELLNESS VISIT  11/26/2019    DTAP/TDAP/TD IMMUNIZATION (2 - Td or Tdap) 08/14/2023    COLORECTAL CANCER SCREENING  08/29/2023    LIPID  07/19/2024    INFLUENZA VACCINE (1) 09/01/2024    COVID-19 Vaccine (6 - 2024-25 season) 09/01/2024    BMP  10/03/2025    FALL RISK ASSESSMENT  10/03/2025    ADVANCE CARE PLANNING  03/04/2026    GLUCOSE  10/03/2027    HEPATITIS C SCREENING  Completed    Pneumococcal Vaccine: 65+ Years  Completed    RSV VACCINE  Completed    AORTIC ANEURYSM SCREENING (SYSTEM ASSIGNED)  Completed    HPV IMMUNIZATION  Aged Out    MENINGITIS IMMUNIZATION  Aged Out    RSV MONOCLONAL ANTIBODY  Aged Out         Review of Systems  Constitutional, neuro, ENT, endocrine, pulmonary, cardiac, gastrointestinal, genitourinary, musculoskeletal, integument and psychiatric systems are negative, except as otherwise noted.     Objective    Exam  BP (!) 136/90 (BP Location: Right arm, Patient Position: Sitting, Cuff Size: Adult Large)   Pulse 69   Temp 97.8  F (36.6  C) (Tympanic)   Ht 1.754 m (5' 9.06\")   Wt 107.4 kg (236 lb 12.8 oz)   SpO2 97%   BMI 34.91 kg/m     Estimated body mass index is 34.91 kg/m  as calculated from the following:    Height as of this encounter: 1.754 m (5' 9.06\").    Weight as of this encounter: 107.4 kg (236 lb 12.8 oz).    Physical Exam  GENERAL: alert and no distress-- [place]  EYES: Eyes grossly normal to inspection, PERRL and conjunctivae and sclerae normal  HENT: ear canals and TM's normal, nose and mouth without ulcers or lesions  NECK: no adenopathy, no asymmetry, masses, or scars  RESP: lungs clear to auscultation - no rales, rhonchi or wheezes  CV: regular rate and rhythm, normal S1 S2, no S3 " or S4, no murmur, click or rub, no peripheral edema  ABDOMEN: soft, nontender, no hepatosplenomegaly, no masses and bowel sounds normal--- small to moderate sliding umbilical hernia   MS: no gross musculoskeletal defects noted, no edema  SKIN: no suspicious lesions or rashes  NEURO: Normal strength and tone, mentation intact and speech normal  PSYCH: mentation appears normal, affect normal/bright  LYMPH: no cervical, supraclavicular, axillary, or inguinal adenopathy         Results for orders placed or performed in visit on 10/03/24   Comprehensive metabolic panel     Status: Abnormal   Result Value Ref Range    Sodium 142 135 - 145 mmol/L    Potassium 4.1 3.4 - 5.3 mmol/L    Carbon Dioxide (CO2) 25 22 - 29 mmol/L    Anion Gap 11 7 - 15 mmol/L    Urea Nitrogen 15.6 8.0 - 23.0 mg/dL    Creatinine 1.00 0.67 - 1.17 mg/dL    GFR Estimate 79 >60 mL/min/1.73m2    Calcium 9.0 8.8 - 10.4 mg/dL    Chloride 106 98 - 107 mmol/L    Glucose 114 (H) 70 - 99 mg/dL    Alkaline Phosphatase 68 40 - 150 U/L    AST 42 0 - 45 U/L    ALT 74 (H) 0 - 70 U/L    Protein Total 7.0 6.4 - 8.3 g/dL    Albumin 4.4 3.5 - 5.2 g/dL    Bilirubin Total 0.3 <=1.2 mg/dL   TSH     Status: Normal   Result Value Ref Range    TSH 2.37 0.30 - 4.20 uIU/mL   PSA tumor marker     Status: Normal   Result Value Ref Range    PSA Tumor Marker 0.65 0.00 - 6.50 ng/mL    Narrative    This result is obtained using the Roche Elecsys total PSA method on the sohan e601 immunoassay analyzer, which is an ultrasensitive method. Results obtained with different assay methods or kits cannot be used interchangeably.  An undetectable (<0.01 ng/mL) ultrasensitive prostate-specific antigen (USPSA) concentration after radical prostatectomy is reassuring and may aid in postoperative risk stratification of patients.     A detectable USPSA concentration (> or =0.01 ng/mL) after radical prostatectomy (RP) does not necessarily translate into disease progression or recurrence.  Interpretation of a detectable USPSA needs to be made in conjunction with other clinicopathologic risk factors. The cutpoint for interpretation of USPSA assays remains controversial and has ranged from 0.01 to 0.05 ng/mL. For example, in a study that included 754 men after RP, a cutpoint of 0.01 ng/mL was an independent predictor of biochemical recurrence (BCR). BCR-free survival at 5 years was 92.4% for patients with an USPSA post-RP of less than 0.01 ng/mL and 56.8% for patients with an USPSA post-RP of 0.01 ng/mL or higher.(1) In the same study a cutoff of 0.03 ng/mL also predicted BCR independent of clinicopathological factors and BCR-free survival at 5 yrs was 90.8% for patients with an USPSA post-RP of less than 0.03 ng/mL and 26.9% for patients with a PSA post-RP of greater or equal to 0.03 ng/mL. (1)    1. Madeleine LJ, Patrick Z, Cristopher DW, et al. Do ultrasensitive prostate specific antigen measurements have a role in predicting long-term biochemical recurrence-free survival in men after radical prostatectomy? J Urol. 2016;195(2):330-336. doi:10.1016/j.juro.2015.08.080   CBC with platelets and differential     Status: None   Result Value Ref Range    WBC Count 7.6 4.0 - 11.0 10e3/uL    RBC Count 4.57 4.40 - 5.90 10e6/uL    Hemoglobin 14.0 13.3 - 17.7 g/dL    Hematocrit 41.9 40.0 - 53.0 %    MCV 92 78 - 100 fL    MCH 30.6 26.5 - 33.0 pg    MCHC 33.4 31.5 - 36.5 g/dL    RDW 14.2 10.0 - 15.0 %    Platelet Count 278 150 - 450 10e3/uL    % Neutrophils 60 %    % Lymphocytes 25 %    % Monocytes 8 %    % Eosinophils 6 %    % Basophils 1 %    % Immature Granulocytes 0 %    NRBCs per 100 WBC 0 <1 /100    Absolute Neutrophils 4.6 1.6 - 8.3 10e3/uL    Absolute Lymphocytes 1.9 0.8 - 5.3 10e3/uL    Absolute Monocytes 0.6 0.0 - 1.3 10e3/uL    Absolute Eosinophils 0.5 0.0 - 0.7 10e3/uL    Absolute Basophils 0.0 0.0 - 0.2 10e3/uL    Absolute Immature Granulocytes 0.0 <=0.4 10e3/uL    Absolute NRBCs 0.0 10e3/uL   CBC with Platelets  & Differential     Status: None    Narrative    The following orders were created for panel order CBC with Platelets & Differential.  Procedure                               Abnormality         Status                     ---------                               -----------         ------                     CBC with platelets and d...[[id number]]                      Final result                 Please view results for these tests on the individual orders.           10/3/2024   Mini Cog   Clock Draw Score 2 Normal   3 Item Recall 2 objects recalled   Mini Cog Total Score 4                 Signed Electronically by: Glenn Segovia MD    Answers submitted by the patient for this visit:  Patient Health Questionnaire (Submitted on 10/3/2024)  If you checked off any problems, how difficult have these problems made it for you to do your work, take care of things at home, or get along with other people?: Not difficult at all  PHQ9 TOTAL SCORE: 0

## 2024-10-26 ENCOUNTER — OFFICE VISIT (OUTPATIENT)
Dept: FAMILY MEDICINE | Facility: OTHER | Age: 74
End: 2024-10-26
Attending: NURSE PRACTITIONER
Payer: COMMERCIAL

## 2024-10-26 VITALS
HEIGHT: 68 IN | WEIGHT: 239.6 LBS | TEMPERATURE: 98.5 F | SYSTOLIC BLOOD PRESSURE: 122 MMHG | DIASTOLIC BLOOD PRESSURE: 80 MMHG | RESPIRATION RATE: 18 BRPM | BODY MASS INDEX: 36.31 KG/M2 | HEART RATE: 76 BPM | OXYGEN SATURATION: 98 %

## 2024-10-26 DIAGNOSIS — U07.1 COVID-19 VIRUS INFECTION: Primary | ICD-10-CM

## 2024-10-26 PROCEDURE — 99213 OFFICE O/P EST LOW 20 MIN: CPT | Performed by: NURSE PRACTITIONER

## 2024-10-26 PROCEDURE — G0463 HOSPITAL OUTPT CLINIC VISIT: HCPCS

## 2024-10-26 ASSESSMENT — PATIENT HEALTH QUESTIONNAIRE - PHQ9
SUM OF ALL RESPONSES TO PHQ QUESTIONS 1-9: 0
SUM OF ALL RESPONSES TO PHQ QUESTIONS 1-9: 0
10. IF YOU CHECKED OFF ANY PROBLEMS, HOW DIFFICULT HAVE THESE PROBLEMS MADE IT FOR YOU TO DO YOUR WORK, TAKE CARE OF THINGS AT HOME, OR GET ALONG WITH OTHER PEOPLE: NOT DIFFICULT AT ALL

## 2024-10-26 ASSESSMENT — PAIN SCALES - GENERAL: PAINLEVEL_OUTOF10: SEVERE PAIN (7)

## 2024-10-26 NOTE — PROGRESS NOTES
ASSESSMENT/PLAN:     I have reviewed the nursing notes.  I have reviewed the findings, diagnosis, plan and need for follow up with the patient.        1. COVID-19 virus infection (Primary)  - nirmatrelvir and ritonavir (PAXLOVID) 300 mg/100 mg therapy pack; Take 3 tablets by mouth 2 times daily for 5 days.  Dispense: 30 tablet; Refill: 0    Positive home covid test 10/25  Symptom onset 10/24  Patient interested in Paxlovid treatment.  States he has had this in the past with great results.  Renal function reviewed from 10/3/2024 with creatinine of 1.00 and GFR of 79  Medication interactions with Paxlovid include Sildenafil which patient will hold.    Symptomatic treatment - Encouraged fluids, salt water gargles, honey, elevation, humidifier, saline nasal spray, sinus rinse/netti pot, lozenges, tea, soup, smoothies, popsicles, topical vapor rub, rest, etc   May use over the counter cough/cold medication PRN  May use over-the-counter Tylenol or ibuprofen PRN    Discussed warning signs/symptoms indicative of need to f/u  Follow up if symptoms persist or worsen or concerns      I explained my diagnostic considerations and recommendations to the patient, who voiced understanding and agreement with the treatment plan. All questions were answered. We discussed potential side effects of any prescribed or recommended therapies, as well as expectations for response to treatments.    Gisela Way NP  [facility] AND [facility]      SUBJECTIVE:   Umre Archuleta is a 74 year old male who presents to clinic today for the following health issues:  Covid    HPI  Positive home COVID testing yesterday.  Patient reports symptom onset 2 days ago with sore throat, cough, headache, post nasal drainage, runny nose, body aches, and fatigue.  No fevers or chills.   No chest tightness, heaviness or shortness of breath.  Denies nausea, vomiting, diarrhea or stomach upset.  Appetite at baseline.  States he has been on Paxlovid in  the past with great results.   States his wife is also COVID-positive and is already on Paxlovid.        Past Medical History:   Diagnosis Date    Bipolar I disorder, single manic episode (H) 10/23/2012     Problem list name updated by automated process. Provider to review    ED (erectile dysfunction)     Essential hypertension with goal blood pressure less than 140/90 09/15/2016    Morbid obesity due to excess calories (H) 09/11/2017    GINA (obstructive sleep apnea)     on CPAP    Recurrent major depressive disorder, in remission (H) 09/15/2016    Tobacco abuse, in remission     Umbilical hernia without obstruction and without gangrene 09/15/2016     History reviewed. No pertinent surgical history.  Social History     Tobacco Use    Smoking status: Former     Passive exposure: Never    Smokeless tobacco: Never   Substance Use Topics    Alcohol use: Yes     Comment: rarely     Current Outpatient Medications   Medication Sig Dispense Refill    amLODIPine (NORVASC) 5 MG tablet Take 1 tablet (5 mg) by mouth daily. 90 tablet 3    aspirin EC 81 MG tablet Take 81 mg by mouth daily      Cholecalciferol (VITAMIN D3) 5000 UNIT/ML LIQD Take 1 drop by mouth      DULoxetine (CYMBALTA) 60 MG capsule TAKE 1 CAPSULE DAILY Strength: 60 mg 90 capsule 3    lithium 300 MG capsule Take 2 capsules (600 mg) by mouth daily 180 capsule 3    losartan-hydrochlorothiazide (HYZAAR) 50-12.5 MG tablet Take 1 tablet by mouth daily. 90 tablet 3    mineral oil-hydrophilic petrolatum (AQUAPHOR) external ointment Apply topically 4 times daily Apply gently to both nostrils following nasal saline use, 4 times daily and before bed. 100 g 11    omeprazole (PRILOSEC) 20 MG DR capsule Take 1 capsule (20 mg) by mouth daily. 90 capsule 3    sildenafil (VIAGRA) 100 MG tablet Take 1 tablet (100 mg) by mouth daily as needed (as needed for erectile dysfunction) 10 tablet 0    sodium chloride (OCEAN) 0.65 % nasal spray Spray 2 sprays in nostril 4 times daily 88  "mL 1     No Known Allergies      Past medical history, past surgical history, current medications and allergies reviewed and accurate to the best of my knowledge.        OBJECTIVE:     /80 (BP Location: Left arm, Patient Position: Chair, Cuff Size: Adult Large)   Pulse 76   Temp 98.5  F (36.9  C) (Tympanic)   Resp 18   Ht 1.727 m (5' 8\")   Wt 108.7 kg (239 lb 9.6 oz)   SpO2 98%   BMI 36.43 kg/m    Body mass index is 36.43 kg/m .        Physical Exam  General Appearance: Well appearing young elderly male, appropriate appearance for age. No acute distress  Orophayrnx: moist mucous membranes, pharynx with erythema, tonsils without hypertrophy, tonsils without erythema, no tonsillar exudates, uvula with erythema, no oral lesions, no palate petechiae, no post nasal drip seen, no trismus, voice clear.    Nose: Active clear nasal drainage  Neck: supple without adenopathy  Respiratory: normal chest wall and respirations.  Normal effort.  Clear to auscultation bilaterally, no wheezing, crackles or rhonchi.  No increased work of breathing.  No cough appreciated.  Cardiac: RRR with no murmurs  Musculoskeletal:  Equal movement of bilateral upper extremities.  Equal movement of bilateral lower extremities.  Normal gait.    Psychological: normal affect, alert, oriented, and pleasant.           "

## 2024-10-26 NOTE — NURSING NOTE
"Chief Complaint   Patient presents with    Covid Concern     Positive At Home Covid Test 10/25  Symptom Onset 10/24       Initial /80 (BP Location: Left arm, Patient Position: Chair, Cuff Size: Adult Large)   Pulse 76   Temp 98.5  F (36.9  C) (Tympanic)   Resp 18   Ht 1.727 m (5' 8\")   Wt 108.7 kg (239 lb 9.6 oz)   SpO2 98%   BMI 36.43 kg/m   Estimated body mass index is 36.43 kg/m  as calculated from the following:    Height as of this encounter: 1.727 m (5' 8\").    Weight as of this encounter: 108.7 kg (239 lb 9.6 oz).      Medication Reconciliation: Complete.       Sofía Mcfadden LPN on 10/26/2024 at 9:53 AM     " OB Progress Note:  Delivery, POD#1    S: 29yo POD#1 s/p LTCS . Her pain is well controlled. She is tolerating a regular diet and passing flatus. Denies N/V. Denies CP/SOB/lightheadedness/dizziness.   She is ambulating without difficulty vs. She has not yet been out of bed.  Indwelling catheter is in place vs. Indwelling catheter was removed this AM- patient is due to void.     O:   Vital Signs Last 24 Hrs  T(C): 36.4 (03 Dec 2017 02:08), Max: 36.9 (02 Dec 2017 15:30)  HR: 66 (03 Dec 2017 02:08) (65 - 81)  BP: 98/62 (03 Dec 2017 02:08) (96/72 - 130/84)  BP(mean): 82 (02 Dec 2017 14:30) (65 - 104)  RR: 17 (03 Dec 2017 02:08) (14 - 25)  SpO2: 99% (03 Dec 2017 02:08) (96% - 100%)    Labs:  Blood type: A Positive  Rubella IgG: RPR: Negative                      11.4<L>   14.29<H> >-----------< 186    (  @ 02:00 )             32.1<L>                        12.9   7.71 >-----------< 203    (  @ 04:20 )             38.5      PE:  General: NAD  Abdomen: nondistended, appropriately tender, incision c/d/i.  Extremities: No erythema, no pitting edema OB Progress Note:  Delivery, POD#1    S: 27yo POD#1 s/p LTCS. Her pain is well controlled. She has not yet been out of bed.  Indwelling catheter was removed this AM- patient is due to void.     O:   Vital Signs Last 24 Hrs  T(C): 36.4 (03 Dec 2017 02:08), Max: 36.9 (02 Dec 2017 15:30)  HR: 66 (03 Dec 2017 02:08) (65 - 81)  BP: 98/62 (03 Dec 2017 02:08) (96/72 - 130/84)  BP(mean): 82 (02 Dec 2017 14:30) (65 - 104)  RR: 17 (03 Dec 2017 02:08) (14 - 25)  SpO2: 99% (03 Dec 2017 02:08) (96% - 100%)    Labs:  Blood type: A Positive  Rubella IgG: RPR: Negative                      11.4<L>   14.29<H> >-----------< 186    (  @ 02:00 )             32.1<L>                        12.9   7.71 >-----------< 203    (  @ 04:20 )             38.5      PE:  General: NAD  Abdomen: nondistended, appropriately tender, incision c/d/i.  Extremities: No erythema, no pitting edema

## 2024-10-28 ENCOUNTER — TELEPHONE (OUTPATIENT)
Dept: FAMILY MEDICINE | Facility: OTHER | Age: 74
End: 2024-10-28

## 2024-10-28 NOTE — TELEPHONE ENCOUNTER
"Call placed to patient to discuss positive covid 19 results.   Underlying Medical Conditions: Age-74, Morbid Obesity, Depressive Disorder, Bipolar, GINA on CPAP  Patient testing positive on 10/25/24   Test by:  home test  Start of Symptoms: 10/25/24    Chart reviewed, patient had appointment with Jaycee Way NP at [facility] and was prescribed Paxlovid treatment    Instructions for Patients  Your COVID-19 test was positive. This means you have the virus. Please follow the \"How can I take care of myself\" and \"How do I self-isolate?\" guidelines in these instructions.    What treatments are available?  Over-the-counter medicines may help with your symptoms such as runny or stuffy nose, cough, chills, and fever. Talk to your care team about your options.     Some people are at high risk for severe illness (for example if you have a weak immune system, you're 65 or older, or you have certain medical problems). If your risk it high and your symptoms started in the last 5 to 7 days, we strongly recommend for you to get COVID treatment as soon as possible before you get really sick. Paxlovid, Molnupiravir and the monoclonal antibody treatments are proven safe and effective, make you feel better faster, and prevent hospitalization and death.       What are the symptoms of COVID-19?  Symptoms can include fever, cough, shortness of breath, chills, headache, muscle pain sore throat, fatigue, runny or stuffy nose, and loss of taste and smell. Other less common symptoms include nausea, vomiting, or diarrhea (watery stools).    Know when to call 911. Emergency warning signs include:  Trouble breathing or shortness of breath  Pain or pressure in the chest that doesn't go away  Feeling confused like you haven't felt before, or not being able to wake up  Bluish-colored lips or face    How can I take care of myself?  Get lots of rest. Drink extra fluids (unless a doctor has told you not to).  Take Tylenol (acetaminophen) for fever or " pain. If you have liver or kidney problems, ask your family doctor if it's okay to take Tylenol   Adults:   650 mg (two 325 mg pills or tablets) every 4 to 6 hours, or...   1,000 mg (two 500 mg pills or tablets) every 8 hours as needed.  Note: Don't take more than 3,000 mg in one day. Acetaminophen is found in many medicines (both prescribed and over the counter). Read all labels to be sure you don't take too much.  For children, check the Tylenol bottle for the right dose. The dose is based on the child's age or weight.  Take over the counter medicines for your symptoms as needed. Talk to your pharmacist.  If you have other health problems (like cancer, heart failure, an organ transplant, or severe kidney disease): Call your specialty clinic if you don't feel better in the next 2 days.    These guidelines are for isolating and quarantining before returning to work, school or .   For employers, schools and day cares: This is an official notice for this person's medical guidelines for returning in-person.   For health care sites: The CDC gives different isolation and quarantine guidelines for healthcare sites, please check with these sites before arriving.     How do I self-isolate?  You isolate when you have symptoms of COVID or a test shows you have COVID, even if you don't have symptoms.   If you DO have symptoms:  Stay home and away from others  For at least 5 days after your symptoms started, AND   You are fever free for 24 hours (with no medicine that reduces fever), AND  Your other symptoms are better.  Wear a mask for 10 full days any time you are around others.  If you DON'T have symptoms:  Stay at home and away from others for at least 5 days after your positive test.  Wear a mask for 10 full days any time you are around others.    How and when do I quarantine?  You quarantine when you may have been exposed to the virus and DON'T have symptoms.   Stay home and away from others.   You must quarantine  for 5 days after your last contact with a person who has COVID.  Note: If you are fully vaccinated, you don't need to quarantine. You should still follow the steps below.   Wear a mask for 10 full days any time you're around others.  Get tested at least 5 days after you were exposed, even if you don't have symptoms.   If you start to have symptoms, isolate right away and get tested.    Where can I get more information?  [facility] COVID-19 Resource Hub: [email or web address]   [facility][note 3] Quarantine & Isolation: https://www.cdc.gov/coronavirus/2019-ncov/your-health/quarantine-isolation.html   CDC - What to Do If You're Sick: https://www.cdc.gov/coronavirus/2019-ncov/if-you-are-sick/index.html  [facility] clinical trials (COVID-19 research studies): [email or web address]-trials  [place] Department of Health COVID-19 Public Hotline: 1-698.614.1338

## 2024-10-30 DIAGNOSIS — R19.5 POSITIVE COLORECTAL CANCER SCREENING USING COLOGUARD TEST: Primary | ICD-10-CM

## 2024-10-30 LAB — NONINV COLON CA DNA+OCC BLD SCRN STL QL: POSITIVE

## 2024-10-31 ENCOUNTER — TELEPHONE (OUTPATIENT)
Dept: FAMILY MEDICINE | Facility: OTHER | Age: 74
End: 2024-10-31

## 2024-10-31 NOTE — TELEPHONE ENCOUNTER
Screening Questions for the Scheduling of Screening Colonoscopies     (If Colonoscopy is diagnostic, Provider should review the chart before scheduling.)    Are you younger than 50 or older than 80?  No    Do you take aspirin or fish oil?  Yes:  (if yes, tell patient to stop 1 week prior to Colonoscopy)    Do you take warfarin (Coumadin), clopidogrel (Plavix), apixaban (Eliquis), dabigatram (Pradaxa), rivaroxaban (Xarelto) or any blood thinner? No    Do you use oxygen at home?  No    Do you have kidney disease? No    Are you on dialysis? No    Have you had a stroke or heart attack in the last year? No    Have you had a stent in your heart or any blood vessel in the last year? No    Have you had a transplant of any organ?  No    Have you had a colonoscopy or upper endoscopy (EGD) before?  YES. Date-2013.         Date of scheduled Colonoscopy: 12/23/24    Provider: Stasky    Pharmacy [facility]

## 2024-12-26 ENCOUNTER — OFFICE VISIT (OUTPATIENT)
Dept: AUDIOLOGY | Facility: OTHER | Age: 74
End: 2024-12-26
Attending: FAMILY MEDICINE
Payer: COMMERCIAL

## 2024-12-26 DIAGNOSIS — H90.3 SENSORINEURAL HEARING LOSS, ASYMMETRICAL: Primary | ICD-10-CM

## 2024-12-26 DIAGNOSIS — H90.6 MIXED CONDUCTIVE AND SENSORINEURAL HEARING LOSS OF BOTH EARS: ICD-10-CM

## 2024-12-26 PROCEDURE — 92557 COMPREHENSIVE HEARING TEST: CPT | Performed by: AUDIOLOGIST

## 2024-12-26 PROCEDURE — 92550 TYMPANOMETRY & REFLEX THRESH: CPT | Performed by: AUDIOLOGIST

## 2024-12-26 NOTE — PROGRESS NOTES
Audiology Evaluation Completed. Please refer SCANNED AUDIOGRAM and/or TYMPANOGRAM for BACKGROUND, RESULTS, RECOMMENDATIONS.      Gabi FERRO, [facility]  Audiologist #[id number]

## 2024-12-30 ENCOUNTER — TELEPHONE (OUTPATIENT)
Dept: OTOLARYNGOLOGY | Facility: OTHER | Age: 74
End: 2024-12-30

## 2025-02-03 ENCOUNTER — OFFICE VISIT (OUTPATIENT)
Dept: OTOLARYNGOLOGY | Facility: OTHER | Age: 75
End: 2025-02-03
Attending: NURSE PRACTITIONER
Payer: COMMERCIAL

## 2025-02-03 VITALS
HEART RATE: 81 BPM | HEIGHT: 68 IN | SYSTOLIC BLOOD PRESSURE: 128 MMHG | TEMPERATURE: 97.1 F | BODY MASS INDEX: 36.22 KG/M2 | DIASTOLIC BLOOD PRESSURE: 84 MMHG | OXYGEN SATURATION: 98 % | WEIGHT: 239 LBS

## 2025-02-03 DIAGNOSIS — R05.1 ACUTE COUGH: ICD-10-CM

## 2025-02-03 DIAGNOSIS — H90.3 SENSORINEURAL HEARING LOSS, ASYMMETRICAL: Primary | ICD-10-CM

## 2025-02-03 DIAGNOSIS — R09.82 POST-NASAL DRIP: ICD-10-CM

## 2025-02-03 LAB
FLUAV RNA SPEC QL NAA+PROBE: NEGATIVE
FLUBV RNA RESP QL NAA+PROBE: NEGATIVE
RSV RNA SPEC NAA+PROBE: NEGATIVE
SARS-COV-2 RNA RESP QL NAA+PROBE: NEGATIVE

## 2025-02-03 PROCEDURE — 92504 EAR MICROSCOPY EXAMINATION: CPT | Performed by: PHYSICIAN ASSISTANT

## 2025-02-03 PROCEDURE — 87637 SARSCOV2&INF A&B&RSV AMP PRB: CPT | Mod: ZL | Performed by: PHYSICIAN ASSISTANT

## 2025-02-03 PROCEDURE — G0463 HOSPITAL OUTPT CLINIC VISIT: HCPCS

## 2025-02-03 RX ORDER — IPRATROPIUM BROMIDE 21 UG/1
2 SPRAY, METERED NASAL 3 TIMES DAILY PRN
Qty: 30 ML | Refills: 1 | Status: SHIPPED | OUTPATIENT
Start: 2025-02-03

## 2025-02-03 ASSESSMENT — PAIN SCALES - GENERAL: PAINLEVEL_OUTOF10: NO PAIN (0)

## 2025-02-03 NOTE — PROGRESS NOTES
"Problem: Patient Care Overview  Goal: Plan of Care Review  Outcome: Ongoing (interventions implemented as appropriate)  Mom will continue to pump/hand express at least 8+ times/24 hrs for  nicu baby. Symphony pump at . Reviewed use/cleaning. Stressed importance of hand hygiene & keeping pump kit clean. Will collect, label, store & transport EBM as instructed. Spoke to "Sly" at [facility] office in [place]. Has hospital grade pump available. Faxes requested paperwork to [facility]. Informed mom that there is a pump available & she will have her mother pick it up before 3pm today. Questions answered. Verbalized understanding. Will call for any needs.          " Otolaryngology Consultation    Patient: Umer Archuleta  : 1950    Patient presents with:  Hearing Problem: Glenn COLLINS MD referring      HPI:  Umer Archuleta is a 74 year old male seen today for hearing loss. Patient presents for concerns of hearing.   Isaiah presents for concerns of hearing loss. Gradual hearing loss over the last several years. Hx of noise exposure while working, but did use HP. He does have a harder time hearing at home and unable to understand words.     Denies COM or otologic surgeries.   Denies otalgia, otorrhea  Denies worrisome tinnitus  Denies fluctuating hearing loss or tinnitus.   Denies vertigo or facial paraesthesia.   No significant otologic family hx.     Audiogram 24  Type A tympanograms  Thresholds are Normal range-slight loss sloping to severe left and moderate severe right asymmetrical sensorineural hearing loss.  SRT=PTA  WRS-  Right- 100%@60dB  Left- 100% @60 dB    He had post nasal drip for years, but has felt some coughing related to the cough. He does feel the cough seems different. Denies fevers, chills, body aches. His cough has felt to be more of a harsh/ coarse cough.   He has felt increase in cough mild chest discomfort. Denies chest pain today.   He has felt the discomfort related to coughing.   Denies dyspnea.No change in appetite.     Current Outpatient Rx   Medication Sig Dispense Refill    amLODIPine (NORVASC) 5 MG tablet Take 1 tablet (5 mg) by mouth daily. 90 tablet 3    aspirin EC 81 MG tablet Take 81 mg by mouth daily      Cholecalciferol (VITAMIN D3) 5000 UNIT/ML LIQD Take 1 drop by mouth      DULoxetine (CYMBALTA) 60 MG capsule TAKE 1 CAPSULE DAILY Strength: 60 mg 90 capsule 3    lithium 300 MG capsule Take 2 capsules (600 mg) by mouth daily 180 capsule 3    losartan-hydrochlorothiazide (HYZAAR) 50-12.5 MG tablet Take 1 tablet by mouth daily. 90 tablet 3    mineral oil-hydrophilic petrolatum (AQUAPHOR) external ointment Apply  "topically 4 times daily Apply gently to both nostrils following nasal saline use, 4 times daily and before bed. 100 g 11    omeprazole (PRILOSEC) 20 MG DR capsule Take 1 capsule (20 mg) by mouth daily. 90 capsule 3    polyethylene glycol (GAVILYTE-G) 236 g suspension Take 4,000 mLs by mouth once as directed (Follow directions on the golytely hand out that was mailed to you.). 4000 mL 0    sildenafil (VIAGRA) 100 MG tablet Take 1 tablet (100 mg) by mouth daily as needed (as needed for erectile dysfunction) 10 tablet 0    sodium chloride (OCEAN) 0.65 % nasal spray Spray 2 sprays in nostril 4 times daily 88 mL 1       Allergies: Patient has no known allergies.     Past Medical History:   Diagnosis Date    Bipolar I disorder, single manic episode (H) 10/23/2012     Problem list name updated by automated process. Provider to review    ED (erectile dysfunction)     Essential hypertension with goal blood pressure less than 140/90 09/15/2016    Morbid obesity due to excess calories (H) 09/11/2017    GINA (obstructive sleep apnea)     on CPAP    Recurrent major depressive disorder, in remission 09/15/2016    Tobacco abuse, in remission     Umbilical hernia without obstruction and without gangrene 09/15/2016       No past surgical history on file.    ENT family history reviewed    Social History     Tobacco Use    Smoking status: Former     Passive exposure: Never    Smokeless tobacco: Never   Vaping Use    Vaping status: Never Used   Substance Use Topics    Alcohol use: Yes     Comment: rarely    Drug use: Not Currently       Review of Systems  ROS: 10 point ROS neg other than the symptoms noted above in the HPI     Physical Exam  /84 (BP Location: Right arm, Patient Position: Sitting, Cuff Size: Adult Large)   Pulse 81   Temp 97.1  F (36.2  C) (Tympanic)   Ht 1.727 m (5' 8\")   Wt 108.4 kg (239 lb)   SpO2 98%   BMI 36.34 kg/m      General - The patient is well nourished and well developed, and appears to have good " nutritional status.  Alert and oriented to person and place, answers questions and cooperates with examination appropriately.   Head and Face - Normocephalic and atraumatic, with no gross asymmetry noted.  The facial nerve is intact, with strong symmetric movements.  Voice and Breathing - The patient was breathing comfortably without the use of accessory muscles. There was no wheezing, stridor, or stertor.  The patients voice was clear and strong, and had appropriate pitch and quality.  Ears -ears exam with otoscope and under otologic microscopy.  The external auditory canals are patent, the tympanic membranes are intact without effusion, retraction or mass.  Bony landmarks are intact.  Eyes - Extraocular movements intact, and the pupils were reactive to light.  Sclera were not icteric or injected, conjunctiva were pink and moist.  Mouth - Examination of the oral cavity showed pink, healthy oral mucosa. No lesions or ulcerations noted.  The tongue was mobile and midline, and the dentition were in good condition.    Throat - The walls of the oropharynx were smooth, pink, moist, symmetric, and had no lesions or ulcerations.  The tonsillar pillars and soft palate were symmetric.  The uvula was midline on elevation.    Neck - Normal midline excursion of the laryngotracheal complex during swallowing.  Full range of motion on passive movement.  Palpation of the occipital, submental, submandibular, internal jugular chain, and supraclavicular nodes did not demonstrate any abnormal lymph nodes or masses.  Palpation of the thyroid was soft and smooth, with no nodules or goiter appreciated.  The trachea was mobile and midline.  Nose - External contour is symmetric, no gross deflection or scars.  Nasal mucosa is pink and moist with no abnormal mucus.    Heart- Regular rate  Lungs- Clear anterior and posterior., no wheeze.     Impression and Plan- Umer CLARISA Geremias is a 74 year old male with:    ICD-10-CM    1. Sensorineural  hearing loss, asymmetrical  H90.3 Adult ENT  Referral     MR Internal Auditory Canal wo&w Contrast     Adult Audiology  Referral      2. Post-nasal drip  R09.82 Influenza A/B, RSV and SARS-CoV2 PCR (COVID-19) Nasopharyngeal     Influenza A/B, RSV and SARS-CoV2 PCR (COVID-19) Nasopharyngeal     ipratropium (ATROVENT) 0.03 % nasal spray     DISCONTINUED: lidocaine 2%-oxymetazoline 0.025% nasal solution 2 spray      3. Acute cough  R05.1 Influenza A/B, RSV and SARS-CoV2 PCR (COVID-19) Nasopharyngeal     Influenza A/B, RSV and SARS-CoV2 PCR (COVID-19) Nasopharyngeal            Complete MRI of Inner ear.   Hearing protection  Complete hearing aid consult  Annual audiogram.       Patient reported recent coughing, URI. Lungs clear.   Screening negative for COVID, Influenza.   He will continue with supportive cares at home    He does have chronic PND, rhinitis. Complete a trial of Atrovent  Follow up in 1 month, consider nasal / NP exam.     Hearing preservation reinforced     Hearing loss is an independent risk factor for dementia and cognitive decline.  Hearing loss also leads to higher risk of falls  Tran et all, Lancet. 2020     Importance of hearing aids reinforced'      The possible etiologies for this were discussed. They include medication, infection, trauma or neoplasm.  Infrequently a sensorineural hearing loss will occur during or soon after a viral upper respiratory infection. I  discussed the indication for MRI of the Brain and Internal Auditory Canals.  The possibility of acoustic neuroma causing asymmetrical nerve hearing loss was discussed.  The patient was told acoustic neuromas are very rare, benign, and generally treated by observation only.              Minda Ugarte PA-C  ENT  [facility], [place]

## 2025-02-03 NOTE — LETTER
2/3/2025      Umer Archuleta  [address] [facility] [address]      Dear Colleague,    Thank you for referring your patient, Umer Archuleta, to the [facility]. Please see a copy of my visit note below.    Otolaryngology Consultation    Patient: Umer Archuleta  : 1950    Patient presents with:  Hearing Problem: Glenn COLLINS MD referring      HPI:  Umer Archuleta is a 74 year old male seen today for hearing loss. Patient presents for concerns of hearing.   Isaiah presents for concerns of hearing loss. Gradual hearing loss over the last several years. Hx of noise exposure while working, but did use HP. He does have a harder time hearing at home and unable to understand words.     Denies COM or otologic surgeries.   Denies otalgia, otorrhea  Denies worrisome tinnitus  Denies fluctuating hearing loss or tinnitus.   Denies vertigo or facial paraesthesia.   No significant otologic family hx.     Audiogram 24  Type A tympanograms  Thresholds are Normal range-slight loss sloping to severe left and moderate severe right asymmetrical sensorineural hearing loss.  SRT=PTA  WRS-  Right- 100%@60dB  Left- 100% @60 dB    He had post nasal drip for years, but has felt some coughing related to the cough. He does feel the cough seems different. Denies fevers, chills, body aches. His cough has felt to be more of a harsh/ coarse cough.   He has felt increase in cough mild chest discomfort. Denies chest pain today.   He has felt the discomfort related to coughing.   Denies dyspnea.No change in appetite.     Current Outpatient Rx   Medication Sig Dispense Refill     amLODIPine (NORVASC) 5 MG tablet Take 1 tablet (5 mg) by mouth daily. 90 tablet 3     aspirin EC 81 MG tablet Take 81 mg by mouth daily       Cholecalciferol (VITAMIN D3) 5000 UNIT/ML LIQD Take 1 drop by mouth       DULoxetine (CYMBALTA) 60 MG capsule TAKE 1 CAPSULE DAILY Strength: 60 mg 90 capsule 3     lithium 300  MG capsule Take 2 capsules (600 mg) by mouth daily 180 capsule 3     losartan-hydrochlorothiazide (HYZAAR) 50-12.5 MG tablet Take 1 tablet by mouth daily. 90 tablet 3     mineral oil-hydrophilic petrolatum (AQUAPHOR) external ointment Apply topically 4 times daily Apply gently to both nostrils following nasal saline use, 4 times daily and before bed. 100 g 11     omeprazole (PRILOSEC) 20 MG DR capsule Take 1 capsule (20 mg) by mouth daily. 90 capsule 3     polyethylene glycol (GAVILYTE-G) 236 g suspension Take 4,000 mLs by mouth once as directed (Follow directions on the golytely hand out that was mailed to you.). 4000 mL 0     sildenafil (VIAGRA) 100 MG tablet Take 1 tablet (100 mg) by mouth daily as needed (as needed for erectile dysfunction) 10 tablet 0     sodium chloride (OCEAN) 0.65 % nasal spray Spray 2 sprays in nostril 4 times daily 88 mL 1       Allergies: Patient has no known allergies.     Past Medical History:   Diagnosis Date     Bipolar I disorder, single manic episode (H) 10/23/2012     Problem list name updated by automated process. Provider to review     ED (erectile dysfunction)      Essential hypertension with goal blood pressure less than 140/90 09/15/2016     Morbid obesity due to excess calories (H) 09/11/2017     GINA (obstructive sleep apnea)     on CPAP     Recurrent major depressive disorder, in remission 09/15/2016     Tobacco abuse, in remission      Umbilical hernia without obstruction and without gangrene 09/15/2016       No past surgical history on file.    ENT family history reviewed    Social History     Tobacco Use     Smoking status: Former     Passive exposure: Never     Smokeless tobacco: Never   Vaping Use     Vaping status: Never Used   Substance Use Topics     Alcohol use: Yes     Comment: rarely     Drug use: Not Currently       Review of Systems  ROS: 10 point ROS neg other than the symptoms noted above in the HPI     Physical Exam  /84 (BP Location: Right arm, Patient  "Position: Sitting, Cuff Size: Adult Large)   Pulse 81   Temp 97.1  F (36.2  C) (Tympanic)   Ht 1.727 m (5' 8\")   Wt 108.4 kg (239 lb)   SpO2 98%   BMI 36.34 kg/m      General - The patient is well nourished and well developed, and appears to have good nutritional status.  Alert and oriented to person and place, answers questions and cooperates with examination appropriately.   Head and Face - Normocephalic and atraumatic, with no gross asymmetry noted.  The facial nerve is intact, with strong symmetric movements.  Voice and Breathing - The patient was breathing comfortably without the use of accessory muscles. There was no wheezing, stridor, or stertor.  The patients voice was clear and strong, and had appropriate pitch and quality.  Ears -ears exam with otoscope and under otologic microscopy.  The external auditory canals are patent, the tympanic membranes are intact without effusion, retraction or mass.  Bony landmarks are intact.  Eyes - Extraocular movements intact, and the pupils were reactive to light.  Sclera were not icteric or injected, conjunctiva were pink and moist.  Mouth - Examination of the oral cavity showed pink, healthy oral mucosa. No lesions or ulcerations noted.  The tongue was mobile and midline, and the dentition were in good condition.    Throat - The walls of the oropharynx were smooth, pink, moist, symmetric, and had no lesions or ulcerations.  The tonsillar pillars and soft palate were symmetric.  The uvula was midline on elevation.    Neck - Normal midline excursion of the laryngotracheal complex during swallowing.  Full range of motion on passive movement.  Palpation of the occipital, submental, submandibular, internal jugular chain, and supraclavicular nodes did not demonstrate any abnormal lymph nodes or masses.  Palpation of the thyroid was soft and smooth, with no nodules or goiter appreciated.  The trachea was mobile and midline.  Nose - External contour is symmetric, no gross " deflection or scars.  Nasal mucosa is pink and moist with no abnormal mucus.    Heart- Regular rate  Lungs- Clear anterior and posterior., no wheeze.     Impression and Plan- Umer Archuleta is a 74 year old male with:    ICD-10-CM    1. Sensorineural hearing loss, asymmetrical  H90.3 Adult ENT  Referral     MR Internal Auditory Canal wo&w Contrast     Adult Audiology  Referral      2. Post-nasal drip  R09.82 Influenza A/B, RSV and SARS-CoV2 PCR (COVID-19) Nasopharyngeal     Influenza A/B, RSV and SARS-CoV2 PCR (COVID-19) Nasopharyngeal     ipratropium (ATROVENT) 0.03 % nasal spray     DISCONTINUED: lidocaine 2%-oxymetazoline 0.025% nasal solution 2 spray      3. Acute cough  R05.1 Influenza A/B, RSV and SARS-CoV2 PCR (COVID-19) Nasopharyngeal     Influenza A/B, RSV and SARS-CoV2 PCR (COVID-19) Nasopharyngeal            Complete MRI of Inner ear.   Hearing protection  Complete hearing aid consult  Annual audiogram.       Patient reported recent coughing, URI. Lungs clear.   Screening negative for COVID, Influenza.   He will continue with supportive cares at home    He does have chronic PND, rhinitis. Complete a trial of Atrovent  Follow up in 1 month, consider nasal / NP exam.     Hearing preservation reinforced     Hearing loss is an independent risk factor for dementia and cognitive decline.  Hearing loss also leads to higher risk of falls  Marc et all, Lancet. 2020     Importance of hearing aids reinforced'      The possible etiologies for this were discussed. They include medication, infection, trauma or neoplasm.  Infrequently a sensorineural hearing loss will occur during or soon after a viral upper respiratory infection. I  discussed the indication for MRI of the Brain and Internal Auditory Canals.  The possibility of acoustic neuroma causing asymmetrical nerve hearing loss was discussed.  The patient was told acoustic neuromas are very rare, benign, and generally treated by  observation only.              Minda Ugarte PA-C  ENT  [facility], [place]      Again, thank you for allowing me to participate in the care of your patient.        Sincerely,        Minda Ugarte PA-C    Electronically signed

## 2025-02-03 NOTE — PATIENT INSTRUCTIONS
Complete MRI of Inner ear.   Hearing protection  Complete hearing aid consult  Annual audiogram.     Screening completed today   Return in 1 month for nasal symptoms/ post nasal drainage      Thank you for allowing Minda Ugarte PA-C and our ENT team to participate in your care.  If your medications are too expensive, please give the nurse a call.  We can possibly change this medication.  If you have a scheduling or an appointment question please contact our Health Unit Coordinator at [phone], Ext. [phone].    ALL nursing questions or concerns can be directed to your ENT nurse at: [phone] Jamia      Hearing preservation reinforced     Hearing loss is an independent risk factor for dementia and cognitive decline.  Hearing loss also leads to higher risk of falls  Tran et all, Lancet. 2020     Importance of hearing aids reinforced'

## 2025-02-06 NOTE — OR NURSING
Pt does not have preop scheduled for procedure on 2/17/25.  Message sent to [facility] group at clinic.

## 2025-02-09 ENCOUNTER — ANESTHESIA EVENT (OUTPATIENT)
Dept: SURGERY | Facility: HOSPITAL | Age: 75
End: 2025-02-09
Payer: COMMERCIAL

## 2025-02-09 NOTE — ANESTHESIA PREPROCEDURE EVALUATION
Anesthesia Pre-Procedure Evaluation    Patient: Umer Archuleta   MRN: [id number] : 1950        Procedure : Procedure(s):  COLONOSCOPY          Past Medical History:   Diagnosis Date     Bipolar I disorder, single manic episode (H) 10/23/2012     Problem list name updated by automated process. Provider to review     ED (erectile dysfunction)      Essential hypertension with goal blood pressure less than 140/90 09/15/2016     Morbid obesity due to excess calories (H) 2017     GINA (obstructive sleep apnea)     on CPAP     Recurrent major depressive disorder, in remission 09/15/2016     Tobacco abuse, in remission      Umbilical hernia without obstruction and without gangrene 09/15/2016      No past surgical history on file.   No Known Allergies   Social History     Tobacco Use     Smoking status: Former     Passive exposure: Never     Smokeless tobacco: Never   Substance Use Topics     Alcohol use: Yes     Comment: rarely      Wt Readings from Last 1 Encounters:   25 108.4 kg (239 lb)        Anesthesia Evaluation   Pt has had prior anesthetic. Type: MAC.        ROS/MED HX  ENT/Pulmonary: Comment: Chronic rhinitis/PND--cough post nasal gtt   Hearing loss - wears hearing aides    (+) sleep apnea, uses CPAP,              tobacco use (quit ), Past use,                       Neurologic:       Cardiovascular:     (+)  hypertension-range: amlodipine and hyzaar/ -   -  - -   Taking blood thinners  Instructions Given to patient: asa.                            Previous cardiac testing   Echo: Date: Results:    Stress Test:  Date:  Results:  1. No evidence of myocardial ischemia.  2. Normal left ventricular function.    ECG Reviewed:  Date:  Results:  Sinus rhythm   Right bundle branch block   Possible Inferior infarct , age undetermined   Abnormal ECG   When compared with ECG of 2025 13:10,   QRS duration has decreased   Nonspecific anterior T-wave abnormality has improved.  "  Confirmed by MD ASHLEY, [facility] ([id number]) on 4/10/2025 4:10:55 PM     Cath:  Date: Results:      METS/Exercise Tolerance: >4 METS    Hematologic:  - neg hematologic  ROS     Musculoskeletal:  - neg musculoskeletal ROS     GI/Hepatic:     (+)        bowel prep,            Renal/Genitourinary:  - neg Renal ROS     Endo:     (+)               Obesity,       Psychiatric/Substance Use:     (+) psychiatric history bipolar and depression       Infectious Disease:  - neg infectious disease ROS     Malignancy:  - neg malignancy ROS     Other:            Physical Exam    Airway        Mallampati: III   TM distance: < 3 FB   Neck ROM: full   Mouth opening: > 3 cm    Respiratory Devices and Support         Dental     Comment: Full upper and lower front caps    (+) Multiple crowns, permanant bridges      Cardiovascular          Rhythm and rate: regular and normal     Pulmonary           breath sounds clear to auscultation       OUTSIDE LABS:  CBC:   Lab Results   Component Value Date    WBC 7.6 10/03/2024    WBC 7.2 07/19/2023    HGB 14.0 10/03/2024    HGB 14.4 07/19/2023    HCT 41.9 10/03/2024    HCT 43.3 07/19/2023     10/03/2024     07/19/2023     BMP:   Lab Results   Component Value Date     10/03/2024     07/19/2023    POTASSIUM 4.1 10/03/2024    POTASSIUM 4.1 07/19/2023    CHLORIDE 106 10/03/2024    CHLORIDE 106 07/19/2023    CO2 25 10/03/2024    CO2 25 07/19/2023    BUN 15.6 10/03/2024    BUN 16.9 07/19/2023    CR 1.00 10/03/2024    CR 1.02 07/19/2023     (H) 10/03/2024     (H) 07/19/2023     COAGS: No results found for: \"PTT\", \"INR\", \"FIBR\"  POC: No results found for: \"BGM\", \"HCG\", \"HCGS\"  HEPATIC:   Lab Results   Component Value Date    ALBUMIN 4.4 10/03/2024    PROTTOTAL 7.0 10/03/2024    ALT 74 (H) 10/03/2024    AST 42 10/03/2024    ALKPHOS 68 10/03/2024    BILITOTAL 0.3 10/03/2024     OTHER:   Lab Results   Component Value Date    A1C 5.9 (H) 07/19/2023    GERMÁN 9.0 10/03/2024    " "TSH 2.37 10/03/2024       Anesthesia Plan    ASA Status:  3    NPO Status:  NPO Appropriate (last drink 0300)    Anesthesia Type: MAC.     - Reason for MAC: straight local not clinically adequate              Consents    Anesthesia Plan(s) and associated risks, benefits, and realistic alternatives discussed. Questions answered and patient/representative(s) expressed understanding.     - Discussed:     - Discussed with:  Patient      - Extended Intubation/Ventilatory Support Discussed: No.      - Patient is DNR/DNI Status: No     Use of blood products discussed: No .     Postoperative Care            Comments:    Other Comments: Chart reviewed hl -     Risks and benefits of MAC anesthetic discussed including dental damage, aspiration, loss of airway, conversion to general anesthetic, CV complications, MI, stroke, death. Pt wishes to proceed.              ASHWINI Martel CNP    I have reviewed the pertinent notes and labs in the chart from the past 30 days.  Any updates or changes from those notes are reflected in this note.    Clinically Significant Risk Factors Present on Admission                   # Hypertension: Noted on problem list           # Obesity: Estimated body mass index is 36.34 kg/m  as calculated from the following:    Height as of 2/3/25: 1.727 m (5' 8\").    Weight as of 2/3/25: 108.4 kg (239 lb).                "

## 2025-02-10 NOTE — OR NURSING
Called pt for preop PAT scheduled on 2/17/25 with [facility] for a colonoscopy.  He is ill with cough and runny nose. Aware to cancel pre op.  Message sent to pool.

## 2025-02-11 ENCOUNTER — NURSE TRIAGE (OUTPATIENT)
Dept: FAMILY MEDICINE | Facility: OTHER | Age: 75
End: 2025-02-11
Payer: COMMERCIAL

## 2025-02-11 ENCOUNTER — APPOINTMENT (OUTPATIENT)
Dept: GENERAL RADIOLOGY | Facility: OTHER | Age: 75
End: 2025-02-11
Attending: PHYSICIAN ASSISTANT
Payer: COMMERCIAL

## 2025-02-11 ENCOUNTER — HOSPITAL ENCOUNTER (EMERGENCY)
Facility: OTHER | Age: 75
Discharge: HOME | End: 2025-02-11
Attending: PHYSICIAN ASSISTANT
Payer: COMMERCIAL

## 2025-02-11 VITALS
HEART RATE: 61 BPM | SYSTOLIC BLOOD PRESSURE: 144 MMHG | HEIGHT: 68 IN | TEMPERATURE: 98.9 F | WEIGHT: 230 LBS | OXYGEN SATURATION: 96 % | RESPIRATION RATE: 26 BRPM | BODY MASS INDEX: 34.86 KG/M2 | DIASTOLIC BLOOD PRESSURE: 96 MMHG

## 2025-02-11 DIAGNOSIS — R05.9 COUGH: ICD-10-CM

## 2025-02-11 DIAGNOSIS — M94.0 COSTOCHONDRITIS: ICD-10-CM

## 2025-02-11 DIAGNOSIS — R07.9 CHEST PAIN: ICD-10-CM

## 2025-02-11 LAB
ANION GAP SERPL CALCULATED.3IONS-SCNC: 11 MMOL/L (ref 7–15)
ATRIAL RATE - MUSE: 68 BPM
BASOPHILS # BLD AUTO: 0.1 10E3/UL (ref 0–0.2)
BASOPHILS NFR BLD AUTO: 1 %
BUN SERPL-MCNC: 13.8 MG/DL (ref 8–23)
CALCIUM SERPL-MCNC: 9.5 MG/DL (ref 8.8–10.4)
CHLORIDE SERPL-SCNC: 103 MMOL/L (ref 98–107)
CREAT SERPL-MCNC: 0.94 MG/DL (ref 0.67–1.17)
D DIMER PPP FEU-MCNC: 0.59 UG/ML FEU (ref 0–0.5)
DIASTOLIC BLOOD PRESSURE - MUSE: NORMAL MMHG
EGFRCR SERPLBLD CKD-EPI 2021: 85 ML/MIN/1.73M2
EOSINOPHIL # BLD AUTO: 0.6 10E3/UL (ref 0–0.7)
EOSINOPHIL NFR BLD AUTO: 8 %
ERYTHROCYTE [DISTWIDTH] IN BLOOD BY AUTOMATED COUNT: 14.1 % (ref 10–15)
FLUAV RNA SPEC QL NAA+PROBE: NEGATIVE
FLUBV RNA RESP QL NAA+PROBE: NEGATIVE
GLUCOSE SERPL-MCNC: 137 MG/DL (ref 70–99)
HCO3 SERPL-SCNC: 27 MMOL/L (ref 22–29)
HCT VFR BLD AUTO: 42.1 % (ref 40–53)
HGB BLD-MCNC: 14.4 G/DL (ref 13.3–17.7)
HOLD SPECIMEN: NORMAL
HOLD SPECIMEN: NORMAL
IMM GRANULOCYTES # BLD: 0 10E3/UL
IMM GRANULOCYTES NFR BLD: 0 %
INTERPRETATION ECG - MUSE: NORMAL
LYMPHOCYTES # BLD AUTO: 1.9 10E3/UL (ref 0.8–5.3)
LYMPHOCYTES NFR BLD AUTO: 24 %
MCH RBC QN AUTO: 31.2 PG (ref 26.5–33)
MCHC RBC AUTO-ENTMCNC: 34.2 G/DL (ref 31.5–36.5)
MCV RBC AUTO: 91 FL (ref 78–100)
MONOCYTES # BLD AUTO: 0.5 10E3/UL (ref 0–1.3)
MONOCYTES NFR BLD AUTO: 6 %
NEUTROPHILS # BLD AUTO: 4.7 10E3/UL (ref 1.6–8.3)
NEUTROPHILS NFR BLD AUTO: 61 %
NRBC # BLD AUTO: 0 10E3/UL
NRBC BLD AUTO-RTO: 0 /100
P AXIS - MUSE: -13 DEGREES
PLATELET # BLD AUTO: 311 10E3/UL (ref 150–450)
POTASSIUM SERPL-SCNC: 3.3 MMOL/L (ref 3.4–5.3)
PR INTERVAL - MUSE: 178 MS
QRS DURATION - MUSE: 170 MS
QT - MUSE: 444 MS
QTC - MUSE: 472 MS
R AXIS - MUSE: 8 DEGREES
RBC # BLD AUTO: 4.61 10E6/UL (ref 4.4–5.9)
RSV RNA SPEC NAA+PROBE: NEGATIVE
SARS-COV-2 RNA RESP QL NAA+PROBE: NEGATIVE
SODIUM SERPL-SCNC: 141 MMOL/L (ref 135–145)
SYSTOLIC BLOOD PRESSURE - MUSE: NORMAL MMHG
T AXIS - MUSE: 13 DEGREES
TROPONIN T SERPL HS-MCNC: 17 NG/L
TROPONIN T SERPL HS-MCNC: 19 NG/L
VENTRICULAR RATE- MUSE: 68 BPM
WBC # BLD AUTO: 7.7 10E3/UL (ref 4–11)

## 2025-02-11 PROCEDURE — 99285 EMERGENCY DEPT VISIT HI MDM: CPT | Mod: 25 | Performed by: PHYSICIAN ASSISTANT

## 2025-02-11 PROCEDURE — 93005 ELECTROCARDIOGRAM TRACING: CPT | Performed by: PHYSICIAN ASSISTANT

## 2025-02-11 PROCEDURE — 96374 THER/PROPH/DIAG INJ IV PUSH: CPT | Performed by: PHYSICIAN ASSISTANT

## 2025-02-11 PROCEDURE — 87637 SARSCOV2&INF A&B&RSV AMP PRB: CPT | Performed by: PHYSICIAN ASSISTANT

## 2025-02-11 PROCEDURE — 80048 BASIC METABOLIC PNL TOTAL CA: CPT | Performed by: PHYSICIAN ASSISTANT

## 2025-02-11 PROCEDURE — 87798 DETECT AGENT NOS DNA AMP: CPT | Performed by: PHYSICIAN ASSISTANT

## 2025-02-11 PROCEDURE — 36415 COLL VENOUS BLD VENIPUNCTURE: CPT | Performed by: PHYSICIAN ASSISTANT

## 2025-02-11 PROCEDURE — 93010 ELECTROCARDIOGRAM REPORT: CPT | Performed by: INTERNAL MEDICINE

## 2025-02-11 PROCEDURE — 71046 X-RAY EXAM CHEST 2 VIEWS: CPT

## 2025-02-11 PROCEDURE — 250N000011 HC RX IP 250 OP 636: Performed by: PHYSICIAN ASSISTANT

## 2025-02-11 PROCEDURE — 85379 FIBRIN DEGRADATION QUANT: CPT | Performed by: PHYSICIAN ASSISTANT

## 2025-02-11 PROCEDURE — 85004 AUTOMATED DIFF WBC COUNT: CPT | Performed by: PHYSICIAN ASSISTANT

## 2025-02-11 PROCEDURE — 99284 EMERGENCY DEPT VISIT MOD MDM: CPT | Performed by: PHYSICIAN ASSISTANT

## 2025-02-11 PROCEDURE — 84484 ASSAY OF TROPONIN QUANT: CPT | Performed by: PHYSICIAN ASSISTANT

## 2025-02-11 RX ORDER — AZITHROMYCIN 250 MG/1
TABLET, FILM COATED ORAL
Qty: 6 TABLET | Refills: 0 | Status: SHIPPED | OUTPATIENT
Start: 2025-02-11 | End: 2025-02-16

## 2025-02-11 RX ORDER — BENZONATATE 100 MG/1
100 CAPSULE ORAL 3 TIMES DAILY PRN
Qty: 21 CAPSULE | Refills: 0 | Status: SHIPPED | OUTPATIENT
Start: 2025-02-11 | End: 2025-02-18

## 2025-02-11 RX ORDER — DEXAMETHASONE SODIUM PHOSPHATE 10 MG/ML
10 INJECTION, SOLUTION INTRAMUSCULAR; INTRAVENOUS ONCE
Status: COMPLETED | OUTPATIENT
Start: 2025-02-11 | End: 2025-02-11

## 2025-02-11 RX ADMIN — DEXAMETHASONE SODIUM PHOSPHATE 10 MG: 10 INJECTION, SOLUTION INTRAMUSCULAR; INTRAVENOUS at 15:40

## 2025-02-11 ASSESSMENT — COLUMBIA-SUICIDE SEVERITY RATING SCALE - C-SSRS
1. IN THE PAST MONTH, HAVE YOU WISHED YOU WERE DEAD OR WISHED YOU COULD GO TO SLEEP AND NOT WAKE UP?: NO
6. HAVE YOU EVER DONE ANYTHING, STARTED TO DO ANYTHING, OR PREPARED TO DO ANYTHING TO END YOUR LIFE?: NO
2. HAVE YOU ACTUALLY HAD ANY THOUGHTS OF KILLING YOURSELF IN THE PAST MONTH?: NO

## 2025-02-11 ASSESSMENT — ACTIVITIES OF DAILY LIVING (ADL)
ADLS_ACUITY_SCORE: 41

## 2025-02-11 ASSESSMENT — ENCOUNTER SYMPTOMS
COUGH: 1
FATIGUE: 1

## 2025-02-11 NOTE — ED TRIAGE NOTES
Pt arrives via private vehicle with wife from home with c/o cough for about a week and a half, yesterday started having severe chest pain when coughing and low pain in chest when not coughing.      Triage Assessment (Adult)       Row Name 02/11/25 1235          Triage Assessment    Airway WDL WDL        Respiratory WDL    Respiratory WDL X        Skin Circulation/Temperature WDL    Skin Circulation/Temperature WDL WDL        Cardiac WDL    Cardiac WDL X        Peripheral/Neurovascular WDL    Peripheral Neurovascular WDL WDL        Cognitive/Neuro/Behavioral WDL    Cognitive/Neuro/Behavioral WDL WDL

## 2025-02-11 NOTE — DISCHARGE INSTRUCTIONS
Get plenty of fluids and rest.  As discussed, your studies appeared very stable for us today.  It is not entirely clear what is causing her discomfort at this time but it seems less likely to be cardiac related.  Think you may be suffering from inflammatory/costochondritis chest discomfort, see attached information.  You can alternate Tylenol and ibuprofen every 4 hours.  We will start you on a Z-Yves that you can take to help with inflammation in case there is an early pneumonia occurring.  Did give you a one-time dose of steroid in the ED that should help with inflammation.  Will send you home with Tessalon Perles as well which are strong cough drops.  I would expect gradual improvement of symptoms in the coming 3 to 5 days.  Should return for having worsening pain, increased shortness of breath, intractable fevers, nausea or vomiting etc.  Continue to follow-up with PCP on Friday as already scheduled.

## 2025-02-11 NOTE — ED PROVIDER NOTES
History     Chief Complaint   Patient presents with    Chest Pain    Cough     HPI  Umer Archuleta is a 74 year old male who presents to the ED for evaluation of chest pain, cough. Pt arrives via private vehicle with wife from home with c/o cough for about a week and a half, yesterday started having severe chest pain when coughing and low pain in chest when not coughing.     Allergies:  No Known Allergies    Problem List:    Patient Active Problem List    Diagnosis Date Noted    Long-term current use of lithium 10/03/2024     Priority: Medium    GINA on CPAP 10/03/2024     Priority: Medium    Bipolar I disorder, most recent episode (or current) manic, mild (H) 10/03/2024     Priority: Medium    Recurrent major depressive disorder, in full remission 09/11/2017     Priority: Medium    Morbid obesity due to excess calories (H) 09/11/2017     Priority: Medium    Weight gain 07/19/2017     Priority: Medium    ACP (advance care planning) 09/21/2016     Priority: Medium     Advance Care Planning 9/21/2016: ACP Review of Chart / Resources Provided:  Reviewed chart for advance care plan.  Umer Archuleta has been provided information and resources to begin or update their advance care plan.  Added by Niyah Frost            Recurrent major depressive disorder, in remission 09/15/2016     Priority: Medium    Benign essential hypertension 09/15/2016     Priority: Medium    Umbilical hernia without obstruction and without gangrene 09/15/2016     Priority: Medium    ASNHL (asymmetrical sensorineural hearing loss) 10/07/2013     Priority: Medium    Nocturia 08/14/2013     Priority: Medium    Tobacco abuse, in remission      Priority: Medium    ED (erectile dysfunction)      Priority: Medium    Bipolar I disorder, single manic episode (H) 10/23/2012     Priority: Medium     Problem list name updated by automated process. Provider to review      Advanced care planning/counseling discussion 10/23/2012     Priority:  "Medium        Past Medical History:    Past Medical History:   Diagnosis Date    Bipolar I disorder, single manic episode (H) 10/23/2012    ED (erectile dysfunction)     Essential hypertension with goal blood pressure less than 140/90 09/15/2016    Morbid obesity due to excess calories (H) 09/11/2017    GINA (obstructive sleep apnea)     Recurrent major depressive disorder, in remission 09/15/2016    Tobacco abuse, in remission     Umbilical hernia without obstruction and without gangrene 09/15/2016       Past Surgical History:    History reviewed. No pertinent surgical history.    Family History:    History reviewed. No pertinent family history.    Social History:  Marital Status:   [2]  Social History     Tobacco Use    Smoking status: Former     Passive exposure: Never    Smokeless tobacco: Never   Vaping Use    Vaping status: Never Used   Substance Use Topics    Alcohol use: Yes     Comment: rarely    Drug use: Not Currently        Medications:    azithromycin (ZITHROMAX) 250 MG tablet  benzonatate (TESSALON) 100 MG capsule  amLODIPine (NORVASC) 5 MG tablet  aspirin EC 81 MG tablet  Cholecalciferol (VITAMIN D3) 5000 UNIT/ML LIQD  DULoxetine (CYMBALTA) 60 MG capsule  ipratropium (ATROVENT) 0.03 % nasal spray  lithium 300 MG capsule  losartan-hydrochlorothiazide (HYZAAR) 50-12.5 MG tablet  mineral oil-hydrophilic petrolatum (AQUAPHOR) external ointment  omeprazole (PRILOSEC) 20 MG DR capsule  polyethylene glycol (GAVILYTE-G) 236 g suspension  sildenafil (VIAGRA) 100 MG tablet  sodium chloride (OCEAN) 0.65 % nasal spray          Review of Systems   Constitutional:  Positive for fatigue.   Respiratory:  Positive for cough.    Cardiovascular:  Positive for chest pain.       Physical Exam   BP: (!) 141/90  Pulse: 71  Temp: 98.9  F (37.2  C)  Resp: 16  Height: 172.7 cm (5' 8\")  Weight: 104.3 kg (230 lb)  SpO2: 100 %      Physical Exam  Constitutional:       General: He is not in acute distress.     Appearance: He " is well-developed. He is not ill-appearing or diaphoretic.   HENT:      Head: Normocephalic and atraumatic.   Eyes:      General: No scleral icterus.     Conjunctiva/sclera: Conjunctivae normal.   Neck:      Vascular: No carotid bruit.   Cardiovascular:      Rate and Rhythm: Normal rate and regular rhythm.   Pulmonary:      Effort: Pulmonary effort is normal.      Breath sounds: Normal breath sounds.   Abdominal:      Palpations: Abdomen is soft.      Tenderness: There is no abdominal tenderness.   Musculoskeletal:         General: No deformity.      Cervical back: Neck supple.      Right lower leg: No edema.      Left lower leg: No edema.   Skin:     General: Skin is warm and dry.      Coloration: Skin is not jaundiced.      Findings: No rash.   Neurological:      Mental Status: He is alert. Mental status is at baseline.   Psychiatric:         Mood and Affect: Mood normal.         Behavior: Behavior normal.         ED Course        Procedures         EKG read at 1311. HR 68, NSR, RBBB. This appears to be new for him.     Critical Care time:  none              Results for orders placed or performed during the hospital encounter of 02/11/25 (from the past 24 hours)   CBC with platelets differential    Narrative    The following orders were created for panel order CBC with platelets differential.  Procedure                               Abnormality         Status                     ---------                               -----------         ------                     CBC with platelets and d...[[id number]]                      Final result                 Please view results for these tests on the individual orders.   Basic metabolic panel   Result Value Ref Range    Sodium 141 135 - 145 mmol/L    Potassium 3.3 (L) 3.4 - 5.3 mmol/L    Chloride 103 98 - 107 mmol/L    Carbon Dioxide (CO2) 27 22 - 29 mmol/L    Anion Gap 11 7 - 15 mmol/L    Urea Nitrogen 13.8 8.0 - 23.0 mg/dL    Creatinine 0.94 0.67 - 1.17 mg/dL    GFR  Estimate 85 >60 mL/min/1.73m2    Calcium 9.5 8.8 - 10.4 mg/dL    Glucose 137 (H) 70 - 99 mg/dL   Troponin T, High Sensitivity   Result Value Ref Range    Troponin T, High Sensitivity 17 <=22 ng/L   CBC with platelets and differential   Result Value Ref Range    WBC Count 7.7 4.0 - 11.0 10e3/uL    RBC Count 4.61 4.40 - 5.90 10e6/uL    Hemoglobin 14.4 13.3 - 17.7 g/dL    Hematocrit 42.1 40.0 - 53.0 %    MCV 91 78 - 100 fL    MCH 31.2 26.5 - 33.0 pg    MCHC 34.2 31.5 - 36.5 g/dL    RDW 14.1 10.0 - 15.0 %    Platelet Count 311 150 - 450 10e3/uL    % Neutrophils 61 %    % Lymphocytes 24 %    % Monocytes 6 %    % Eosinophils 8 %    % Basophils 1 %    % Immature Granulocytes 0 %    NRBCs per 100 WBC 0 <1 /100    Absolute Neutrophils 4.7 1.6 - 8.3 10e3/uL    Absolute Lymphocytes 1.9 0.8 - 5.3 10e3/uL    Absolute Monocytes 0.5 0.0 - 1.3 10e3/uL    Absolute Eosinophils 0.6 0.0 - 0.7 10e3/uL    Absolute Basophils 0.1 0.0 - 0.2 10e3/uL    Absolute Immature Granulocytes 0.0 <=0.4 10e3/uL    Absolute NRBCs 0.0 10e3/uL   Influenza A/B, RSV and SARS-CoV2 PCR (COVID-19) Nose    Specimen: Nose; Swab   Result Value Ref Range    Influenza A PCR Negative Negative    Influenza B PCR Negative Negative    RSV PCR Negative Negative    SARS CoV2 PCR Negative Negative    Narrative    Testing was performed using the Xpert Xpress CoV2/Flu/RSV Assay on the [employer] GeneXpert Instrument. This test should be ordered for the detection of SARS-CoV2, influenza, and RSV viruses in individuals with signs and symptoms of respiratory tract infection. This test is for in vitro diagnostic use under the [place] FDA for laboratories certified under CLIA to perform high or moderate complexity testing. This test has been [place] FDA cleared. A negative result does not rule out the presence of PCR inhibitors in the specimen or target RNA in concentration below the limit of detection for the assay. If only one viral target is positive but coinfection with multiple targets  is suspected, the sample should be re-tested with another FDA cleared, approved, or authorized test, if coninfection would change clinical management. This test was validated by the [facility]. These laboratories are certified under the Clinical Laboratory Improvement Amendments of 1988 (CLIA-88) as qualified to perfom high complexity laboratory testing.   Extra Tube    Narrative    The following orders were created for panel order Extra Tube.  Procedure                               Abnormality         Status                     ---------                               -----------         ------                     Extra Blue Top Tube[[id number]]                              Final result               Extra Red Top Tube[[id number]]                               Final result                 Please view results for these tests on the individual orders.   Extra Blue Top Tube   Result Value Ref Range    Hold Specimen JIC    Extra Red Top Tube   Result Value Ref Range    Hold Specimen JIC    D dimer quantitative   Result Value Ref Range    D-Dimer Quantitative 0.59 (H) 0.00 - 0.50 ug/mL FEU    Narrative    This D-dimer assay is intended for use in conjunction with a clinical pretest probability assessment model to exclude pulmonary embolism (PE) and deep venous thrombosis (DVT) in outpatients suspected of PE or DVT. The cut-off value is 0.50 ug/mL FEU.    For patients 50 years of age or older, the application of age-adjusted cut-off values for D-Dimer may increase the specificity without significant effect on sensitivity. The literature suggested calculation age adjusted cut-off in ug/L = age in years x 10 ug/L. The results in this laboratory are reported as ug/mL rather than ug/L. The calculation for age adjusted cut off in ug/mL= age in years x 0.01 ug/mL. For example, the cut off for a 76 year old male is 76 x 0.01 ug/mL = 0.76 ug/mL (760 ug/L).    SANDHYA Le et al. Age adjusted D-dimer cut-off levels  to rule out pulmonary embolism: The ADJUST-PE Study. MOO 2014;311:1034-1288.; HJ Gui et al. Diagnostic accuracy of conventional or age adjusted D-dimer cutoff values in older patients with suspected venous thromboembolism. Systemic review and meta-analysis. BMJ 2013:346:f2492.   XR Chest 2 Views    Narrative    PROCEDURE:  XR CHEST 2 VIEWS    HISTORY: cough, left sided chest pain, .    COMPARISON:  4/27/2022    FINDINGS:  The cardiomediastinal contours are stable. Epicardial fat is chronic.  The trachea is midline.  No focal consolidation, effusion or pneumothorax.    No suspicious osseous lesion or subdiaphragmatic free air.      Impression    IMPRESSION:      No focal consolidation.      URBAN SEGURA MD         SYSTEM ID:  [id number]   Troponin T, High Sensitivity   Result Value Ref Range    Troponin T, High Sensitivity 19 <=22 ng/L       Medications   dexAMETHasone PF (DECADRON) injection 10 mg (10 mg Intravenous $Given 2/11/25 1540)       Assessments & Plan (with Medical Decision Making)   Pt nontoxic in NAD. Heart, lung, bowel sounds normal, abd soft, nontender to palpation, nondistended. VSS, afebrile.     He does have left sided chest reproducible tenderness to palpation.     He has no leukocytosis, normal hemoglobin, negative viral panel, 2 negative troponins with no significant change, very mild hypokalemia.  He did have what appeared to be a new right bundle branch block, therefore we did obtain a D-dimer, he does have an age-adjusted negative D-dimer.    Chest x-ray:  The cardiomediastinal contours are stable. Epicardial fat is chronic.  The trachea is midline.  No focal consolidation, effusion or pneumothorax.    No suspicious osseous lesion or subdiaphragmatic free air.    From discharge:  As discussed, your studies appeared very stable for us today.  It is not entirely clear what is causing her discomfort at this time but it seems less likely to be cardiac related.  Think you may be  suffering from inflammatory/costochondritis chest discomfort, see attached information.  You can alternate Tylenol and ibuprofen every 4 hours.  We will start you on a Z-Yves that you can take to help with inflammation in case there is an early pneumonia occurring.  Did give you a one-time dose of steroid in the ED that should help with inflammation.  Will send you home with Tessalon Perles as well which are strong cough drops.  I would expect gradual improvement of symptoms in the coming 3 to 5 days.  Should return for having worsening pain, increased shortness of breath, intractable fevers, nausea or vomiting etc.  Continue to follow-up with PCP on Friday as already scheduled.    Strict return precautions are given to the pt, they will return if symptoms are worsening or concerning. The pt understands and agrees with the plan and they are discharged.     Brandon Jules PA-C    I have reviewed the nursing notes.    I have reviewed the findings, diagnosis, plan and need for follow up with the patient.        Discharge Medication List as of 2/11/2025  4:01 PM        START taking these medications    Details   azithromycin (ZITHROMAX) 250 MG tablet Take 2 tablets (500 mg) by mouth daily for 1 day, THEN 1 tablet (250 mg) daily for 4 days., Disp-6 tablet, R-0, E-Prescribe      benzonatate (TESSALON) 100 MG capsule Take 1 capsule (100 mg) by mouth 3 times daily as needed., Disp-21 capsule, R-0, E-Prescribe             Final diagnoses:   Cough   Costochondritis   Chest pain       2/11/2025   [facility] AND Brandon Cruz PA  02/11/25 [id number]

## 2025-02-11 NOTE — TELEPHONE ENCOUNTER
"Wife states she can bring patient to ER now. Zena Santacruz RN on 2/11/2025 at 12:17 PM      Reason for Disposition   Chest pain  (Exception: MILD central chest pain, present only when coughing.)    Additional Information   Negative: SEVERE difficulty breathing (e.g., struggling for each breath, speaks in single words)   Negative: Bluish (or gray) lips or face now   Negative: [1] Difficulty breathing AND [2] exposure to flames, smoke, or fumes   Negative: [1] Stridor AND [2] difficulty breathing   Negative: Sounds like a life-threatening emergency to the triager   Negative: [1] Previous asthma attacks AND [2] this feels like asthma attack   Negative: Dry cough (non-productive;  no sputum or minimal clear sputum)   Negative: [1] MODERATE difficulty breathing (e.g., speaks in phrases, SOB even at rest, pulse 100-120) AND [2] still present when not coughing    Answer Assessment - Initial Assessment Questions  1. ONSET: \"When did the cough begin?\"       10 days ago     2. SEVERITY: \"How bad is the cough today?\"       Has had terrible coughing fits     3. SPUTUM: \"Describe the color of your sputum\" (none, dry cough; clear, white, yellow, green)      Clear     4. HEMOPTYSIS: \"Are you coughing up any blood?\" If so ask: \"How much?\" (flecks, streaks, tablespoons, etc.)      Denies     5. DIFFICULTY BREATHING: \"Are you having difficulty breathing?\" If Yes, ask: \"How bad is it?\" (e.g., mild, moderate, severe)     - MILD: No SOB at rest, mild SOB with walking, speaks normally in sentences, can lie down, no retractions, pulse < 100.     - MODERATE: SOB at rest, SOB with minimal exertion and prefers to sit, cannot lie down flat, speaks in phrases, mild retractions, audible wheezing, pulse 100-120.     - SEVERE: Very SOB at rest, speaks in single words, struggling to breathe, sitting hunched forward, retractions, pulse > 120       Denies     6. FEVER: \"Do you have a fever?\" If Yes, ask: \"What is your temperature, how was it " "measured, and when did it start?\"      Denies     7. CARDIAC HISTORY: \"Do you have any history of heart disease?\" (e.g., heart attack, congestive heart failure)       Denies     8. LUNG HISTORY: \"Do you have any history of lung disease?\"  (e.g., pulmonary embolus, asthma, emphysema)     Years ago was seen at the [place] and seen again a year later for a spot on his lung but it was unchanged     9. PE RISK FACTORS: \"Do you have a history of blood clots?\" (or: recent major surgery, recent prolonged travel, bedridden)      Denies     10. OTHER SYMPTOMS: \"Do you have any other symptoms?\" (e.g., runny nose, wheezing, chest pain)        Left sided dull ache in his left chest, blowing his nose constantly - clear discharge, when he coughs the left-sided chest pain is sharp and very painful, it is otherwise a constant, dull ache, progressive weakness/just getting worse and worse each day    11. PREGNANCY: \"Is there any chance you are pregnant?\" \"When was your last menstrual period?\"        No     12. TRAVEL: \"Have you traveled out of the country in the last month?\" (e.g., travel history, exposures)        Denies    Protocols used: Cough - Acute [id number]    "

## 2025-02-12 LAB
B PARAPERT DNA SPEC QL NAA+PROBE: NOT DETECTED
B PERT DNA SPEC QL NAA+PROBE: NOT DETECTED

## 2025-02-17 ENCOUNTER — ANESTHESIA (OUTPATIENT)
Dept: SURGERY | Facility: HOSPITAL | Age: 75
End: 2025-02-17
Payer: COMMERCIAL

## 2025-03-07 NOTE — PROGRESS NOTES
"Chief Complaint   Patient presents with    Ear Problem    Follow Up     1 month follow up ears       Patient returns to ENT for concerns of PND. He was given an order for Atrovent without.   He has ongoing issues with use post nasal drainage, cough. He did start Claritin about 2-3 weeks ago. Denies nasal obstruction.   Cough has been present for several years without full relief.   He does frequently blow noses without relief.   He did use Flonase in the past but developed epistaxis.     He reports the PND comes/ goes  Cough a few times and then passes.   Denies dyspnea.   He has symptoms throughout the year and remains year long.   He does use Randolph med rinses PRN.   No prior allergy testing.   CXR negative, 2/11/25        Gradual hearing loss over the last several years. Hx of noise exposure while working, but did use HP. He does have a harder time hearing at home and unable to understand words.      Denies COM or otologic surgeries.   Denies otalgia, otorrhea  Denies worrisome tinnitus  Denies fluctuating hearing loss or tinnitus.   Denies vertigo or facial paraesthesia.   No significant otologic family hx.    MRI of IAC- Negative    Audiogram 12/26/24  Type A tympanograms  Thresholds are Normal range-slight loss sloping to severe left and moderate severe right asymmetrical sensorineural hearing loss.  SRT=PTA  WRS-  Right- 100%@60dB  Left- 100% @60 dB      ROS- SEE HPI  BP (!) 142/88 (BP Location: Right arm, Patient Position: Sitting, Cuff Size: Adult Large)   Pulse 71   Temp 97.1  F (36.2  C) (Tympanic)   Resp 18   Ht 1.727 m (5' 8\")   Wt 106.6 kg (235 lb)   SpO2 99%   BMI 35.73 kg/m          General - The patient is well nourished and well developed, and appears to have good nutritional status.  Alert and oriented to person and place, answers questions and cooperates with examination appropriately.   Head and Face - Normocephalic and atraumatic, with no gross asymmetry noted.  The facial nerve is intact, " with strong symmetric movements.  Voice and Breathing - The patient was breathing comfortably without the use of accessory muscles. There was no wheezing, stridor, or stertor.  The patients voice was clear and strong, and had appropriate pitch and quality.  Ears - The external auditory canals are patent, the tympanic membranes are intact without effusion, retraction or mass.  Bony landmarks are intact.  Eyes - Extraocular movements intact, and the pupils were reactive to light.  Sclera were not icteric or injected, conjunctiva were pink and moist.  Mouth - Examination of the oral cavity showed pink, healthy oral mucosa. No lesions or ulcerations noted.  The tongue was mobile and midline, and the dentition were in good condition.    Throat - The walls of the oropharynx were smooth, pink, moist, symmetric, and had no lesions or ulcerations.  The tonsillar pillars and soft palate were symmetric.  The uvula was midline on elevation.    Neck - Normal midline excursion of the laryngotracheal complex during swallowing.  Full range of motion on passive movement.  Palpation of the occipital, submental, submandibular, internal jugular chain, and supraclavicular nodes did not demonstrate any abnormal lymph nodes or masses.  Palpation of the thyroid was soft and smooth, with no nodules or goiter appreciated.  The trachea was mobile and midline.  Nose - External contour is symmetric, no gross deflection or scars.  Nasal mucosa is pink and moist with no abnormal mucus.    Heart- Regular rate  Lungs- Clear anterior and posterior., no wheeze.     Flexible Endoscopy -     The patient was counseled that their symptoms and history require a direct visualization with an endoscopy procedure.  They understood and we proceeded with a fiberoptic examination.  First I sprayed both sides of the nose with a mixture of lidocaine and neosynephrine.  I then passed the scope through the nasal cavity.    The nasal cavity was unremarkable. Mild  clear rhinorrhea throughout The nasopharynx was mucosally covered and symmetric.  The Eustachian tube openings were unobstructed.  Going further down I had a clear view of the base of tongue which had normal appearing lingual tonsillar tissue.  The base of tongue was free of lesions, and the vallecula was open.  The epiglottis was smooth and mucosally covered.  The supraglottic larynx was then clearly visualized.  The vocal cords moved smoothly and symmetrically, they were pearly white with small pinpoint VC nodules.  The pyriform sinuses were open, and the limited view of the postcricoid region did not show any lesions.      ASSESSMENT/ PLAN:    ICD-10-CM    1. Post-nasal drip  R09.82 lidocaine 2%-oxymetazoline 0.025% nasal solution 2 spray      2. Chronic cough  R05.3       3. Sensorineural hearing loss, asymmetrical  H90.3               Recommended HAC.   He will verify insurance coverage  HP  Annual audiogram.     He will continue with Claritin, for additional 3-4 weeks as he recently started this medication.   Start Randolph med rinses. Rinse 1-2 times as needed.     Maintain good laryngeal cares, water intake.   Follow reflux measures. He is Prilosec and reports no complaints    Follow up after the above, he will contact us regarding the results of AH.   Recheck larynx in 2-3 months  He does have small bilateral VC nodules  Consider ST    Irritable larynx briefly reviewed.     Minda Ugarte PA-C  ENT  [facility], [place]

## 2025-03-11 ENCOUNTER — TELEPHONE (OUTPATIENT)
Dept: SURGERY | Facility: OTHER | Age: 75
End: 2025-03-11

## 2025-03-11 ENCOUNTER — OFFICE VISIT (OUTPATIENT)
Dept: OTOLARYNGOLOGY | Facility: OTHER | Age: 75
End: 2025-03-11
Attending: PHYSICIAN ASSISTANT
Payer: COMMERCIAL

## 2025-03-11 VITALS
TEMPERATURE: 97.1 F | SYSTOLIC BLOOD PRESSURE: 142 MMHG | HEIGHT: 68 IN | HEART RATE: 71 BPM | BODY MASS INDEX: 35.61 KG/M2 | RESPIRATION RATE: 18 BRPM | OXYGEN SATURATION: 99 % | WEIGHT: 235 LBS | DIASTOLIC BLOOD PRESSURE: 88 MMHG

## 2025-03-11 DIAGNOSIS — H90.3 SENSORINEURAL HEARING LOSS, ASYMMETRICAL: ICD-10-CM

## 2025-03-11 DIAGNOSIS — R09.82 POST-NASAL DRIP: Primary | ICD-10-CM

## 2025-03-11 DIAGNOSIS — Z12.11 SPECIAL SCREENING FOR MALIGNANT NEOPLASMS, COLON: Primary | ICD-10-CM

## 2025-03-11 DIAGNOSIS — R05.3 CHRONIC COUGH: ICD-10-CM

## 2025-03-11 PROCEDURE — G0463 HOSPITAL OUTPT CLINIC VISIT: HCPCS | Mod: 25

## 2025-03-11 PROCEDURE — 31575 DIAGNOSTIC LARYNGOSCOPY: CPT | Performed by: PHYSICIAN ASSISTANT

## 2025-03-11 RX ORDER — POLYETHYLENE GLYCOL-3350 AND ELECTROLYTES 236; 6.74; 5.86; 2.97; 22.74 G/274.31G; G/274.31G; G/274.31G; G/274.31G; G/274.31G
POWDER, FOR SOLUTION ORAL
Qty: 4000 ML | Refills: 0 | Status: SHIPPED | OUTPATIENT
Start: 2025-03-11

## 2025-03-11 RX ORDER — BISACODYL 5 MG
TABLET, DELAYED RELEASE (ENTERIC COATED) ORAL
Qty: 4 TABLET | Refills: 0 | Status: SHIPPED | OUTPATIENT
Start: 2025-03-11

## 2025-03-11 ASSESSMENT — PAIN SCALES - GENERAL: PAINLEVEL_OUTOF10: NO PAIN (0)

## 2025-03-11 NOTE — TELEPHONE ENCOUNTER
Discussed colonoscopy with Pt at ENT visit today  Gave handbook &  golytely prep sheet  Pt will call PcP for Preop  Sent prep to RX  Placed on PAT log  Updated Surg book

## 2025-03-11 NOTE — PATIENT INSTRUCTIONS
Continue with Claritin  Start Randolph med rinse. Rinse 1-2 times daily.   Maintain good laryngeal cares. Good water intake.     Hearing- Contact insurance for verification for hearing aid coverage.   Recommend hearing aid consult     Thank you for allowing YSABEL Sargent and our ENT team to participate in your care.  If your medications are too expensive, please give the nurse a call.  We can possibly change this medication.  If you have a scheduling or an appointment question please contact our Health Unit Coordinator at their direct line [phone].   ALL nursing questions or concerns can be directed to your ENT nurseJamia at: [phone].      IRRITABLE THROAT  Avoid throat clearing   Try to hard swallow or hum instead of throat clearing  Increase water   Decrease caffeine

## 2025-03-11 NOTE — LETTER
"3/11/2025      Umer Archuleta  [address] [facility] [address]      Dear Colleague,    Thank you for referring your patient, Umer Archuleta, to the [facility]. Please see a copy of my visit note below.    Chief Complaint   Patient presents with     Ear Problem     Follow Up     1 month follow up ears       Patient returns to ENT for concerns of PND. He was given an order for Atrovent without.   He has ongoing issues with use post nasal drainage, cough. He did start Claritin about 2-3 weeks ago. Denies nasal obstruction.   Cough has been present for several years without full relief.   He does frequently blow noses without relief.   He did use Flonase in the past but developed epistaxis.     He reports the PND comes/ goes  Cough a few times and then passes.   Denies dyspnea.   He has symptoms throughout the year and remains year long.   He does use Randolph med rinses PRN.   No prior allergy testing.   CXR negative, 2/11/25        Gradual hearing loss over the last several years. Hx of noise exposure while working, but did use HP. He does have a harder time hearing at home and unable to understand words.      Denies COM or otologic surgeries.   Denies otalgia, otorrhea  Denies worrisome tinnitus  Denies fluctuating hearing loss or tinnitus.   Denies vertigo or facial paraesthesia.   No significant otologic family hx.    MRI of IAC- Negative    Audiogram 12/26/24  Type A tympanograms  Thresholds are Normal range-slight loss sloping to severe left and moderate severe right asymmetrical sensorineural hearing loss.  SRT=PTA  WRS-  Right- 100%@60dB  Left- 100% @60 dB      ROS- SEE HPI  BP (!) 142/88 (BP Location: Right arm, Patient Position: Sitting, Cuff Size: Adult Large)   Pulse 71   Temp 97.1  F (36.2  C) (Tympanic)   Resp 18   Ht 1.727 m (5' 8\")   Wt 106.6 kg (235 lb)   SpO2 99%   BMI 35.73 kg/m          General - The patient is well nourished and well developed, and appears to " have good nutritional status.  Alert and oriented to person and place, answers questions and cooperates with examination appropriately.   Head and Face - Normocephalic and atraumatic, with no gross asymmetry noted.  The facial nerve is intact, with strong symmetric movements.  Voice and Breathing - The patient was breathing comfortably without the use of accessory muscles. There was no wheezing, stridor, or stertor.  The patients voice was clear and strong, and had appropriate pitch and quality.  Ears - The external auditory canals are patent, the tympanic membranes are intact without effusion, retraction or mass.  Bony landmarks are intact.  Eyes - Extraocular movements intact, and the pupils were reactive to light.  Sclera were not icteric or injected, conjunctiva were pink and moist.  Mouth - Examination of the oral cavity showed pink, healthy oral mucosa. No lesions or ulcerations noted.  The tongue was mobile and midline, and the dentition were in good condition.    Throat - The walls of the oropharynx were smooth, pink, moist, symmetric, and had no lesions or ulcerations.  The tonsillar pillars and soft palate were symmetric.  The uvula was midline on elevation.    Neck - Normal midline excursion of the laryngotracheal complex during swallowing.  Full range of motion on passive movement.  Palpation of the occipital, submental, submandibular, internal jugular chain, and supraclavicular nodes did not demonstrate any abnormal lymph nodes or masses.  Palpation of the thyroid was soft and smooth, with no nodules or goiter appreciated.  The trachea was mobile and midline.  Nose - External contour is symmetric, no gross deflection or scars.  Nasal mucosa is pink and moist with no abnormal mucus.    Heart- Regular rate  Lungs- Clear anterior and posterior., no wheeze.     Flexible Endoscopy -     The patient was counseled that their symptoms and history require a direct visualization with an endoscopy procedure.  They  understood and we proceeded with a fiberoptic examination.  First I sprayed both sides of the nose with a mixture of lidocaine and neosynephrine.  I then passed the scope through the nasal cavity.    The nasal cavity was unremarkable. Mild clear rhinorrhea throughout The nasopharynx was mucosally covered and symmetric.  The Eustachian tube openings were unobstructed.  Going further down I had a clear view of the base of tongue which had normal appearing lingual tonsillar tissue.  The base of tongue was free of lesions, and the vallecula was open.  The epiglottis was smooth and mucosally covered.  The supraglottic larynx was then clearly visualized.  The vocal cords moved smoothly and symmetrically, they were pearly white with small pinpoint VC nodules.  The pyriform sinuses were open, and the limited view of the postcricoid region did not show any lesions.      ASSESSMENT/ PLAN:    ICD-10-CM    1. Post-nasal drip  R09.82 lidocaine 2%-oxymetazoline 0.025% nasal solution 2 spray      2. Chronic cough  R05.3       3. Sensorineural hearing loss, asymmetrical  H90.3               Recommended HAC.   He will verify insurance coverage  HP  Annual audiogram.     He will continue with Claritin, for additional 3-4 weeks as he recently started this medication.   Start Randolph med rinses. Rinse 1-2 times as needed.     Maintain good laryngeal cares, water intake.   Follow reflux measures. He is Prilosec and reports no complaints    Follow up after the above, he will contact us regarding the results of AH.   Recheck larynx in 2-3 months  He does have small bilateral VC nodules  Consider ST    Irritable larynx briefly reviewed.     Minda Ugarte PA-C  ENT  [facility], [place]      Again, thank you for allowing me to participate in the care of your patient.        Sincerely,        Minda Ugarte PA-C    Electronically signed

## 2025-03-29 ENCOUNTER — HEALTH MAINTENANCE LETTER (OUTPATIENT)
Age: 75
End: 2025-03-29

## 2025-04-10 ENCOUNTER — OFFICE VISIT (OUTPATIENT)
Dept: FAMILY MEDICINE | Facility: OTHER | Age: 75
End: 2025-04-10
Attending: FAMILY MEDICINE
Payer: COMMERCIAL

## 2025-04-10 VITALS
RESPIRATION RATE: 14 BRPM | SYSTOLIC BLOOD PRESSURE: 130 MMHG | HEART RATE: 75 BPM | DIASTOLIC BLOOD PRESSURE: 82 MMHG | WEIGHT: 240.6 LBS | OXYGEN SATURATION: 96 % | HEIGHT: 68 IN | BODY MASS INDEX: 36.46 KG/M2 | TEMPERATURE: 98.3 F

## 2025-04-10 DIAGNOSIS — F31.11 BIPOLAR I DISORDER, MOST RECENT EPISODE (OR CURRENT) MANIC, MILD (H): ICD-10-CM

## 2025-04-10 DIAGNOSIS — R73.01 IFG (IMPAIRED FASTING GLUCOSE): ICD-10-CM

## 2025-04-10 DIAGNOSIS — F17.201 TOBACCO ABUSE, IN REMISSION: ICD-10-CM

## 2025-04-10 DIAGNOSIS — I45.10 RBBB: ICD-10-CM

## 2025-04-10 DIAGNOSIS — G47.33 OSA ON CPAP: ICD-10-CM

## 2025-04-10 DIAGNOSIS — I10 BENIGN ESSENTIAL HYPERTENSION: Primary | ICD-10-CM

## 2025-04-10 DIAGNOSIS — Z01.818 PREOP GENERAL PHYSICAL EXAM: ICD-10-CM

## 2025-04-10 LAB
ANION GAP SERPL CALCULATED.3IONS-SCNC: 11 MMOL/L (ref 7–15)
ATRIAL RATE - MUSE: 66 BPM
BUN SERPL-MCNC: 15.8 MG/DL (ref 8–23)
CALCIUM SERPL-MCNC: 9.9 MG/DL (ref 8.8–10.4)
CHLORIDE SERPL-SCNC: 103 MMOL/L (ref 98–107)
CHOLEST SERPL-MCNC: 164 MG/DL
CREAT SERPL-MCNC: 1.05 MG/DL (ref 0.67–1.17)
DIASTOLIC BLOOD PRESSURE - MUSE: NORMAL MMHG
EGFRCR SERPLBLD CKD-EPI 2021: 74 ML/MIN/1.73M2
EST. AVERAGE GLUCOSE BLD GHB EST-MCNC: 108 MG/DL
FASTING STATUS PATIENT QL REPORTED: NO
FASTING STATUS PATIENT QL REPORTED: NO
GLUCOSE SERPL-MCNC: 113 MG/DL (ref 70–99)
HBA1C MFR BLD: 5.4 %
HCO3 SERPL-SCNC: 27 MMOL/L (ref 22–29)
HDLC SERPL-MCNC: 46 MG/DL
INTERPRETATION ECG - MUSE: NORMAL
LDLC SERPL CALC-MCNC: 100 MG/DL
LITHIUM SERPL-SCNC: 0.39 MMOL/L (ref 0.6–1.2)
NONHDLC SERPL-MCNC: 118 MG/DL
P AXIS - MUSE: 17 DEGREES
POTASSIUM SERPL-SCNC: 4.5 MMOL/L (ref 3.4–5.3)
PR INTERVAL - MUSE: 156 MS
QRS DURATION - MUSE: 114 MS
QT - MUSE: 426 MS
QTC - MUSE: 446 MS
R AXIS - MUSE: 77 DEGREES
SODIUM SERPL-SCNC: 141 MMOL/L (ref 135–145)
SYSTOLIC BLOOD PRESSURE - MUSE: NORMAL MMHG
T AXIS - MUSE: 24 DEGREES
TRIGL SERPL-MCNC: 91 MG/DL
VENTRICULAR RATE- MUSE: 66 BPM

## 2025-04-10 PROCEDURE — 36415 COLL VENOUS BLD VENIPUNCTURE: CPT | Mod: ZL | Performed by: FAMILY MEDICINE

## 2025-04-10 PROCEDURE — 80061 LIPID PANEL: CPT | Mod: ZL | Performed by: FAMILY MEDICINE

## 2025-04-10 PROCEDURE — 93010 ELECTROCARDIOGRAM REPORT: CPT | Performed by: INTERNAL MEDICINE

## 2025-04-10 PROCEDURE — 3079F DIAST BP 80-89 MM HG: CPT | Performed by: FAMILY MEDICINE

## 2025-04-10 PROCEDURE — 80048 BASIC METABOLIC PNL TOTAL CA: CPT | Mod: ZL | Performed by: FAMILY MEDICINE

## 2025-04-10 PROCEDURE — G0463 HOSPITAL OUTPT CLINIC VISIT: HCPCS

## 2025-04-10 PROCEDURE — 1126F AMNT PAIN NOTED NONE PRSNT: CPT | Performed by: FAMILY MEDICINE

## 2025-04-10 PROCEDURE — 3075F SYST BP GE 130 - 139MM HG: CPT | Performed by: FAMILY MEDICINE

## 2025-04-10 PROCEDURE — 83036 HEMOGLOBIN GLYCOSYLATED A1C: CPT | Mod: ZL | Performed by: FAMILY MEDICINE

## 2025-04-10 PROCEDURE — 99214 OFFICE O/P EST MOD 30 MIN: CPT | Performed by: FAMILY MEDICINE

## 2025-04-10 PROCEDURE — 80178 ASSAY OF LITHIUM: CPT | Mod: ZL | Performed by: FAMILY MEDICINE

## 2025-04-10 PROCEDURE — 93005 ELECTROCARDIOGRAM TRACING: CPT | Performed by: FAMILY MEDICINE

## 2025-04-10 PROCEDURE — 82565 ASSAY OF CREATININE: CPT | Mod: ZL | Performed by: FAMILY MEDICINE

## 2025-04-10 ASSESSMENT — PAIN SCALES - GENERAL: PAINLEVEL_OUTOF10: NO PAIN (0)

## 2025-04-10 NOTE — PROGRESS NOTES
Preoperative Evaluation  [facility]  [address]  [place] [address]  Phone: [phone]  Fax: [phone]  Primary Provider: Dayana Noriega MD  Pre-op Performing Provider: Elio Ruth MD  Apr 10, 2025   {Provider  Link to PREOP SmartSet  REQUIRED to apply standard patient instructions and medication directions to the AVS :364951}  {ROOMER review and update patient entered surgical information if needed :665647}        4/10/2025   Surgical Information   What procedure is being done? pre op for colonastopy   Facility or Hospital where procedure/surgery will be performed: [facility]   Who is doing the procedure / surgery? ?   Date of surgery / procedure: 4/14/2025   Time of surgery / procedure: ?   Where do you plan to recover after surgery? at home with family     Fax number for surgical facility: {:[phone]}    {Provider Charting Preference for Preop :341792}    Will Colon is a 74 year old, presenting for the following:  Pre-Op Exam          4/10/2025     9:42 AM   Additional Questions   Roomed by Jaycee Rios LPN     HPI: ***          4/10/2025   Pre-Op Questionnaire   Have you ever had a heart attack or stroke? No   Have you ever had surgery on your heart or blood vessels, such as a stent placement, a coronary artery bypass, or surgery on an artery in your head, neck, heart, or legs? No   Do you have chest pain with activity? No   Do you have a history of heart failure? No   Do you currently have a cold, bronchitis or symptoms of other infection? No   Do you have a cough, shortness of breath, or wheezing? (!) YES ***   Do you or anyone in your family have previous history of blood clots? No   Do you or does anyone in your family have a serious bleeding problem such as prolonged bleeding following surgeries or cuts? No   Have you ever had problems with anemia or been told to take iron pills? No   Have you had any abnormal blood loss such as black,  tarry or bloody stools? No   Have you ever had a blood transfusion? No   Are you willing to have a blood transfusion if it is medically needed before, during, or after your surgery? Yes   Have you or any of your relatives ever had problems with anesthesia? No   Do you have sleep apnea, excessive snoring or daytime drowsiness? No   Do you have any artifical heart valves or other implanted medical devices like a pacemaker, defibrillator, or continuous glucose monitor? No   Do you have artificial joints? No   Are you allergic to latex? No     Health Care Directive  Patient does not have a Health Care Directive: {ADVANCE_DIRECTIVE_STATUS:408033}    Preoperative Review of   {Mnpmpreport:577275}  {Review MNPMP for all patients per [facility]CSI MNPMP Profile:348154}    {Chronic problem details (Optional) :731793}    Patient Active Problem List    Diagnosis Date Noted    Long-term current use of lithium 10/03/2024     Priority: Medium    GINA on CPAP 10/03/2024     Priority: Medium    Bipolar I disorder, most recent episode (or current) manic, mild (H) 10/03/2024     Priority: Medium    Recurrent major depressive disorder, in full remission 09/11/2017     Priority: Medium    Morbid obesity due to excess calories (H) 09/11/2017     Priority: Medium    Weight gain 07/19/2017     Priority: Medium    ACP (advance care planning) 09/21/2016     Priority: Medium     Advance Care Planning 9/21/2016: ACP Review of Chart / Resources Provided:  Reviewed chart for advance care plan.  Umer Archuleta has been provided information and resources to begin or update their advance care plan.  Added by Niyah Frost            Recurrent major depressive disorder, in remission 09/15/2016     Priority: Medium    Benign essential hypertension 09/15/2016     Priority: Medium    Umbilical hernia without obstruction and without gangrene 09/15/2016     Priority: Medium    ASNHL (asymmetrical sensorineural hearing loss) 10/07/2013     Priority:  Medium    Nocturia 08/14/2013     Priority: Medium    Tobacco abuse, in remission      Priority: Medium    ED (erectile dysfunction)      Priority: Medium    Bipolar I disorder, single manic episode (H) 10/23/2012     Priority: Medium     Problem list name updated by automated process. Provider to review      Advanced care planning/counseling discussion 10/23/2012     Priority: Medium      Past Medical History:   Diagnosis Date    Bipolar I disorder, single manic episode (H) 10/23/2012     Problem list name updated by automated process. Provider to review    ED (erectile dysfunction)     Essential hypertension with goal blood pressure less than 140/90 09/15/2016    Morbid obesity due to excess calories (H) 09/11/2017    GINA (obstructive sleep apnea)     on CPAP    Recurrent major depressive disorder, in remission 09/15/2016    Tobacco abuse, in remission     Umbilical hernia without obstruction and without gangrene 09/15/2016     No past surgical history on file.  Current Outpatient Medications   Medication Sig Dispense Refill    amLODIPine (NORVASC) 5 MG tablet Take 1 tablet (5 mg) by mouth daily. 90 tablet 3    aspirin EC 81 MG tablet Take 81 mg by mouth daily      bisacodyl (DULCOLAX) 5 MG EC tablet Take per Bowel Prep instructions included in the Colonoscopy Handbook 4 tablet 0    Cholecalciferol (VITAMIN D3) 5000 UNIT/ML LIQD Take 1 drop by mouth      DULoxetine (CYMBALTA) 60 MG capsule TAKE 1 CAPSULE DAILY Strength: 60 mg 90 capsule 3    ipratropium (ATROVENT) 0.03 % nasal spray Spray 2 sprays into both nostrils 3 times daily as needed for rhinitis. 30 mL 1    lithium 300 MG capsule Take 2 capsules (600 mg) by mouth daily 180 capsule 3    losartan-hydrochlorothiazide (HYZAAR) 50-12.5 MG tablet Take 1 tablet by mouth daily. 90 tablet 3    omeprazole (PRILOSEC) 20 MG DR capsule Take 1 capsule (20 mg) by mouth daily. 90 capsule 3    polyethylene glycol (GAVILYTE-G) 236 g suspension Take per Bowel Prep  "instructions included in the Colonoscopy Handbook 4000 mL 0    polyethylene glycol (GAVILYTE-G) 236 g suspension Take 4,000 mLs by mouth once as directed (Follow directions on the golytely hand out that was mailed to you.). 4000 mL 0    sildenafil (VIAGRA) 100 MG tablet Take 1 tablet (100 mg) by mouth daily as needed (as needed for erectile dysfunction) 10 tablet 0    sodium chloride (OCEAN) 0.65 % nasal spray Spray 2 sprays in nostril 4 times daily 88 mL 1    mineral oil-hydrophilic petrolatum (AQUAPHOR) external ointment Apply topically 4 times daily Apply gently to both nostrils following nasal saline use, 4 times daily and before bed. (Patient not taking: Reported on 2025) 100 g 11       No Known Allergies     Social History     Tobacco Use    Smoking status: Former     Passive exposure: Never    Smokeless tobacco: Never   Substance Use Topics    Alcohol use: Yes     Comment: rarely     {FAMILY HISTORY (Optional):859758275}  History   Drug Use Unknown           {ROS Picklists (Optional):215808}    Objective    /82 (BP Location: Right arm)   Pulse 75   Temp 98.3  F (36.8  C) (Tympanic)   Resp 14   Ht 1.727 m (5' 8\")   Wt 109.1 kg (240 lb 9.6 oz)   SpO2 96%   BMI 36.58 kg/m     Estimated body mass index is 36.58 kg/m  as calculated from the following:    Height as of this encounter: 1.727 m (5' 8\").    Weight as of this encounter: 109.1 kg (240 lb 9.6 oz).  Physical Exam  {Exam List :678842}    Recent Labs   Lab Test 25  1258 10/03/24  1013   HGB 14.4 14.0    278    142   POTASSIUM 3.3* 4.1   CR 0.94 1.00        Diagnostics  {LABS:465471}   {EK}    Revised Cardiac Risk Index (RCRI)  The patient has the following serious cardiovascular risks for perioperative complications:  {PREOP REVISED CARDIAC RISK INDEX (RCRI) :860155}     RCRI Interpretation: {REVISED CARDIAC RISK INTERPRETATION :707104}         Signed Electronically by: Elio Ruth MD  A copy of this " evaluation report is provided to the requesting physician.    {Provider Resources  [facility]  Revised Cardiac Risk Index :098506}   {Email feedback regarding this note to [email or web address]   :733701}   regular rate and rhythm, normal S1 S2, no S3 or S4, no murmur, click or rub, no peripheral edema  ABDOMEN: soft, nontender, no hepatosplenomegaly, no masses and bowel sounds normal  MS: no gross musculoskeletal defects noted, no edema  SKIN: no suspicious lesions or rashes  NEURO: Normal strength and tone, mentation intact and speech normal  PSYCH: mentation appears normal, affect normal/bright    Recent Labs   Lab Test 02/11/25  1258 10/03/24  1013   HGB 14.4 14.0    278    142   POTASSIUM 3.3* 4.1   CR 0.94 1.00        Diagnostics  Recent Results (from the past week)   EKG 12-lead complete w/read - Clinics    Collection Time: 04/10/25 10:36 AM   Result Value Ref Range    Systolic Blood Pressure  mmHg    Diastolic Blood Pressure  mmHg    Ventricular Rate 66 BPM    Atrial Rate 66 BPM    DC Interval 156 ms    QRS Duration 114 ms     ms    QTc 446 ms    P Axis 17 degrees    R AXIS 77 degrees    T Axis 24 degrees    Interpretation ECG       Sinus rhythm  Right bundle branch block  Possible Inferior infarct , age undetermined  Abnormal ECG  When compared with ECG of 11-Feb-2025 13:10,  QRS duration has decreased  Nonspecific anterior T-wave abnormality has improved.  Confirmed by MD ASHLEY, OPAL ([id number]) on 4/10/2025 4:10:55 PM     Lithium    Collection Time: 04/10/25 10:56 AM   Result Value Ref Range    Lithium 0.39 (L) 0.60 - 1.20 mmol/L   Lipid Panel    Collection Time: 04/10/25 10:56 AM   Result Value Ref Range    Cholesterol 164 <200 mg/dL    Triglycerides 91 <150 mg/dL    Direct Measure HDL 46 >=40 mg/dL    LDL Cholesterol Calculated 100 (H) <100 mg/dL    Non HDL Cholesterol 118 <130 mg/dL    Patient Fasting > 8hrs? No    Basic Metabolic Panel    Collection Time: 04/10/25 10:56 AM   Result Value Ref Range    Sodium 141 135 - 145 mmol/L    Potassium 4.5 3.4 - 5.3 mmol/L    Chloride 103 98 - 107 mmol/L    Carbon Dioxide (CO2) 27 22 - 29 mmol/L    Anion Gap 11 7 - 15 mmol/L    Urea Nitrogen 15.8  8.0 - 23.0 mg/dL    Creatinine 1.05 0.67 - 1.17 mg/dL    GFR Estimate 74 >60 mL/min/1.73m2    Calcium 9.9 8.8 - 10.4 mg/dL    Glucose 113 (H) 70 - 99 mg/dL    Patient Fasting > 8hrs? No    Hemoglobin A1c    Collection Time: 04/10/25 10:56 AM   Result Value Ref Range    Estimated Average Glucose 108 <117 mg/dL    Hemoglobin A1C 5.4 <5.7 %          Revised Cardiac Risk Index (RCRI)  The patient has the following serious cardiovascular risks for perioperative complications:   - No serious cardiac risks = 0 points     RCRI Interpretation: 0 points: Class I (very low risk - 0.4% complication rate)         Signed Electronically by: Elio Ruth MD  A copy of this evaluation report is provided to the requesting physician.

## 2025-04-10 NOTE — H&P (VIEW-ONLY)
Preoperative Evaluation  [facility] AND [facility]  [address]  [place] [address]  Phone: [phone]  Fax: [phone]  Primary Provider: Dayana Noriega MD  Pre-op Performing Provider: Elio Ruth MD  Apr 10, 2025             4/10/2025   Surgical Information   What procedure is being done? pre op for colonastopy   Facility or Hospital where procedure/surgery will be performed: [facility]   Who is doing the procedure / surgery? ?   Date of surgery / procedure: 4/14/2025   Time of surgery / procedure: ?   Where do you plan to recover after surgery? at home with family     Fax number for surgical facility:     Assessment & Plan     The proposed surgical procedure is considered LOW risk.    (I10) Benign essential hypertension  (primary encounter diagnosis)  Comment: Blood pressure is stable.  Continue current medications.  Plan: Lipid Panel, Basic Metabolic Panel    (F31.11) Bipolar I disorder, most recent episode (or current) manic, mild (H)  Comment: Lithium level is not elevated.  Continue current dose.  Plan: Lithium    (F17.201) Tobacco abuse, in remission  Comment: Continue to abstain from tobacco.    (G47.33) GINA on CPAP  Comment: Chronic and stable    (Z01.818) Preop general physical exam  Comment: Patient is able to tolerate greater than 4 METS of activity.  His EKG does not show any findings that would preclude him from this low risk surgery given his exercise tolerance and lack of cardiopulmonary symptoms.  He has not had any fevers or chills or other symptoms concerning for acute infection.    (R73.01) IFG (impaired fasting glucose)  Comment: Stable.  Plan: Hemoglobin A1c    (I45.10) RBBB  Comment: Previously noted  Plan: EKG 12-lead complete w/read - Clinics              - No identified additional risk factors other than previously addressed    Preoperative Medication Instructions  Antiplatelet or Anticoagulation Medication Instructions   - aspirin: Discontinue  aspirin 7 days prior to procedure to reduce bleeding risk. It should be resumed postoperatively.     Additional Medication Instructions  Take all scheduled medications on the day of surgery EXCEPT for modifications listed below:   - lithium: Check lithium level. Continue without modification.   -  HOLD fiber and follow the bowel prep recommendations    Recommendation  Approval given to proceed with proposed procedure, without further diagnostic evaluation.    Will Colon is a 74 year old, presenting for the following:  Pre-Op Exam          4/10/2025     9:42 AM   Additional Questions   Roomed by Jaycee Rios LPN     HPI: Patient presents for preoperative evaluation prior to undergoing colonoscopy.  He reports that he feels that he is in his usual state of health with no concerns.    Patient does remain active.  He reports he walks the dogs daily.  Typically will walk greater than half a mile including significant uphill walking at a fairly brisk speed.  This winter he shoveled snow without any issue.  At no time has he had any chest pain, shortness of breath, numbness or weakness of the arm or jaw, exertional indigestion or other symptoms concerning for angina.    He has not had any fevers, chills, sore throat.  He does report a chronic nonproductive cough for 10 years that he attributes to postnasal drip.  He has not had any recent abdominal pain, no dysuria or polyuria.  No black or bloody stool.              4/10/2025   Pre-Op Questionnaire   Have you ever had a heart attack or stroke? No   Have you ever had surgery on your heart or blood vessels, such as a stent placement, a coronary artery bypass, or surgery on an artery in your head, neck, heart, or legs? No   Do you have chest pain with activity? No   Do you have a history of heart failure? No   Do you currently have a cold, bronchitis or symptoms of other infection? No   Do you have a cough, shortness of breath, or wheezing? (!) YES -chronic  nonproductive cough secondary to postnasal drip   Do you or anyone in your family have previous history of blood clots? No   Do you or does anyone in your family have a serious bleeding problem such as prolonged bleeding following surgeries or cuts? No   Have you ever had problems with anemia or been told to take iron pills? No   Have you had any abnormal blood loss such as black, tarry or bloody stools? No   Have you ever had a blood transfusion? No   Are you willing to have a blood transfusion if it is medically needed before, during, or after your surgery? Yes   Have you or any of your relatives ever had problems with anesthesia? No   Do you have sleep apnea, excessive snoring or daytime drowsiness? No   Do you have any artifical heart valves or other implanted medical devices like a pacemaker, defibrillator, or continuous glucose monitor? No   Do you have artificial joints? No   Are you allergic to latex? No     Health Care Directive  Patient does not have a Health Care Directive: FULL CODE    Preoperative Review of    reviewed - no record of controlled substances prescribed.          Patient Active Problem List    Diagnosis Date Noted    Long-term current use of lithium 10/03/2024     Priority: Medium    GINA on CPAP 10/03/2024     Priority: Medium    Bipolar I disorder, most recent episode (or current) manic, mild (H) 10/03/2024     Priority: Medium    Recurrent major depressive disorder, in full remission 09/11/2017     Priority: Medium    Morbid obesity due to excess calories (H) 09/11/2017     Priority: Medium    Weight gain 07/19/2017     Priority: Medium    ACP (advance care planning) 09/21/2016     Priority: Medium     Advance Care Planning 9/21/2016: ACP Review of Chart / Resources Provided:  Reviewed chart for advance care plan.  Umer Archuleta has been provided information and resources to begin or update their advance care plan.  Added by Niyah Frost            Recurrent major  depressive disorder, in remission 09/15/2016     Priority: Medium    Benign essential hypertension 09/15/2016     Priority: Medium    Umbilical hernia without obstruction and without gangrene 09/15/2016     Priority: Medium    ASNHL (asymmetrical sensorineural hearing loss) 10/07/2013     Priority: Medium    Nocturia 08/14/2013     Priority: Medium    Tobacco abuse, in remission      Priority: Medium    ED (erectile dysfunction)      Priority: Medium    Bipolar I disorder, single manic episode (H) 10/23/2012     Priority: Medium     Problem list name updated by automated process. Provider to review      Advanced care planning/counseling discussion 10/23/2012     Priority: Medium      Past Medical History:   Diagnosis Date    Bipolar I disorder, single manic episode (H) 10/23/2012     Problem list name updated by automated process. Provider to review    ED (erectile dysfunction)     Essential hypertension with goal blood pressure less than 140/90 09/15/2016    Morbid obesity due to excess calories (H) 09/11/2017    GINA (obstructive sleep apnea)     on CPAP    Recurrent major depressive disorder, in remission 09/15/2016    Tobacco abuse, in remission     Umbilical hernia without obstruction and without gangrene 09/15/2016     No past surgical history on file.  Current Outpatient Medications   Medication Sig Dispense Refill    amLODIPine (NORVASC) 5 MG tablet Take 1 tablet (5 mg) by mouth daily. 90 tablet 3    aspirin EC 81 MG tablet Take 81 mg by mouth daily      bisacodyl (DULCOLAX) 5 MG EC tablet Take per Bowel Prep instructions included in the Colonoscopy Handbook 4 tablet 0    Cholecalciferol (VITAMIN D3) 5000 UNIT/ML LIQD Take 1 drop by mouth      DULoxetine (CYMBALTA) 60 MG capsule TAKE 1 CAPSULE DAILY Strength: 60 mg 90 capsule 3    ipratropium (ATROVENT) 0.03 % nasal spray Spray 2 sprays into both nostrils 3 times daily as needed for rhinitis. 30 mL 1    lithium 300 MG capsule Take 2 capsules (600 mg) by mouth  "daily 180 capsule 3    losartan-hydrochlorothiazide (HYZAAR) 50-12.5 MG tablet Take 1 tablet by mouth daily. 90 tablet 3    omeprazole (PRILOSEC) 20 MG DR capsule Take 1 capsule (20 mg) by mouth daily. 90 capsule 3    polyethylene glycol (GAVILYTE-G) 236 g suspension Take per Bowel Prep instructions included in the Colonoscopy Handbook 4000 mL 0    polyethylene glycol (GAVILYTE-G) 236 g suspension Take 4,000 mLs by mouth once as directed (Follow directions on the golytely hand out that was mailed to you.). 4000 mL 0    sildenafil (VIAGRA) 100 MG tablet Take 1 tablet (100 mg) by mouth daily as needed (as needed for erectile dysfunction) 10 tablet 0    sodium chloride (OCEAN) 0.65 % nasal spray Spray 2 sprays in nostril 4 times daily 88 mL 1    mineral oil-hydrophilic petrolatum (AQUAPHOR) external ointment Apply topically 4 times daily Apply gently to both nostrils following nasal saline use, 4 times daily and before bed. (Patient not taking: Reported on 2/14/2025) 100 g 11       No Known Allergies     Social History     Tobacco Use    Smoking status: Former     Passive exposure: Never    Smokeless tobacco: Never   Substance Use Topics    Alcohol use: Yes     Comment: rarely       History   Drug Use Unknown               Objective    /82 (BP Location: Right arm)   Pulse 75   Temp 98.3  F (36.8  C) (Tympanic)   Resp 14   Ht 1.727 m (5' 8\")   Wt 109.1 kg (240 lb 9.6 oz)   SpO2 96%   BMI 36.58 kg/m     Estimated body mass index is 36.58 kg/m  as calculated from the following:    Height as of this encounter: 1.727 m (5' 8\").    Weight as of this encounter: 109.1 kg (240 lb 9.6 oz).  Physical Exam  GENERAL: alert and no distress  EYES: Eyes grossly normal to inspection, PERRL and conjunctivae and sclerae normal  HENT: ear canals and TM's normal, nose and mouth without ulcers or lesions  NECK: no adenopathy, no asymmetry, masses, or scars  RESP: lungs clear to auscultation - no rales, rhonchi or wheezes  CV: " regular rate and rhythm, normal S1 S2, no S3 or S4, no murmur, click or rub, no peripheral edema  ABDOMEN: soft, nontender, no hepatosplenomegaly, no masses and bowel sounds normal  MS: no gross musculoskeletal defects noted, no edema  SKIN: no suspicious lesions or rashes  NEURO: Normal strength and tone, mentation intact and speech normal  PSYCH: mentation appears normal, affect normal/bright    Recent Labs   Lab Test 02/11/25  1258 10/03/24  1013   HGB 14.4 14.0    278    142   POTASSIUM 3.3* 4.1   CR 0.94 1.00        Diagnostics  Recent Results (from the past week)   EKG 12-lead complete w/read - Clinics    Collection Time: 04/10/25 10:36 AM   Result Value Ref Range    Systolic Blood Pressure  mmHg    Diastolic Blood Pressure  mmHg    Ventricular Rate 66 BPM    Atrial Rate 66 BPM    AZ Interval 156 ms    QRS Duration 114 ms     ms    QTc 446 ms    P Axis 17 degrees    R AXIS 77 degrees    T Axis 24 degrees    Interpretation ECG       Sinus rhythm  Right bundle branch block  Possible Inferior infarct , age undetermined  Abnormal ECG  When compared with ECG of 11-Feb-2025 13:10,  QRS duration has decreased  Nonspecific anterior T-wave abnormality has improved.  Confirmed by MD ASHLEY, OPAL ([id number]) on 4/10/2025 4:10:55 PM     Lithium    Collection Time: 04/10/25 10:56 AM   Result Value Ref Range    Lithium 0.39 (L) 0.60 - 1.20 mmol/L   Lipid Panel    Collection Time: 04/10/25 10:56 AM   Result Value Ref Range    Cholesterol 164 <200 mg/dL    Triglycerides 91 <150 mg/dL    Direct Measure HDL 46 >=40 mg/dL    LDL Cholesterol Calculated 100 (H) <100 mg/dL    Non HDL Cholesterol 118 <130 mg/dL    Patient Fasting > 8hrs? No    Basic Metabolic Panel    Collection Time: 04/10/25 10:56 AM   Result Value Ref Range    Sodium 141 135 - 145 mmol/L    Potassium 4.5 3.4 - 5.3 mmol/L    Chloride 103 98 - 107 mmol/L    Carbon Dioxide (CO2) 27 22 - 29 mmol/L    Anion Gap 11 7 - 15 mmol/L    Urea Nitrogen 15.8  8.0 - 23.0 mg/dL    Creatinine 1.05 0.67 - 1.17 mg/dL    GFR Estimate 74 >60 mL/min/1.73m2    Calcium 9.9 8.8 - 10.4 mg/dL    Glucose 113 (H) 70 - 99 mg/dL    Patient Fasting > 8hrs? No    Hemoglobin A1c    Collection Time: 04/10/25 10:56 AM   Result Value Ref Range    Estimated Average Glucose 108 <117 mg/dL    Hemoglobin A1C 5.4 <5.7 %          Revised Cardiac Risk Index (RCRI)  The patient has the following serious cardiovascular risks for perioperative complications:   - No serious cardiac risks = 0 points     RCRI Interpretation: 0 points: Class I (very low risk - 0.4% complication rate)         Signed Electronically by: Elio Ruth MD  A copy of this evaluation report is provided to the requesting physician.

## 2025-04-10 NOTE — PATIENT INSTRUCTIONS
How to Take Your Medication Before Surgery  Preoperative Medication Instructions   Antiplatelet or Anticoagulation Medication Instructions   - aspirin: Discontinue aspirin 7 days prior to procedure to reduce bleeding risk. It should be resumed postoperatively.     Additional Medication Instructions  Take all scheduled medications on the day of surgery EXCEPT for modifications listed below:   - lithium: Check lithium level. Continue without modification.   -  HOLD fiber and follow the bowel prep recommendations       Patient Education   Preparing for Your Surgery  For Adults  Getting started  In most cases, a nurse will call to review your health history and instructions. They will give you an arrival time based on your scheduled surgery time. Please be ready to share:  Your doctor's clinic name and phone number  Your medical, surgical, and anesthesia history  A list of allergies and sensitivities  A list of medicines, including herbal treatments and over-the-counter drugs  Whether the patient has a legal guardian (ask how to send us the papers in advance)  Note: You may not receive a call if you were seen at our PAC (Preoperative Assessment Center).  Please tell us if you're pregnant--or if there's any chance you might be pregnant. Some surgeries may injure a fetus (unborn baby), so they require a pregnancy test. Surgeries that are safe for a fetus don't always need a test, and you can choose whether to have one.   Preparing for surgery  Within 10 to 30 days of surgery: Have a pre-op exam (sometimes called an H&P, or History and Physical). This can be done at a clinic or pre-operative center.  If you're having a , you may not need this exam. Talk to your care team.  At your pre-op exam, talk to your care team about all medicines you take. (This includes CBD oil and any drugs, such as THC, marijuana, and other forms of cannabis.) If you need to stop any medicine before surgery, ask when to start taking it  again.  This is for your safety. Many medicines and drugs can make you bleed too much during surgery. Some change how well surgery (anesthesia) drugs work.  Call your insurance company to let them know you're having surgery. (If you don't have insurance, call [phone].)  Call your clinic if there's any change in your health. This includes a scrape or scratch near the surgery site, or any signs of a cold (sore throat, runny nose, cough, rash, fever).  Eating and drinking guidelines  For your safety: Unless your surgeon tells you otherwise, follow the guidelines below.  Eat and drink as normal until 8 hours before you arrive for surgery. After that, no food or milk. You can spit out gum when you arrive.  Drink clear liquids until 2 hours before you arrive. These are liquids you can see through, like water, Gatorade, and Propel Water. They also include plain black coffee and tea (no cream or milk).  No alcohol for 24 hours before you arrive. The night before surgery, stop any drinks that contain THC.  If your care team tells you to take medicine on the morning of surgery, it's okay to take it with a sip of water. No other medicines or drugs are allowed (including CBD oil)--follow your care team's instructions.  If you have questions the day of surgery, call your hospital or surgery center.   Preventing infection  Shower or bathe the night before and the morning of surgery. Follow the instructions your clinic gave you. (If no instructions, use regular soap.)  Don't shave or clip hair near your surgery site. We'll remove the hair if needed.  Don't smoke or vape the morning of surgery. No chewing tobacco for 6 hours before you arrive. A nicotine patch is okay. You may spit out nicotine gum when you arrive.  For some surgeries, the surgeon will tell you to fully quit smoking and nicotine.  We will make every effort to keep you safe from infection. We will:  Clean our hands often with soap and water (or an alcohol-based  hand rub).  Clean the skin at your surgery site with a special soap that kills germs.  Give you a special gown to keep you warm. (Cold raises the risk of infection.)  Wear hair covers, masks, gowns, and gloves during surgery.  Give antibiotic medicine, if prescribed. Not all surgeries need this medicine.  What to bring on the day of surgery  Photo ID and insurance card  Copy of your health care directive, if you have one  Glasses and hearing aids (bring cases)  You can't wear contacts during surgery  Inhaler and eye drops, if you use them (tell us about these when you arrive)  CPAP machine or breathing device, if you use them  A few personal items, if spending the night  If you have . . .  A pacemaker, ICD (cardiac defibrillator), or other implant: Bring the ID card.  An implanted stimulator: Bring the remote control.  A legal guardian: Bring a copy of the certified (court-stamped) guardianship papers.  Please remove any jewelry, including body piercings. Leave jewelry and other valuables at home.  If you're going home the day of surgery  You must have a responsible adult drive you home. They should stay with you overnight as well.  If you don't have someone to stay with you, and you aren't safe to go home alone, we may keep you overnight. Insurance often won't pay for this.  After surgery  If it's hard to control your pain or you need more pain medicine, please call your surgeon's office.  Questions?   If you have any questions for your care team, list them here:   ____________________________________________________________________________________________________________________________________________________________________________________________________________________________________________________________  For informational purposes only. Not to replace the advice of your health care provider. Copyright   2003, 2019 [facility]. All rights reserved. Clinically reviewed by Charlie Nelson MD.  [employer] [id number] - REV 08/24.

## 2025-04-14 ENCOUNTER — HOSPITAL ENCOUNTER (OUTPATIENT)
Facility: HOSPITAL | Age: 75
Discharge: HOME | End: 2025-04-14
Attending: SURGERY | Admitting: SURGERY
Payer: COMMERCIAL

## 2025-04-14 ENCOUNTER — TELEPHONE (OUTPATIENT)
Dept: FAMILY MEDICINE | Facility: OTHER | Age: 75
End: 2025-04-14
Payer: COMMERCIAL

## 2025-04-14 VITALS
HEIGHT: 68 IN | RESPIRATION RATE: 16 BRPM | OXYGEN SATURATION: 95 % | DIASTOLIC BLOOD PRESSURE: 95 MMHG | SYSTOLIC BLOOD PRESSURE: 138 MMHG | BODY MASS INDEX: 36.37 KG/M2 | WEIGHT: 240 LBS | TEMPERATURE: 98 F | HEART RATE: 66 BPM

## 2025-04-14 PROCEDURE — 360N000075 HC SURGERY LEVEL 2, PER MIN: Performed by: SURGERY

## 2025-04-14 PROCEDURE — 999N000141 HC STATISTIC PRE-PROCEDURE NURSING ASSESSMENT: Performed by: SURGERY

## 2025-04-14 PROCEDURE — 250N000011 HC RX IP 250 OP 636: Performed by: NURSE ANESTHETIST, CERTIFIED REGISTERED

## 2025-04-14 PROCEDURE — 258N000003 HC RX IP 258 OP 636: Performed by: NURSE PRACTITIONER

## 2025-04-14 PROCEDURE — 370N000017 HC ANESTHESIA TECHNICAL FEE, PER MIN: Performed by: SURGERY

## 2025-04-14 PROCEDURE — G0121 COLON CA SCRN NOT HI RSK IND: HCPCS | Mod: KX | Performed by: SURGERY

## 2025-04-14 PROCEDURE — 250N000009 HC RX 250: Performed by: NURSE ANESTHETIST, CERTIFIED REGISTERED

## 2025-04-14 PROCEDURE — 710N000012 HC RECOVERY PHASE 2, PER MINUTE: Performed by: SURGERY

## 2025-04-14 PROCEDURE — 272N000001 HC OR GENERAL SUPPLY STERILE: Performed by: SURGERY

## 2025-04-14 RX ORDER — NALOXONE HYDROCHLORIDE 0.4 MG/ML
0.1 INJECTION, SOLUTION INTRAMUSCULAR; INTRAVENOUS; SUBCUTANEOUS
Status: DISCONTINUED | OUTPATIENT
Start: 2025-04-14 | End: 2025-04-14 | Stop reason: HOSPADM

## 2025-04-14 RX ORDER — ONDANSETRON 4 MG/1
4 TABLET, ORALLY DISINTEGRATING ORAL EVERY 30 MIN PRN
Status: DISCONTINUED | OUTPATIENT
Start: 2025-04-14 | End: 2025-04-14 | Stop reason: HOSPADM

## 2025-04-14 RX ORDER — LIDOCAINE 40 MG/G
CREAM TOPICAL
Status: DISCONTINUED | OUTPATIENT
Start: 2025-04-14 | End: 2025-04-14 | Stop reason: HOSPADM

## 2025-04-14 RX ORDER — SODIUM CHLORIDE, SODIUM LACTATE, POTASSIUM CHLORIDE, CALCIUM CHLORIDE 600; 310; 30; 20 MG/100ML; MG/100ML; MG/100ML; MG/100ML
INJECTION, SOLUTION INTRAVENOUS CONTINUOUS
Status: DISCONTINUED | OUTPATIENT
Start: 2025-04-14 | End: 2025-04-14 | Stop reason: HOSPADM

## 2025-04-14 RX ORDER — PROPOFOL 10 MG/ML
INJECTION, EMULSION INTRAVENOUS PRN
Status: DISCONTINUED | OUTPATIENT
Start: 2025-04-14 | End: 2025-04-14

## 2025-04-14 RX ORDER — LIDOCAINE HYDROCHLORIDE 20 MG/ML
INJECTION, SOLUTION INFILTRATION; PERINEURAL PRN
Status: DISCONTINUED | OUTPATIENT
Start: 2025-04-14 | End: 2025-04-14

## 2025-04-14 RX ORDER — DEXAMETHASONE SODIUM PHOSPHATE 10 MG/ML
4 INJECTION, SOLUTION INTRAMUSCULAR; INTRAVENOUS
Status: DISCONTINUED | OUTPATIENT
Start: 2025-04-14 | End: 2025-04-14 | Stop reason: HOSPADM

## 2025-04-14 RX ORDER — PROPOFOL 10 MG/ML
INJECTION, EMULSION INTRAVENOUS CONTINUOUS PRN
Status: DISCONTINUED | OUTPATIENT
Start: 2025-04-14 | End: 2025-04-14

## 2025-04-14 RX ORDER — ONDANSETRON 2 MG/ML
4 INJECTION INTRAMUSCULAR; INTRAVENOUS EVERY 30 MIN PRN
Status: DISCONTINUED | OUTPATIENT
Start: 2025-04-14 | End: 2025-04-14 | Stop reason: HOSPADM

## 2025-04-14 RX ADMIN — PROPOFOL 50 MG: 10 INJECTION, EMULSION INTRAVENOUS at 10:41

## 2025-04-14 RX ADMIN — PROPOFOL 125 MCG/KG/MIN: 10 INJECTION, EMULSION INTRAVENOUS at 10:41

## 2025-04-14 RX ADMIN — LIDOCAINE HYDROCHLORIDE 100 MG: 20 INJECTION, SOLUTION INFILTRATION; PERINEURAL at 10:41

## 2025-04-14 RX ADMIN — SODIUM CHLORIDE, SODIUM LACTATE, POTASSIUM CHLORIDE, AND CALCIUM CHLORIDE: .6; .31; .03; .02 INJECTION, SOLUTION INTRAVENOUS at 10:03

## 2025-04-14 ASSESSMENT — LIFESTYLE VARIABLES: TOBACCO_USE: 1

## 2025-04-14 ASSESSMENT — ACTIVITIES OF DAILY LIVING (ADL)
ADLS_ACUITY_SCORE: 41

## 2025-04-14 NOTE — ANESTHESIA POSTPROCEDURE EVALUATION
Patient: Umer Archuleta    Procedure: Procedure(s):  COLONOSCOPY       Anesthesia Type:  MAC    Note:  Disposition: Outpatient   Postop Pain Control: Uneventful            Sign Out: Well controlled pain   PONV: No   Neuro/Psych: Uneventful            Sign Out: Acceptable/Baseline neuro status   Airway/Respiratory: Uneventful            Sign Out: Acceptable/Baseline resp. status   CV/Hemodynamics: Uneventful            Sign Out: Acceptable CV status; No obvious hypovolemia; No obvious fluid overload   Other NRE: NONE   DID A NON-ROUTINE EVENT OCCUR? No           Last vitals:  Vitals Value Taken Time   /95 04/14/25 1125   Temp 98  F (36.7  C) 04/14/25 1125   Pulse 66 04/14/25 1125   Resp 16 04/14/25 1125   SpO2 95 % 04/14/25 1125       Electronically Signed By: ASHWINI ENCARNACION CRNA  April 14, 2025  11:45 AM

## 2025-04-14 NOTE — ANESTHESIA POSTPROCEDURE EVALUATION
Patient: Umer Archuleta    Procedure: Procedure(s):  COLONOSCOPY       Anesthesia Type:  MAC    Note:  Disposition: Outpatient   Postop Pain Control: Uneventful            Sign Out: Well controlled pain   PONV: No   Neuro/Psych: Uneventful            Sign Out: Acceptable/Baseline neuro status   Airway/Respiratory: Uneventful            Sign Out: Acceptable/Baseline resp. status   CV/Hemodynamics: Uneventful            Sign Out: Acceptable CV status; No obvious hypovolemia; No obvious fluid overload   Other NRE: NONE   DID A NON-ROUTINE EVENT OCCUR? No       Last vitals:  Vitals Value Taken Time   /95 04/14/25 1125   Temp 98  F (36.7  C) 04/14/25 1125   Pulse 66 04/14/25 1125   Resp 16 04/14/25 1125   SpO2 95 % 04/14/25 1125       Electronically Signed By: ASHWINI Fernandez CRNA  April 14, 2025  11:47 AM

## 2025-04-14 NOTE — TELEPHONE ENCOUNTER
Spoke with Dr. Ruth. Attempted to call mellisa Owusu to get ahold of patient. Called [facility] to let them know note will be completed per Dr. Ruth. Spoke with Jessenia in surgery department. She is going to let provider and nursing know.     Hilda Villela RN.......4/14/20259:19 AM

## 2025-04-14 NOTE — ANESTHESIA CARE TRANSFER NOTE
Patient: Umer Archuleta    Procedure: Procedure(s):  COLONOSCOPY       Diagnosis: Encounter for screening colonoscopy [Z12.11]  Diagnosis Additional Information: No value filed.    Anesthesia Type:   MAC     Note:    Oropharynx: oropharynx clear of all foreign objects and spontaneously breathing  Level of Consciousness: awake  Oxygen Supplementation: room air    Independent Airway: airway patency satisfactory and stable  Dentition: dentition unchanged  Vital Signs Stable: post-procedure vital signs reviewed and stable  Report to RN Given: handoff report given  Patient transferred to: Phase II    Handoff Report: Identifed the Patient, Identified the Reponsible Provider, Reviewed the pertinent medical history, Discussed the surgical course, Reviewed Intra-OP anesthesia mangement and issues during anesthesia, Set expectations for post-procedure period and Allowed opportunity for questions and acknowledgement of understanding  Vitals:  Vitals Value Taken Time   BP     Temp     Pulse     Resp     SpO2         Electronically Signed By: ASHWINI Fernandez CRNA  April 14, 2025  11:08 AM

## 2025-04-14 NOTE — PLAN OF CARE
Patient and responsible adult given discharge instructions with no questions regarding instructions. Rosa score 19. Pain level 0/10.  Discharged from unit via ambulating. Patient discharged to home  .

## 2025-04-14 NOTE — OP NOTE
REPORT OF OPERATION  DATE OF PROCEDURE: 4/14/2025    PATIENT: Umer Archuleta    SURGERY PERFORMED: Colonoscopy    PREOPERATIVE DIAGNOSIS:  Positive cologuard    POSTOPERATIVE DIAGNOSIS:    Same   Normal colonoscopy   Diverticulosis was identified.   Hemorrhoids were identified.    SURGEON: Abel Basilio MD    ASSISTANTS: None    ANESTHESIA: Monitored Anesthesia Care    COMPLICATIONS: None apparent    ESTIMATED BLOOD LOSS: * No values recorded between 4/14/2025 10:44 AM and 4/14/2025 11:02 AM *    TRANSFUSIONS: None    TISSUE TO PATHOLOGY: None    FINDINGS: Normal colonoscopy.  Diverticulosis was identified.  Hemorrhoids were identified.    INDICATIONS: This is a 74 year old male in need of a colonoscopy for positive cologuard.  The patient will be taken to the endoscopy suite for that procedure.    DESCRIPTIONS OF PROCEDURE IN DETAIL: After consent was obtained the patient was taken to the endoscopy suite and placed in the left lateral decubitus position.  The patient was identified and the correct patient was confirmed.  Monitored Anesthesia Care was given.  A time out was performed verifying the correct patient and the correct procedure.  The entire operative team was in agreement.  All necessary equipment and supplies were in the room.    Rectal exam was performed and no lesions of the anal canal were noted.  The colonoscope was inserted into the anus and passed without difficulty to the cecum.  The cecum was identified by the ileocecal valve, the coalescence of the tinea and the appendiceal orifice.  Upon withdrawal all walls of the colon were visualized.  There were no polyps, masses or evidence of colitis seen.  Diverticulosis was not seen.  Upon reaching the rectum the scope was retroflexed and internal hemorrhoids  were  seen.  The scope was straightened back out and removed from the patient.  The patient was then taken to the recovery room in stable condition tolerating the procedure well.       Prep: good    Withdrawal time was 10 minutes.    It is recommended that the patient have another colonoscopy in 10 years, however, it would be reasonable to stop screening colonoscopies for patient given his age.

## 2025-04-14 NOTE — ANESTHESIA POSTPROCEDURE EVALUATION
Patient: Umer Archuleta    Procedure: Procedure(s):  COLONOSCOPY       Anesthesia Type:  MAC    Note:  Disposition: Outpatient   Postop Pain Control: Uneventful            Sign Out: Well controlled pain   PONV: No   Neuro/Psych: Uneventful            Sign Out: Acceptable/Baseline neuro status   Airway/Respiratory: Uneventful            Sign Out: Acceptable/Baseline resp. status   CV/Hemodynamics: Uneventful            Sign Out: Acceptable CV status; No obvious hypovolemia; No obvious fluid overload   Other NRE: NONE   DID A NON-ROUTINE EVENT OCCUR? No       Last vitals:  Vitals Value Taken Time   /95 04/14/25 1125   Temp 98  F (36.7  C) 04/14/25 1125   Pulse 66 04/14/25 1125   Resp 16 04/14/25 1125   SpO2 95 % 04/14/25 1125       Electronically Signed By: ASHWINI Cobian CRNA  April 14, 2025  11:38 AM

## 2025-04-15 NOTE — INTERVAL H&P NOTE
"I have reviewed the surgical (or preoperative) H&P that is linked to this encounter, and examined the patient. There are no significant changes    Clinical Conditions Present on Arrival:  Clinically Significant Risk Factors Present on Admission                  # Drug Induced Platelet Defect: home medication list includes an antiplatelet medication      # Obesity: Estimated body mass index is 36.49 kg/m  as calculated from the following:    Height as of this encounter: 1.727 m (5' 8\").    Weight as of this encounter: 108.9 kg (240 lb).       "

## 2025-04-21 DIAGNOSIS — R05.8 ACE-INHIBITOR COUGH: ICD-10-CM

## 2025-04-21 DIAGNOSIS — T46.4X5A ACE-INHIBITOR COUGH: ICD-10-CM

## 2025-04-21 DIAGNOSIS — I10 ESSENTIAL HYPERTENSION WITH GOAL BLOOD PRESSURE LESS THAN 140/90: ICD-10-CM

## 2025-04-21 DIAGNOSIS — F31.11 BIPOLAR I DISORDER, MOST RECENT EPISODE (OR CURRENT) MANIC, MILD (H): ICD-10-CM

## 2025-04-21 DIAGNOSIS — F43.21 ADJUSTMENT DISORDER WITH DEPRESSED MOOD: ICD-10-CM

## 2025-04-21 RX ORDER — OMEPRAZOLE 20 MG/1
20 CAPSULE, DELAYED RELEASE ORAL DAILY
Qty: 90 CAPSULE | Refills: 3 | OUTPATIENT
Start: 2025-04-21

## 2025-04-21 RX ORDER — AMLODIPINE BESYLATE 5 MG/1
5 TABLET ORAL DAILY
Qty: 90 TABLET | Refills: 3 | OUTPATIENT
Start: 2025-04-21

## 2025-04-21 RX ORDER — LOSARTAN POTASSIUM AND HYDROCHLOROTHIAZIDE 12.5; 5 MG/1; MG/1
1 TABLET ORAL DAILY
Qty: 90 TABLET | Refills: 3 | OUTPATIENT
Start: 2025-04-21

## 2025-04-21 NOTE — TELEPHONE ENCOUNTER
[facility], [place] sent Rx request for the following:    Redundant refill request refused: Too soon:  omeprazole (PRILOSEC) 20 MG DR capsule 90 capsule 3 9/12/2024 -- No   Sig - Route: Take 1 capsule (20 mg) by mouth daily. - Oral   Sent to pharmacy as: Omeprazole 20 MG Oral Capsule Delayed Release (PriLOSEC)   Class: E-Prescribe   Order: [id number]   E-Prescribing Status: Receipt confirmed by pharmacy (9/12/2024 12:27 PM CDT)     amLODIPine (NORVASC) 5 MG tablet 90 tablet 3 9/12/2024 -- No   Sig - Route: Take 1 tablet (5 mg) by mouth daily. - Oral   Sent to pharmacy as: amLODIPine Besylate 5 MG Oral Tablet (NORVASC)   Class: E-Prescribe   Order: [id number]   E-Prescribing Status: Receipt confirmed by pharmacy (9/12/2024 12:26 PM CDT)     losartan-hydrochlorothiazide (HYZAAR) 50-12.5 MG tablet 90 tablet 3 9/12/2024 -- No   Sig - Route: Take 1 tablet by mouth daily. - Oral   Sent to pharmacy as: Losartan Potassium-HCTZ 50-12.5 MG Oral Tablet (HYZAAR)   Class: E-Prescribe   Order: [id number]   E-Prescribing Status: Receipt confirmed by pharmacy (9/12/2024 12:26 PM CDT)     [facility] - [place], [place] - [address] [place] [address]     Requested Prescriptions   Pending Prescriptions Disp Refills    lithium 300 MG capsule 180 capsule 3     Sig: Take 2 capsules (600 mg) by mouth daily.   Last Prescription Date:   4/3/24  Last Fill Qty/Refills:        180, R-3      There is no refill protocol information for this order      DULoxetine (CYMBALTA) 60 MG capsule 90 capsule 3     Sig: TAKE 1 CAPSULE DAILY Strength: 60 mg   Last Prescription Date:   4/11/24  Last Fill Qty/Refills:         90, R-3      Serotonin-Norepinephrine Reuptake Inhibitors  Failed - 4/21/2025  4:19 PM        Failed - Most recent blood pressure under 140/90 in past 12 months     BP Readings from Last 3 Encounters:   04/14/25 (!) 138/95   04/10/25 130/82   03/11/25 (!) 142/88   No data recorded     Last Office Visit:               4/10/25 (Preop)   Future Office visit:           None    Unable to complete prescription refill per RN Medication Refill Policy. Cynthia Dang, Refill RN .............. 4/21/2025  4:22 PM

## 2025-04-23 RX ORDER — DULOXETIN HYDROCHLORIDE 60 MG/1
CAPSULE, DELAYED RELEASE ORAL
Qty: 90 CAPSULE | Refills: 3 | Status: SHIPPED | OUTPATIENT
Start: 2025-04-23

## 2025-04-23 RX ORDER — LITHIUM CARBONATE 300 MG/1
600 CAPSULE ORAL DAILY
Qty: 180 CAPSULE | Refills: 3 | Status: SHIPPED | OUTPATIENT
Start: 2025-04-23

## 2025-05-15 DIAGNOSIS — F43.21 ADJUSTMENT DISORDER WITH DEPRESSED MOOD: ICD-10-CM

## 2025-05-15 DIAGNOSIS — I10 ESSENTIAL HYPERTENSION WITH GOAL BLOOD PRESSURE LESS THAN 140/90: ICD-10-CM

## 2025-05-15 DIAGNOSIS — T46.4X5A ACE-INHIBITOR COUGH: ICD-10-CM

## 2025-05-15 DIAGNOSIS — R05.8 ACE-INHIBITOR COUGH: ICD-10-CM

## 2025-05-15 RX ORDER — AMLODIPINE BESYLATE 5 MG/1
5 TABLET ORAL DAILY
Qty: 90 TABLET | Refills: 3 | Status: SHIPPED | OUTPATIENT
Start: 2025-05-15

## 2025-05-15 RX ORDER — OMEPRAZOLE 20 MG/1
20 CAPSULE, DELAYED RELEASE ORAL DAILY
Qty: 90 CAPSULE | Refills: 3 | Status: SHIPPED | OUTPATIENT
Start: 2025-05-15

## 2025-05-15 RX ORDER — LOSARTAN POTASSIUM AND HYDROCHLOROTHIAZIDE 12.5; 5 MG/1; MG/1
1 TABLET ORAL DAILY
Qty: 90 TABLET | Refills: 3 | Status: SHIPPED | OUTPATIENT
Start: 2025-05-15

## 2025-05-15 NOTE — TELEPHONE ENCOUNTER
[facility] sent Rx request for the following:   Because patient's former PCP passed away, the pharmacy is requesting new scripts for the follow meds from his current PCP.     Requested Prescriptions   Pending Prescriptions Disp Refills    amLODIPine (NORVASC) 5 MG tablet 90 tablet 3     Sig: Take 1 tablet (5 mg) by mouth daily.       Calcium Channel Blockers Protocol  Failed - 5/15/2025 11:40 AM        Failed - Most recent blood pressure under 140/90 in past 12 months     BP Readings from Last 3 Encounters:   04/14/25 (!) 138/95   04/10/25 130/82   03/11/25 (!) 142/88       No data recorded         Last Prescription Date:   09/12/24  Last Fill Qty/Refills:         90, R-3    Last Office Visit:              04/10/25 ([facility] - preop)        omeprazole (PRILOSEC) 20 MG DR capsule 90 capsule 3     Sig: Take 1 capsule (20 mg) by mouth daily.       PPI Protocol Passed - 5/15/2025 11:40 AM     Last Prescription Date:   09/12/24  Last Fill Qty/Refills:         90, R-3    Last Office Visit:              04/10/25 ([facility] - preop)        losartan-hydrochlorothiazide (HYZAAR) 50-12.5 MG tablet 90 tablet 3     Sig: Take 1 tablet by mouth daily.       Angiotensin-II Receptors Failed - 5/15/2025 11:40 AM        Failed - Most recent blood pressure under 140/90 in past 12 months     BP Readings from Last 3 Encounters:   04/14/25 (!) 138/95   04/10/25 130/82   03/11/25 (!) 142/88       No data recorded           Diuretics (Including Combos) Protocol Failed - 5/15/2025 11:40 AM        Failed - Most recent blood pressure under 140/90 in past 12 months     BP Readings from Last 3 Encounters:   04/14/25 (!) 138/95   04/10/25 130/82   03/11/25 (!) 142/88       No data recorded         Last Prescription Date:   09/12/24  Last Fill Qty/Refills:         90, R-3    Last Office Visit:              04/10/25  ([facility] - preop)  Future Office visit:            None.     Unable to complete prescription refill per RN Medication Refill Policy.  Patient had refills on file previously but because previous PCP passed away, pharmacy is requesting new scripts from new PCP. Will route to Dr. Rashid Noriega for consideration.  Shivani Al RN on 5/15/2025 at 12:20 PM

## 2025-05-15 NOTE — TELEPHONE ENCOUNTER
Pharmacy called to see if they can have the following prescriptions filled as he had refills, but patient's old provider passed away and the insurance company needs new prescriptions sent by the patient's new PCP which is Rashid Noriega. They need prescriptions for Omeprazole 20mg caps Amlodipine 5 MG tabs and Losartan HCTZ 50/12.5 MG tabs sent to [facility].    Roselia Hernadez on 5/15/2025 at 11:14 AM

## 2025-07-12 ENCOUNTER — HEALTH MAINTENANCE LETTER (OUTPATIENT)
Age: 75
End: 2025-07-12

## 2025-07-16 ENCOUNTER — OFFICE VISIT (OUTPATIENT)
Dept: FAMILY MEDICINE | Facility: OTHER | Age: 75
End: 2025-07-16
Attending: STUDENT IN AN ORGANIZED HEALTH CARE EDUCATION/TRAINING PROGRAM
Payer: COMMERCIAL

## 2025-07-16 ENCOUNTER — RESULTS FOLLOW-UP (OUTPATIENT)
Dept: FAMILY MEDICINE | Facility: OTHER | Age: 75
End: 2025-07-16

## 2025-07-16 VITALS
HEART RATE: 72 BPM | DIASTOLIC BLOOD PRESSURE: 86 MMHG | RESPIRATION RATE: 16 BRPM | SYSTOLIC BLOOD PRESSURE: 138 MMHG | TEMPERATURE: 98.2 F | WEIGHT: 236 LBS | BODY MASS INDEX: 35.77 KG/M2 | HEIGHT: 68 IN | OXYGEN SATURATION: 95 %

## 2025-07-16 DIAGNOSIS — R06.09 OTHER FORMS OF DYSPNEA: ICD-10-CM

## 2025-07-16 DIAGNOSIS — R53.83 OTHER FATIGUE: Primary | ICD-10-CM

## 2025-07-16 LAB
ALBUMIN SERPL BCG-MCNC: 4.5 G/DL (ref 3.5–5.2)
ALBUMIN UR-MCNC: NEGATIVE MG/DL
ALP SERPL-CCNC: 70 U/L (ref 40–150)
ALT SERPL W P-5'-P-CCNC: 119 U/L (ref 0–70)
ANION GAP SERPL CALCULATED.3IONS-SCNC: 10 MMOL/L (ref 7–15)
APPEARANCE UR: CLEAR
AST SERPL W P-5'-P-CCNC: 61 U/L (ref 0–45)
BASOPHILS # BLD AUTO: 0 10E3/UL (ref 0–0.2)
BASOPHILS NFR BLD AUTO: 0 %
BILIRUB SERPL-MCNC: 0.6 MG/DL
BILIRUB UR QL STRIP: NEGATIVE
BUN SERPL-MCNC: 14.1 MG/DL (ref 8–23)
CALCIUM SERPL-MCNC: 9.6 MG/DL (ref 8.8–10.4)
CHLORIDE SERPL-SCNC: 103 MMOL/L (ref 98–107)
COLOR UR AUTO: NORMAL
CREAT SERPL-MCNC: 1.01 MG/DL (ref 0.67–1.17)
EGFRCR SERPLBLD CKD-EPI 2021: 78 ML/MIN/1.73M2
EOSINOPHIL # BLD AUTO: 0.4 10E3/UL (ref 0–0.7)
EOSINOPHIL NFR BLD AUTO: 6 %
ERYTHROCYTE [DISTWIDTH] IN BLOOD BY AUTOMATED COUNT: 14 % (ref 10–15)
GLUCOSE SERPL-MCNC: 106 MG/DL (ref 70–99)
GLUCOSE UR STRIP-MCNC: NEGATIVE MG/DL
HCO3 SERPL-SCNC: 27 MMOL/L (ref 22–29)
HCT VFR BLD AUTO: 44.6 % (ref 40–53)
HGB BLD-MCNC: 15.2 G/DL (ref 13.3–17.7)
HGB UR QL STRIP: NEGATIVE
IMM GRANULOCYTES # BLD: 0 10E3/UL
IMM GRANULOCYTES NFR BLD: 0 %
IRON BINDING CAPACITY (ROCHE): 348 UG/DL (ref 240–430)
IRON SATN MFR SERPL: 28 % (ref 15–46)
IRON SERPL-MCNC: 97 UG/DL (ref 61–157)
KETONES UR STRIP-MCNC: NEGATIVE MG/DL
LEUKOCYTE ESTERASE UR QL STRIP: NEGATIVE
LITHIUM SERPL-SCNC: 0.4 MMOL/L (ref 0.6–1.2)
LYMPHOCYTES # BLD AUTO: 2 10E3/UL (ref 0.8–5.3)
LYMPHOCYTES NFR BLD AUTO: 29 %
MCH RBC QN AUTO: 31.3 PG (ref 26.5–33)
MCHC RBC AUTO-ENTMCNC: 34.1 G/DL (ref 31.5–36.5)
MCV RBC AUTO: 92 FL (ref 78–100)
MONOCYTES # BLD AUTO: 0.5 10E3/UL (ref 0–1.3)
MONOCYTES NFR BLD AUTO: 8 %
MONOCYTES NFR BLD AUTO: NEGATIVE %
NEUTROPHILS # BLD AUTO: 3.7 10E3/UL (ref 1.6–8.3)
NEUTROPHILS NFR BLD AUTO: 56 %
NITRATE UR QL: NEGATIVE
NRBC # BLD AUTO: 0 10E3/UL
NRBC BLD AUTO-RTO: 0 /100
NT-PROBNP SERPL-MCNC: <36 PG/ML (ref 0–229)
PH UR STRIP: 6 [PH] (ref 5–9)
PLATELET # BLD AUTO: 284 10E3/UL (ref 150–450)
POTASSIUM SERPL-SCNC: 4.2 MMOL/L (ref 3.4–5.3)
PROT SERPL-MCNC: 7.7 G/DL (ref 6.4–8.3)
RBC # BLD AUTO: 4.86 10E6/UL (ref 4.4–5.9)
SODIUM SERPL-SCNC: 140 MMOL/L (ref 135–145)
SP GR UR STRIP: 1.01 (ref 1–1.03)
T4 FREE SERPL-MCNC: 0.86 NG/DL (ref 0.9–1.7)
TSH SERPL DL<=0.005 MIU/L-ACNC: 2.34 UIU/ML (ref 0.3–4.2)
UROBILINOGEN UR STRIP-MCNC: NORMAL MG/DL
VIT B12 SERPL-MCNC: 490 PG/ML (ref 232–1245)
WBC # BLD AUTO: 6.7 10E3/UL (ref 4–11)

## 2025-07-16 PROCEDURE — 36415 COLL VENOUS BLD VENIPUNCTURE: CPT | Mod: ZL | Performed by: STUDENT IN AN ORGANIZED HEALTH CARE EDUCATION/TRAINING PROGRAM

## 2025-07-16 PROCEDURE — 84443 ASSAY THYROID STIM HORMONE: CPT | Mod: ZL | Performed by: STUDENT IN AN ORGANIZED HEALTH CARE EDUCATION/TRAINING PROGRAM

## 2025-07-16 PROCEDURE — 80178 ASSAY OF LITHIUM: CPT | Mod: ZL | Performed by: STUDENT IN AN ORGANIZED HEALTH CARE EDUCATION/TRAINING PROGRAM

## 2025-07-16 PROCEDURE — 80053 COMPREHEN METABOLIC PANEL: CPT | Mod: ZL | Performed by: STUDENT IN AN ORGANIZED HEALTH CARE EDUCATION/TRAINING PROGRAM

## 2025-07-16 PROCEDURE — 82607 VITAMIN B-12: CPT | Mod: ZL | Performed by: STUDENT IN AN ORGANIZED HEALTH CARE EDUCATION/TRAINING PROGRAM

## 2025-07-16 PROCEDURE — 81003 URINALYSIS AUTO W/O SCOPE: CPT | Mod: ZL | Performed by: STUDENT IN AN ORGANIZED HEALTH CARE EDUCATION/TRAINING PROGRAM

## 2025-07-16 PROCEDURE — 83550 IRON BINDING TEST: CPT | Mod: ZL | Performed by: STUDENT IN AN ORGANIZED HEALTH CARE EDUCATION/TRAINING PROGRAM

## 2025-07-16 PROCEDURE — 83880 ASSAY OF NATRIURETIC PEPTIDE: CPT | Mod: ZL | Performed by: STUDENT IN AN ORGANIZED HEALTH CARE EDUCATION/TRAINING PROGRAM

## 2025-07-16 PROCEDURE — 85004 AUTOMATED DIFF WBC COUNT: CPT | Mod: ZL | Performed by: STUDENT IN AN ORGANIZED HEALTH CARE EDUCATION/TRAINING PROGRAM

## 2025-07-16 PROCEDURE — 84439 ASSAY OF FREE THYROXINE: CPT | Mod: ZL | Performed by: STUDENT IN AN ORGANIZED HEALTH CARE EDUCATION/TRAINING PROGRAM

## 2025-07-16 PROCEDURE — 86308 HETEROPHILE ANTIBODY SCREEN: CPT | Mod: ZL | Performed by: STUDENT IN AN ORGANIZED HEALTH CARE EDUCATION/TRAINING PROGRAM

## 2025-07-16 PROCEDURE — 82306 VITAMIN D 25 HYDROXY: CPT | Mod: ZL | Performed by: STUDENT IN AN ORGANIZED HEALTH CARE EDUCATION/TRAINING PROGRAM

## 2025-07-16 RX ORDER — LORATADINE 10 MG/1
10 TABLET ORAL DAILY
COMMUNITY
Start: 2025-07-16

## 2025-07-16 ASSESSMENT — ANXIETY QUESTIONNAIRES
7. FEELING AFRAID AS IF SOMETHING AWFUL MIGHT HAPPEN: NOT AT ALL
6. BECOMING EASILY ANNOYED OR IRRITABLE: NOT AT ALL
2. NOT BEING ABLE TO STOP OR CONTROL WORRYING: NOT AT ALL
GAD7 TOTAL SCORE: 0
IF YOU CHECKED OFF ANY PROBLEMS ON THIS QUESTIONNAIRE, HOW DIFFICULT HAVE THESE PROBLEMS MADE IT FOR YOU TO DO YOUR WORK, TAKE CARE OF THINGS AT HOME, OR GET ALONG WITH OTHER PEOPLE: NOT DIFFICULT AT ALL
5. BEING SO RESTLESS THAT IT IS HARD TO SIT STILL: NOT AT ALL
7. FEELING AFRAID AS IF SOMETHING AWFUL MIGHT HAPPEN: NOT AT ALL
3. WORRYING TOO MUCH ABOUT DIFFERENT THINGS: NOT AT ALL
GAD7 TOTAL SCORE: 0
4. TROUBLE RELAXING: NOT AT ALL
8. IF YOU CHECKED OFF ANY PROBLEMS, HOW DIFFICULT HAVE THESE MADE IT FOR YOU TO DO YOUR WORK, TAKE CARE OF THINGS AT HOME, OR GET ALONG WITH OTHER PEOPLE?: NOT DIFFICULT AT ALL
GAD7 TOTAL SCORE: 0
1. FEELING NERVOUS, ANXIOUS, OR ON EDGE: NOT AT ALL

## 2025-07-16 ASSESSMENT — PATIENT HEALTH QUESTIONNAIRE - PHQ9
SUM OF ALL RESPONSES TO PHQ QUESTIONS 1-9: 5
SUM OF ALL RESPONSES TO PHQ QUESTIONS 1-9: 5
10. IF YOU CHECKED OFF ANY PROBLEMS, HOW DIFFICULT HAVE THESE PROBLEMS MADE IT FOR YOU TO DO YOUR WORK, TAKE CARE OF THINGS AT HOME, OR GET ALONG WITH OTHER PEOPLE: NOT DIFFICULT AT ALL

## 2025-07-16 ASSESSMENT — ENCOUNTER SYMPTOMS: FATIGUE: 1

## 2025-07-16 ASSESSMENT — PAIN SCALES - GENERAL: PAINLEVEL_OUTOF10: NO PAIN (0)

## 2025-07-16 NOTE — NURSING NOTE
"Chief Complaint   Patient presents with    Fatigue     Episodes of tiredness x 3 weeks, LOC x 5       Initial /86 (BP Location: Right arm, Patient Position: Sitting, Cuff Size: Adult Large)   Pulse 72   Temp 98.2  F (36.8  C) (Tympanic)   Resp 16   Ht 1.727 m (5' 8\")   Wt 107 kg (236 lb)   SpO2 95%   BMI 35.88 kg/m   Estimated body mass index is 35.88 kg/m  as calculated from the following:    Height as of this encounter: 1.727 m (5' 8\").    Weight as of this encounter: 107 kg (236 lb).  Medication Review: complete    The next two questions are to help us understand your food security.  If you are feeling you need any assistance in this area, we have resources available to support you today.          10/3/2024   SDOH- Food Insecurity   Within the past 12 months, did you worry that your food would run out before you got money to buy more? N   Within the past 12 months, did the food you bought just not last and you didn t have money to get more? N        Data saved with a previous flowsheet row definition         Health Care Directive:  Patient does not have a Health Care Directive: Discussed advance care planning with patient; however, patient declined at this time.    Cynthia Skinner LPN      "

## 2025-07-16 NOTE — PROGRESS NOTES
"  {PROVIDER CHARTING PREFERENCE:903422}    Subjective   Isaiah is a 74 year old, presenting for the following health issues:  Fatigue (Episodes of tiredness x 3 weeks, LOC x 5)  {(!) Visit Details have not yet been documented.  Please enter Visit Details and then use this list to pull in documentation. (Optional):441130}  Fatigue  Associated symptoms include fatigue.   History of Present Illness       Reason for visit:  Tired most of the time  Symptom onset:  3-4 weeks ago  Symptoms include:  Tired  Symptom intensity:  Severe  Symptom progression:  Worsening  Had these symptoms before:  No  What makes it worse:  Not enough sleep  What makes it better:  Sleeping He is missing 1 dose(s) of medications per week.  He is not taking prescribed medications regularly due to remembering to take.          Monospot--if in acute phase of illness  IgM--> goes away after 3 months. Test if symptoms >2 weeks  IgG--> have antibodies for life.     Splenic rupture risk highest in the first 2-21 days  Contact sports: wait 4 weeks   Non contact less strenuous activity ok resume at 3 weeks       Tired all the time, sleeps too much  Last   Bed at 7    {MA/LPN/RN Pre-Provider Visit Orders- hCG/UA/Strep (Optional):118013}  {SUPERLIST (Optional):019289}  {additonal problems for provider to add (Optional):881387}    {ROS Picklists (Optional):234141}      Objective    /86 (BP Location: Right arm, Patient Position: Sitting, Cuff Size: Adult Large)   Pulse 72   Temp 98.2  F (36.8  C) (Tympanic)   Resp 16   Ht 1.727 m (5' 8\")   Wt 107 kg (236 lb)   SpO2 95%   BMI 35.88 kg/m    Body mass index is 35.88 kg/m .  Physical Exam   {Exam List (Optional):792289}    {Diagnostic Test Results (Optional):200736}        Signed Electronically by: Dayana Noriega MD  {Email feedback regarding this note to [email or web address]   :745676}  "

## 2025-07-17 LAB — VIT D+METAB SERPL-MCNC: 46 NG/ML (ref 20–50)

## 2025-07-17 NOTE — TELEPHONE ENCOUNTER
It's possible that's why it is slightly low. I was checking to ensure it wasn't too high at toxic levels as that could cause drowsiness

## 2025-07-18 PROBLEM — E66.01 MORBID OBESITY DUE TO EXCESS CALORIES (H): Status: RESOLVED | Noted: 2017-09-11 | Resolved: 2025-07-18

## 2025-07-30 ENCOUNTER — DOCUMENTATION ONLY (OUTPATIENT)
Dept: FAMILY MEDICINE | Facility: OTHER | Age: 75
End: 2025-07-30
Payer: COMMERCIAL

## 2025-07-30 DIAGNOSIS — G47.33 OSA (OBSTRUCTIVE SLEEP APNEA): Primary | ICD-10-CM

## (undated) DEVICE — SOL WATER IRRIG 1000ML BOTTLE 2F7114

## (undated) DEVICE — SUCTION MANIFOLD NEPTUNE 2 SYS 1 PORT 702-025-000

## (undated) DEVICE — CONNECTOR ERBEFLO 2 PORT 20325-215

## (undated) DEVICE — CANISTER SUCTION MEDI-VAC GUARDIAN 2000ML 90D 65651-220

## (undated) DEVICE — TUBING SUCTION 20FT N620A

## (undated) RX ORDER — PROPOFOL 10 MG/ML
INJECTION, EMULSION INTRAVENOUS
Status: DISPENSED
Start: 2025-04-14

## (undated) RX ORDER — DEXAMETHASONE SODIUM PHOSPHATE 10 MG/ML
INJECTION, SOLUTION INTRAMUSCULAR; INTRAVENOUS
Status: DISPENSED
Start: 2025-02-11